# Patient Record
Sex: FEMALE | Race: WHITE | NOT HISPANIC OR LATINO | Employment: UNEMPLOYED | URBAN - METROPOLITAN AREA
[De-identification: names, ages, dates, MRNs, and addresses within clinical notes are randomized per-mention and may not be internally consistent; named-entity substitution may affect disease eponyms.]

---

## 2018-01-01 ENCOUNTER — APPOINTMENT (OUTPATIENT)
Dept: RADIOLOGY | Facility: MEDICAL CENTER | Age: 48
DRG: 870 | End: 2018-01-01
Attending: SURGERY
Payer: MEDICAID

## 2018-01-01 ENCOUNTER — APPOINTMENT (OUTPATIENT)
Dept: RADIOLOGY | Facility: MEDICAL CENTER | Age: 48
DRG: 870 | End: 2018-01-01
Attending: EMERGENCY MEDICINE
Payer: MEDICAID

## 2018-01-01 ENCOUNTER — APPOINTMENT (OUTPATIENT)
Dept: RADIOLOGY | Facility: MEDICAL CENTER | Age: 48
DRG: 870 | End: 2018-01-01
Attending: INTERNAL MEDICINE
Payer: MEDICAID

## 2018-01-01 ENCOUNTER — APPOINTMENT (OUTPATIENT)
Dept: RADIOLOGY | Facility: MEDICAL CENTER | Age: 48
DRG: 870 | End: 2018-01-01
Attending: HOSPITALIST
Payer: MEDICAID

## 2018-01-01 ENCOUNTER — RESOLUTE PROFESSIONAL BILLING HOSPITAL PROF FEE (OUTPATIENT)
Dept: HOSPITALIST | Facility: MEDICAL CENTER | Age: 48
End: 2018-01-01
Payer: MEDICAID

## 2018-01-01 ENCOUNTER — HOSPITAL ENCOUNTER (INPATIENT)
Facility: MEDICAL CENTER | Age: 48
LOS: 6 days | DRG: 870 | End: 2018-02-25
Attending: EMERGENCY MEDICINE | Admitting: HOSPITALIST
Payer: MEDICAID

## 2018-01-01 VITALS
TEMPERATURE: 99 F | DIASTOLIC BLOOD PRESSURE: 90 MMHG | WEIGHT: 293 LBS | HEART RATE: 77 BPM | OXYGEN SATURATION: 96 % | SYSTOLIC BLOOD PRESSURE: 128 MMHG | HEIGHT: 65 IN | BODY MASS INDEX: 48.82 KG/M2

## 2018-01-01 DIAGNOSIS — H10.33 ACUTE BACTERIAL CONJUNCTIVITIS OF BOTH EYES: ICD-10-CM

## 2018-01-01 DIAGNOSIS — I50.9 ACUTE CONGESTIVE HEART FAILURE, UNSPECIFIED CONGESTIVE HEART FAILURE TYPE: ICD-10-CM

## 2018-01-01 DIAGNOSIS — J96.00 ACUTE RESPIRATORY FAILURE, UNSPECIFIED WHETHER WITH HYPOXIA OR HYPERCAPNIA (HCC): Primary | ICD-10-CM

## 2018-01-01 DIAGNOSIS — I48.91 ATRIAL FIBRILLATION WITH RAPID VENTRICULAR RESPONSE (HCC): ICD-10-CM

## 2018-01-01 LAB
ACTION RANGE TRIGGERED IACRT: NO
ACTION RANGE TRIGGERED IACRT: YES
ALBUMIN SERPL BCP-MCNC: 2.7 G/DL (ref 3.2–4.9)
ALBUMIN SERPL BCP-MCNC: 2.8 G/DL (ref 3.2–4.9)
ALBUMIN SERPL BCP-MCNC: 3.8 G/DL (ref 3.2–4.9)
ALBUMIN/GLOB SERPL: 0.9 G/DL
ALBUMIN/GLOB SERPL: 1.2 G/DL
ALP SERPL-CCNC: 109 U/L (ref 30–99)
ALP SERPL-CCNC: 194 U/L (ref 30–99)
ALP SERPL-CCNC: 222 U/L (ref 30–99)
ALT SERPL-CCNC: 1671 U/L (ref 2–50)
ALT SERPL-CCNC: 2837 U/L (ref 2–50)
ALT SERPL-CCNC: 404 U/L (ref 2–50)
AMMONIA PLAS-SCNC: 244 UMOL/L (ref 11–45)
AMMONIA PLAS-SCNC: 377 UMOL/L (ref 11–45)
ANION GAP SERPL CALC-SCNC: 10 MMOL/L (ref 0–11.9)
ANION GAP SERPL CALC-SCNC: 13 MMOL/L (ref 0–11.9)
ANION GAP SERPL CALC-SCNC: 14 MMOL/L (ref 0–11.9)
ANION GAP SERPL CALC-SCNC: 15 MMOL/L (ref 0–11.9)
ANION GAP SERPL CALC-SCNC: 15 MMOL/L (ref 0–11.9)
ANION GAP SERPL CALC-SCNC: 21 MMOL/L (ref 0–11.9)
ANION GAP SERPL CALC-SCNC: 4 MMOL/L (ref 0–11.9)
ANION GAP SERPL CALC-SCNC: 7 MMOL/L (ref 0–11.9)
ANISOCYTOSIS BLD QL SMEAR: ABNORMAL
APTT PPP: 102.4 SEC (ref 24.7–36)
APTT PPP: 109.1 SEC (ref 24.7–36)
APTT PPP: 111.4 SEC (ref 24.7–36)
APTT PPP: 122.4 SEC (ref 24.7–36)
APTT PPP: 142.6 SEC (ref 24.7–36)
APTT PPP: 25.2 SEC (ref 24.7–36)
APTT PPP: 34.8 SEC (ref 24.7–36)
APTT PPP: 50.7 SEC (ref 24.7–36)
APTT PPP: 52.7 SEC (ref 24.7–36)
APTT PPP: 73.7 SEC (ref 24.7–36)
APTT PPP: 90 SEC (ref 24.7–36)
APTT PPP: 90.9 SEC (ref 24.7–36)
APTT PPP: 91 SEC (ref 24.7–36)
APTT PPP: 94.3 SEC (ref 24.7–36)
AST SERPL-CCNC: 1013 U/L (ref 12–45)
AST SERPL-CCNC: 2520 U/L (ref 12–45)
AST SERPL-CCNC: 290 U/L (ref 12–45)
BACTERIA BLD CULT: ABNORMAL
BACTERIA BLD CULT: ABNORMAL
BACTERIA SPEC RESP CULT: NORMAL
BASE EXCESS BLDA CALC-SCNC: -1 MMOL/L (ref -4–3)
BASE EXCESS BLDA CALC-SCNC: -10 MMOL/L (ref -4–3)
BASE EXCESS BLDA CALC-SCNC: -12 MMOL/L (ref -4–3)
BASE EXCESS BLDA CALC-SCNC: -13 MMOL/L (ref -4–3)
BASE EXCESS BLDA CALC-SCNC: -15 MMOL/L (ref -4–3)
BASE EXCESS BLDA CALC-SCNC: -15 MMOL/L (ref -4–3)
BASE EXCESS BLDA CALC-SCNC: -2 MMOL/L (ref -4–3)
BASE EXCESS BLDA CALC-SCNC: -3 MMOL/L (ref -4–3)
BASE EXCESS BLDA CALC-SCNC: -4 MMOL/L (ref -4–3)
BASE EXCESS BLDA CALC-SCNC: -4 MMOL/L (ref -4–3)
BASE EXCESS BLDA CALC-SCNC: 1 MMOL/L (ref -4–3)
BASOPHILS # BLD AUTO: 0 % (ref 0–1.8)
BASOPHILS # BLD AUTO: 0 % (ref 0–1.8)
BASOPHILS # BLD AUTO: 0.1 % (ref 0–1.8)
BASOPHILS # BLD AUTO: 0.1 % (ref 0–1.8)
BASOPHILS # BLD AUTO: 0.2 % (ref 0–1.8)
BASOPHILS # BLD AUTO: 0.2 % (ref 0–1.8)
BASOPHILS # BLD AUTO: 0.4 % (ref 0–1.8)
BASOPHILS # BLD: 0 K/UL (ref 0–0.12)
BASOPHILS # BLD: 0 K/UL (ref 0–0.12)
BASOPHILS # BLD: 0.01 K/UL (ref 0–0.12)
BASOPHILS # BLD: 0.03 K/UL (ref 0–0.12)
BASOPHILS # BLD: 0.04 K/UL (ref 0–0.12)
BASOPHILS # BLD: 0.04 K/UL (ref 0–0.12)
BASOPHILS # BLD: 0.08 K/UL (ref 0–0.12)
BILIRUB CONJ SERPL-MCNC: 1.8 MG/DL (ref 0.1–0.5)
BILIRUB INDIRECT SERPL-MCNC: 1.4 MG/DL (ref 0–1)
BILIRUB SERPL-MCNC: 1.8 MG/DL (ref 0.1–1.5)
BILIRUB SERPL-MCNC: 2.5 MG/DL (ref 0.1–1.5)
BILIRUB SERPL-MCNC: 3.2 MG/DL (ref 0.1–1.5)
BNP SERPL-MCNC: 176 PG/ML (ref 0–100)
BNP SERPL-MCNC: 370 PG/ML (ref 0–100)
BODY TEMPERATURE: ABNORMAL DEGREES
BODY TEMPERATURE: NORMAL DEGREES
BODY TEMPERATURE: NORMAL DEGREES
BUN SERPL-MCNC: 20 MG/DL (ref 8–22)
BUN SERPL-MCNC: 22 MG/DL (ref 8–22)
BUN SERPL-MCNC: 27 MG/DL (ref 8–22)
BUN SERPL-MCNC: 36 MG/DL (ref 8–22)
BUN SERPL-MCNC: 40 MG/DL (ref 8–22)
BUN SERPL-MCNC: 52 MG/DL (ref 8–22)
BUN SERPL-MCNC: 57 MG/DL (ref 8–22)
BUN SERPL-MCNC: 63 MG/DL (ref 8–22)
CALCIUM SERPL-MCNC: 6.2 MG/DL (ref 8.5–10.5)
CALCIUM SERPL-MCNC: 6.6 MG/DL (ref 8.5–10.5)
CALCIUM SERPL-MCNC: 6.6 MG/DL (ref 8.5–10.5)
CALCIUM SERPL-MCNC: 6.7 MG/DL (ref 8.5–10.5)
CALCIUM SERPL-MCNC: 7.7 MG/DL (ref 8.5–10.5)
CALCIUM SERPL-MCNC: 8.7 MG/DL (ref 8.5–10.5)
CALCIUM SERPL-MCNC: 8.7 MG/DL (ref 8.5–10.5)
CALCIUM SERPL-MCNC: 9 MG/DL (ref 8.5–10.5)
CHLORIDE SERPL-SCNC: 100 MMOL/L (ref 96–112)
CHLORIDE SERPL-SCNC: 100 MMOL/L (ref 96–112)
CHLORIDE SERPL-SCNC: 102 MMOL/L (ref 96–112)
CHLORIDE SERPL-SCNC: 106 MMOL/L (ref 96–112)
CHLORIDE SERPL-SCNC: 95 MMOL/L (ref 96–112)
CHLORIDE SERPL-SCNC: 96 MMOL/L (ref 96–112)
CO2 BLDA-SCNC: 13 MMOL/L (ref 20–33)
CO2 BLDA-SCNC: 15 MMOL/L (ref 20–33)
CO2 BLDA-SCNC: 16 MMOL/L (ref 20–33)
CO2 BLDA-SCNC: 17 MMOL/L (ref 20–33)
CO2 BLDA-SCNC: 21 MMOL/L (ref 20–33)
CO2 BLDA-SCNC: 22 MMOL/L (ref 20–33)
CO2 BLDA-SCNC: 23 MMOL/L (ref 20–33)
CO2 BLDA-SCNC: 23 MMOL/L (ref 20–33)
CO2 BLDA-SCNC: 24 MMOL/L (ref 20–33)
CO2 BLDA-SCNC: 27 MMOL/L (ref 20–33)
CO2 BLDA-SCNC: 30 MMOL/L (ref 20–33)
CO2 SERPL-SCNC: 14 MMOL/L (ref 20–33)
CO2 SERPL-SCNC: 17 MMOL/L (ref 20–33)
CO2 SERPL-SCNC: 21 MMOL/L (ref 20–33)
CO2 SERPL-SCNC: 22 MMOL/L (ref 20–33)
CO2 SERPL-SCNC: 27 MMOL/L (ref 20–33)
CO2 SERPL-SCNC: 29 MMOL/L (ref 20–33)
COMMENT 1642: NORMAL
COMMENT 1642: NORMAL
CORTIS SERPL-MCNC: 25.2 UG/DL (ref 0–23)
CREAT SERPL-MCNC: 1.05 MG/DL (ref 0.5–1.4)
CREAT SERPL-MCNC: 1.3 MG/DL (ref 0.5–1.4)
CREAT SERPL-MCNC: 1.86 MG/DL (ref 0.5–1.4)
CREAT SERPL-MCNC: 2.64 MG/DL (ref 0.5–1.4)
CREAT SERPL-MCNC: 3.35 MG/DL (ref 0.5–1.4)
CREAT SERPL-MCNC: 4.43 MG/DL (ref 0.5–1.4)
CREAT SERPL-MCNC: 5.39 MG/DL (ref 0.5–1.4)
CREAT SERPL-MCNC: 5.46 MG/DL (ref 0.5–1.4)
EKG IMPRESSION: NORMAL
EKG IMPRESSION: NORMAL
EOSINOPHIL # BLD AUTO: 0 K/UL (ref 0–0.51)
EOSINOPHIL # BLD AUTO: 0.01 K/UL (ref 0–0.51)
EOSINOPHIL # BLD AUTO: 0.03 K/UL (ref 0–0.51)
EOSINOPHIL NFR BLD: 0 % (ref 0–6.9)
EOSINOPHIL NFR BLD: 0.1 % (ref 0–6.9)
EOSINOPHIL NFR BLD: 0.1 % (ref 0–6.9)
ERYTHROCYTE [DISTWIDTH] IN BLOOD BY AUTOMATED COUNT: 50.4 FL (ref 35.9–50)
ERYTHROCYTE [DISTWIDTH] IN BLOOD BY AUTOMATED COUNT: 50.4 FL (ref 35.9–50)
ERYTHROCYTE [DISTWIDTH] IN BLOOD BY AUTOMATED COUNT: 52 FL (ref 35.9–50)
ERYTHROCYTE [DISTWIDTH] IN BLOOD BY AUTOMATED COUNT: 52.2 FL (ref 35.9–50)
ERYTHROCYTE [DISTWIDTH] IN BLOOD BY AUTOMATED COUNT: 52.8 FL (ref 35.9–50)
ERYTHROCYTE [DISTWIDTH] IN BLOOD BY AUTOMATED COUNT: 54.4 FL (ref 35.9–50)
ERYTHROCYTE [DISTWIDTH] IN BLOOD BY AUTOMATED COUNT: 54.5 FL (ref 35.9–50)
GLOBULIN SER CALC-MCNC: 2.9 G/DL (ref 1.9–3.5)
GLOBULIN SER CALC-MCNC: 3.3 G/DL (ref 1.9–3.5)
GLUCOSE BLD-MCNC: 102 MG/DL (ref 65–99)
GLUCOSE BLD-MCNC: 108 MG/DL (ref 65–99)
GLUCOSE BLD-MCNC: 109 MG/DL (ref 65–99)
GLUCOSE BLD-MCNC: 113 MG/DL (ref 65–99)
GLUCOSE BLD-MCNC: 113 MG/DL (ref 65–99)
GLUCOSE BLD-MCNC: 114 MG/DL (ref 65–99)
GLUCOSE BLD-MCNC: 117 MG/DL (ref 65–99)
GLUCOSE BLD-MCNC: 120 MG/DL (ref 65–99)
GLUCOSE BLD-MCNC: 121 MG/DL (ref 65–99)
GLUCOSE BLD-MCNC: 125 MG/DL (ref 65–99)
GLUCOSE BLD-MCNC: 129 MG/DL (ref 65–99)
GLUCOSE BLD-MCNC: 132 MG/DL (ref 65–99)
GLUCOSE BLD-MCNC: 135 MG/DL (ref 65–99)
GLUCOSE BLD-MCNC: 145 MG/DL (ref 65–99)
GLUCOSE BLD-MCNC: 151 MG/DL (ref 65–99)
GLUCOSE BLD-MCNC: 157 MG/DL (ref 65–99)
GLUCOSE BLD-MCNC: 61 MG/DL (ref 65–99)
GLUCOSE BLD-MCNC: 63 MG/DL (ref 65–99)
GLUCOSE BLD-MCNC: 71 MG/DL (ref 65–99)
GLUCOSE BLD-MCNC: 76 MG/DL (ref 65–99)
GLUCOSE BLD-MCNC: 79 MG/DL (ref 65–99)
GLUCOSE BLD-MCNC: 82 MG/DL (ref 65–99)
GLUCOSE BLD-MCNC: 86 MG/DL (ref 65–99)
GLUCOSE BLD-MCNC: 89 MG/DL (ref 65–99)
GLUCOSE BLD-MCNC: 91 MG/DL (ref 65–99)
GLUCOSE BLD-MCNC: 92 MG/DL (ref 65–99)
GLUCOSE BLD-MCNC: 93 MG/DL (ref 65–99)
GLUCOSE BLD-MCNC: 95 MG/DL (ref 65–99)
GLUCOSE BLD-MCNC: 98 MG/DL (ref 65–99)
GLUCOSE SERPL-MCNC: 106 MG/DL (ref 65–99)
GLUCOSE SERPL-MCNC: 113 MG/DL (ref 65–99)
GLUCOSE SERPL-MCNC: 115 MG/DL (ref 65–99)
GLUCOSE SERPL-MCNC: 124 MG/DL (ref 65–99)
GLUCOSE SERPL-MCNC: 126 MG/DL (ref 65–99)
GLUCOSE SERPL-MCNC: 150 MG/DL (ref 65–99)
GLUCOSE SERPL-MCNC: 178 MG/DL (ref 65–99)
GLUCOSE SERPL-MCNC: 99 MG/DL (ref 65–99)
GRAM STN SPEC: NORMAL
GRAM STN SPEC: NORMAL
HAV IGM SERPL QL IA: NEGATIVE
HBV CORE IGM SER QL: NEGATIVE
HBV SURFACE AB SERPL IA-ACNC: <3.1 MIU/ML (ref 0–10)
HBV SURFACE AG SER QL: NEGATIVE
HCO3 BLDA-SCNC: 11.9 MMOL/L (ref 17–25)
HCO3 BLDA-SCNC: 13.9 MMOL/L (ref 17–25)
HCO3 BLDA-SCNC: 14.4 MMOL/L (ref 17–25)
HCO3 BLDA-SCNC: 16.1 MMOL/L (ref 17–25)
HCO3 BLDA-SCNC: 19.8 MMOL/L (ref 17–25)
HCO3 BLDA-SCNC: 19.9 MMOL/L (ref 17–25)
HCO3 BLDA-SCNC: 21.7 MMOL/L (ref 17–25)
HCO3 BLDA-SCNC: 21.7 MMOL/L (ref 17–25)
HCO3 BLDA-SCNC: 22.4 MMOL/L (ref 17–25)
HCO3 BLDA-SCNC: 22.6 MMOL/L (ref 17–25)
HCO3 BLDA-SCNC: 22.7 MMOL/L (ref 17–25)
HCO3 BLDA-SCNC: 24.8 MMOL/L (ref 17–25)
HCO3 BLDA-SCNC: 28.2 MMOL/L (ref 17–25)
HCT VFR BLD AUTO: 38.8 % (ref 37–47)
HCT VFR BLD AUTO: 40 % (ref 37–47)
HCT VFR BLD AUTO: 40.5 % (ref 37–47)
HCT VFR BLD AUTO: 40.9 % (ref 37–47)
HCT VFR BLD AUTO: 42.4 % (ref 37–47)
HCT VFR BLD AUTO: 45.1 % (ref 37–47)
HCT VFR BLD AUTO: 48.2 % (ref 37–47)
HCV AB SER QL: NEGATIVE
HEPIND PLTAB  51052: NEGATIVE
HGB BLD-MCNC: 11.5 G/DL (ref 12–16)
HGB BLD-MCNC: 12.1 G/DL (ref 12–16)
HGB BLD-MCNC: 12.1 G/DL (ref 12–16)
HGB BLD-MCNC: 12.3 G/DL (ref 12–16)
HGB BLD-MCNC: 12.6 G/DL (ref 12–16)
HGB BLD-MCNC: 13.1 G/DL (ref 12–16)
HGB BLD-MCNC: 13.5 G/DL (ref 12–16)
HYPOCHROMIA BLD QL SMEAR: ABNORMAL
IMM GRANULOCYTES # BLD AUTO: 0.03 K/UL (ref 0–0.11)
IMM GRANULOCYTES # BLD AUTO: 0.07 K/UL (ref 0–0.11)
IMM GRANULOCYTES # BLD AUTO: 0.21 K/UL (ref 0–0.11)
IMM GRANULOCYTES # BLD AUTO: 0.24 K/UL (ref 0–0.11)
IMM GRANULOCYTES # BLD AUTO: 0.28 K/UL (ref 0–0.11)
IMM GRANULOCYTES NFR BLD AUTO: 0.4 % (ref 0–0.9)
IMM GRANULOCYTES NFR BLD AUTO: 0.5 % (ref 0–0.9)
IMM GRANULOCYTES NFR BLD AUTO: 0.9 % (ref 0–0.9)
IMM GRANULOCYTES NFR BLD AUTO: 1.1 % (ref 0–0.9)
IMM GRANULOCYTES NFR BLD AUTO: 1.3 % (ref 0–0.9)
INR PPP: 1.44 (ref 0.87–1.13)
INR PPP: 1.64 (ref 0.87–1.13)
INR PPP: 2.56 (ref 0.87–1.13)
INR PPP: 3.07 (ref 0.87–1.13)
INR PPP: 3.18 (ref 0.87–1.13)
INR PPP: 3.18 (ref 0.87–1.13)
INR PPP: 3.48 (ref 0.87–1.13)
INST. QUALIFIED PATIENT IIQPT: YES
LACTATE BLD-SCNC: 2.4 MMOL/L (ref 0.5–2)
LACTATE BLD-SCNC: 2.7 MMOL/L (ref 0.5–2)
LACTATE BLD-SCNC: 4.1 MMOL/L (ref 0.5–2)
LACTATE BLD-SCNC: 5.2 MMOL/L (ref 0.5–2)
LACTATE BLD-SCNC: 5.2 MMOL/L (ref 0.5–2)
LACTATE BLD-SCNC: 5.3 MMOL/L (ref 0.5–2)
LACTATE BLD-SCNC: 5.4 MMOL/L (ref 0.5–2)
LACTATE BLD-SCNC: 5.6 MMOL/L (ref 0.5–2)
LACTATE BLD-SCNC: 5.6 MMOL/L (ref 0.5–2)
LACTATE BLD-SCNC: 5.9 MMOL/L (ref 0.5–2)
LACTATE BLD-SCNC: 6.1 MMOL/L (ref 0.5–2)
LACTATE BLD-SCNC: 6.2 MMOL/L (ref 0.5–2)
LACTATE BLD-SCNC: 6.6 MMOL/L (ref 0.5–2)
LACTATE BLD-SCNC: 7.3 MMOL/L (ref 0.5–2)
LACTATE BLD-SCNC: 8.3 MMOL/L (ref 0.5–2)
LACTATE BLD-SCNC: 9.1 MMOL/L (ref 0.5–2)
LACTATE BLD-SCNC: 9.5 MMOL/L (ref 0.5–2)
LACTATE BLD-SCNC: 9.9 MMOL/L (ref 0.5–2)
LV EJECT FRACT  99904: 20
LYMPHOCYTES # BLD AUTO: 0.45 K/UL (ref 1–4.8)
LYMPHOCYTES # BLD AUTO: 0.68 K/UL (ref 1–4.8)
LYMPHOCYTES # BLD AUTO: 1.36 K/UL (ref 1–4.8)
LYMPHOCYTES # BLD AUTO: 1.52 K/UL (ref 1–4.8)
LYMPHOCYTES # BLD AUTO: 1.63 K/UL (ref 1–4.8)
LYMPHOCYTES # BLD AUTO: 2.07 K/UL (ref 1–4.8)
LYMPHOCYTES # BLD AUTO: 2.7 K/UL (ref 1–4.8)
LYMPHOCYTES NFR BLD: 10.7 % (ref 22–41)
LYMPHOCYTES NFR BLD: 12.7 % (ref 22–41)
LYMPHOCYTES NFR BLD: 4.5 % (ref 22–41)
LYMPHOCYTES NFR BLD: 5.1 % (ref 22–41)
LYMPHOCYTES NFR BLD: 5.7 % (ref 22–41)
LYMPHOCYTES NFR BLD: 7.7 % (ref 22–41)
LYMPHOCYTES NFR BLD: 9.7 % (ref 22–41)
MACROCYTES BLD QL SMEAR: ABNORMAL
MAGNESIUM SERPL-MCNC: 1.9 MG/DL (ref 1.5–2.5)
MAGNESIUM SERPL-MCNC: 1.9 MG/DL (ref 1.5–2.5)
MAGNESIUM SERPL-MCNC: 2.1 MG/DL (ref 1.5–2.5)
MANUAL DIFF BLD: NORMAL
MANUAL DIFF BLD: NORMAL
MCH RBC QN AUTO: 27.3 PG (ref 27–33)
MCH RBC QN AUTO: 27.5 PG (ref 27–33)
MCH RBC QN AUTO: 27.5 PG (ref 27–33)
MCH RBC QN AUTO: 27.6 PG (ref 27–33)
MCH RBC QN AUTO: 27.8 PG (ref 27–33)
MCH RBC QN AUTO: 28 PG (ref 27–33)
MCH RBC QN AUTO: 28.2 PG (ref 27–33)
MCHC RBC AUTO-ENTMCNC: 28 G/DL (ref 33.6–35)
MCHC RBC AUTO-ENTMCNC: 29 G/DL (ref 33.6–35)
MCHC RBC AUTO-ENTMCNC: 29.6 G/DL (ref 33.6–35)
MCHC RBC AUTO-ENTMCNC: 29.7 G/DL (ref 33.6–35)
MCHC RBC AUTO-ENTMCNC: 29.9 G/DL (ref 33.6–35)
MCHC RBC AUTO-ENTMCNC: 30.1 G/DL (ref 33.6–35)
MCHC RBC AUTO-ENTMCNC: 30.3 G/DL (ref 33.6–35)
MCV RBC AUTO: 91.7 FL (ref 81.4–97.8)
MCV RBC AUTO: 91.9 FL (ref 81.4–97.8)
MCV RBC AUTO: 92 FL (ref 81.4–97.8)
MCV RBC AUTO: 93 FL (ref 81.4–97.8)
MCV RBC AUTO: 93 FL (ref 81.4–97.8)
MCV RBC AUTO: 97.2 FL (ref 81.4–97.8)
MCV RBC AUTO: 98.2 FL (ref 81.4–97.8)
MICROCYTES BLD QL SMEAR: ABNORMAL
MONOCYTES # BLD AUTO: 0.32 K/UL (ref 0–0.85)
MONOCYTES # BLD AUTO: 0.42 K/UL (ref 0–0.85)
MONOCYTES # BLD AUTO: 1.23 K/UL (ref 0–0.85)
MONOCYTES # BLD AUTO: 1.7 K/UL (ref 0–0.85)
MONOCYTES # BLD AUTO: 2 K/UL (ref 0–0.85)
MONOCYTES # BLD AUTO: 2.47 K/UL (ref 0–0.85)
MONOCYTES # BLD AUTO: 3.18 K/UL (ref 0–0.85)
MONOCYTES NFR BLD AUTO: 12.3 % (ref 0–13.4)
MONOCYTES NFR BLD AUTO: 15.1 % (ref 0–13.4)
MONOCYTES NFR BLD AUTO: 2.7 % (ref 0–13.4)
MONOCYTES NFR BLD AUTO: 4.1 % (ref 0–13.4)
MONOCYTES NFR BLD AUTO: 5.6 % (ref 0–13.4)
MONOCYTES NFR BLD AUTO: 9.3 % (ref 0–13.4)
MONOCYTES NFR BLD AUTO: 9.8 % (ref 0–13.4)
MORPHOLOGY BLD-IMP: NORMAL
NEUTROPHILS # BLD AUTO: 11.21 K/UL (ref 2–7.15)
NEUTROPHILS # BLD AUTO: 11.92 K/UL (ref 2–7.15)
NEUTROPHILS # BLD AUTO: 13.75 K/UL (ref 2–7.15)
NEUTROPHILS # BLD AUTO: 15.94 K/UL (ref 2–7.15)
NEUTROPHILS # BLD AUTO: 20.03 K/UL (ref 2–7.15)
NEUTROPHILS # BLD AUTO: 27.17 K/UL (ref 2–7.15)
NEUTROPHILS # BLD AUTO: 7.01 K/UL (ref 2–7.15)
NEUTROPHILS NFR BLD: 73.5 % (ref 44–72)
NEUTROPHILS NFR BLD: 75.6 % (ref 44–72)
NEUTROPHILS NFR BLD: 76.8 % (ref 44–72)
NEUTROPHILS NFR BLD: 84.9 % (ref 44–72)
NEUTROPHILS NFR BLD: 87.6 % (ref 44–72)
NEUTROPHILS NFR BLD: 88.9 % (ref 44–72)
NEUTROPHILS NFR BLD: 89.6 % (ref 44–72)
NEUTS BAND NFR BLD MANUAL: 2.7 % (ref 0–10)
NRBC # BLD AUTO: 0.06 K/UL
NRBC # BLD AUTO: 0.34 K/UL
NRBC # BLD AUTO: 0.87 K/UL
NRBC # BLD AUTO: 1.43 K/UL
NRBC # BLD AUTO: 1.94 K/UL
NRBC # BLD AUTO: 2.39 K/UL
NRBC # BLD AUTO: 3.1 K/UL
NRBC BLD-RTO: 0.8 /100 WBC
NRBC BLD-RTO: 14.7 /100 WBC
NRBC BLD-RTO: 19.7 /100 WBC
NRBC BLD-RTO: 2.6 /100 WBC
NRBC BLD-RTO: 4.1 /100 WBC
NRBC BLD-RTO: 4.7 /100 WBC
NRBC BLD-RTO: 7.7 /100 WBC
O2/TOTAL GAS SETTING VFR VENT: 100 %
O2/TOTAL GAS SETTING VFR VENT: 60 %
O2/TOTAL GAS SETTING VFR VENT: 60 %
O2/TOTAL GAS SETTING VFR VENT: 80 %
O2/TOTAL GAS SETTING VFR VENT: 80 %
O2/TOTAL GAS SETTING VFR VENT: 90 %
PCO2 BLDA: 31.1 MMHG (ref 26–37)
PCO2 BLDA: 31.7 MMHG (ref 26–37)
PCO2 BLDA: 32 MMHG (ref 26–37)
PCO2 BLDA: 34.5 MMHG (ref 26–37)
PCO2 BLDA: 34.8 MMHG (ref 26–37)
PCO2 BLDA: 36.1 MMHG (ref 26–37)
PCO2 BLDA: 41.5 MMHG (ref 26–37)
PCO2 BLDA: 41.7 MMHG (ref 26–37)
PCO2 BLDA: 43.9 MMHG (ref 26–37)
PCO2 BLDA: 47.6 MMHG (ref 26–37)
PCO2 BLDA: 52.1 MMHG (ref 26–37)
PCO2 BLDA: 56.5 MMHG (ref 26–37)
PCO2 BLDA: 57.7 MMHG (ref 26–37)
PCO2 TEMP ADJ BLDA: 30.3 MMHG (ref 26–37)
PCO2 TEMP ADJ BLDA: 31.1 MMHG (ref 26–37)
PCO2 TEMP ADJ BLDA: 31.4 MMHG (ref 26–37)
PCO2 TEMP ADJ BLDA: 32.7 MMHG (ref 26–37)
PCO2 TEMP ADJ BLDA: 34.4 MMHG (ref 26–37)
PCO2 TEMP ADJ BLDA: 35.4 MMHG (ref 26–37)
PCO2 TEMP ADJ BLDA: 39.3 MMHG (ref 26–37)
PCO2 TEMP ADJ BLDA: 41.2 MMHG (ref 26–37)
PCO2 TEMP ADJ BLDA: 43.4 MMHG (ref 26–37)
PCO2 TEMP ADJ BLDA: 45.3 MMHG (ref 26–37)
PCO2 TEMP ADJ BLDA: 52.1 MMHG (ref 26–37)
PCO2 TEMP ADJ BLDA: 53.3 MMHG (ref 26–37)
PCO2 TEMP ADJ BLDA: 54.2 MMHG (ref 26–37)
PH BLDA: 7.09 [PH] (ref 7.4–7.5)
PH BLDA: 7.14 [PH] (ref 7.4–7.5)
PH BLDA: 7.18 [PH] (ref 7.4–7.5)
PH BLDA: 7.19 [PH] (ref 7.4–7.5)
PH BLDA: 7.21 [PH] (ref 7.4–7.5)
PH BLDA: 7.25 [PH] (ref 7.4–7.5)
PH BLDA: 7.32 [PH] (ref 7.4–7.5)
PH BLDA: 7.34 [PH] (ref 7.4–7.5)
PH BLDA: 7.34 [PH] (ref 7.4–7.5)
PH BLDA: 7.39 [PH] (ref 7.4–7.5)
PH BLDA: 7.41 [PH] (ref 7.4–7.5)
PH BLDA: 7.42 [PH] (ref 7.4–7.5)
PH BLDA: 7.44 [PH] (ref 7.4–7.5)
PH TEMP ADJ BLDA: 7.1 [PH] (ref 7.4–7.5)
PH TEMP ADJ BLDA: 7.16 [PH] (ref 7.4–7.5)
PH TEMP ADJ BLDA: 7.2 [PH] (ref 7.4–7.5)
PH TEMP ADJ BLDA: 7.21 [PH] (ref 7.4–7.5)
PH TEMP ADJ BLDA: 7.23 [PH] (ref 7.4–7.5)
PH TEMP ADJ BLDA: 7.27 [PH] (ref 7.4–7.5)
PH TEMP ADJ BLDA: 7.32 [PH] (ref 7.4–7.5)
PH TEMP ADJ BLDA: 7.34 [PH] (ref 7.4–7.5)
PH TEMP ADJ BLDA: 7.34 [PH] (ref 7.4–7.5)
PH TEMP ADJ BLDA: 7.39 [PH] (ref 7.4–7.5)
PH TEMP ADJ BLDA: 7.41 [PH] (ref 7.4–7.5)
PH TEMP ADJ BLDA: 7.43 [PH] (ref 7.4–7.5)
PH TEMP ADJ BLDA: 7.45 [PH] (ref 7.4–7.5)
PHOSPHATE SERPL-MCNC: 6.3 MG/DL (ref 2.5–4.5)
PHOSPHATE SERPL-MCNC: 7.8 MG/DL (ref 2.5–4.5)
PHOSPHATE SERPL-MCNC: 8.2 MG/DL (ref 2.5–4.5)
PLATELET # BLD AUTO: 114 K/UL (ref 164–446)
PLATELET # BLD AUTO: 126 K/UL (ref 164–446)
PLATELET # BLD AUTO: 223 K/UL (ref 164–446)
PLATELET # BLD AUTO: 264 K/UL (ref 164–446)
PLATELET # BLD AUTO: 286 K/UL (ref 164–446)
PLATELET # BLD AUTO: 75 K/UL (ref 164–446)
PLATELET # BLD AUTO: 93 K/UL (ref 164–446)
PLATELET BLD QL SMEAR: NORMAL
PMV BLD AUTO: 10.5 FL (ref 9–12.9)
PMV BLD AUTO: 11.1 FL (ref 9–12.9)
PMV BLD AUTO: 11.7 FL (ref 9–12.9)
PMV BLD AUTO: 12.1 FL (ref 9–12.9)
PMV BLD AUTO: 12.4 FL (ref 9–12.9)
PMV BLD AUTO: 13.3 FL (ref 9–12.9)
PMV BLD AUTO: 9.8 FL (ref 9–12.9)
PO2 BLDA: 151 MMHG (ref 64–87)
PO2 BLDA: 153 MMHG (ref 64–87)
PO2 BLDA: 197 MMHG (ref 64–87)
PO2 BLDA: 58 MMHG (ref 64–87)
PO2 BLDA: 65 MMHG (ref 64–87)
PO2 BLDA: 69 MMHG (ref 64–87)
PO2 BLDA: 71 MMHG (ref 64–87)
PO2 BLDA: 72 MMHG (ref 64–87)
PO2 BLDA: 72 MMHG (ref 64–87)
PO2 BLDA: 79 MMHG (ref 64–87)
PO2 BLDA: 80 MMHG (ref 64–87)
PO2 BLDA: 85 MMHG (ref 64–87)
PO2 BLDA: 93 MMHG (ref 64–87)
PO2 TEMP ADJ BLDA: 143 MMHG (ref 64–87)
PO2 TEMP ADJ BLDA: 153 MMHG (ref 64–87)
PO2 TEMP ADJ BLDA: 195 MMHG (ref 64–87)
PO2 TEMP ADJ BLDA: 55 MMHG (ref 64–87)
PO2 TEMP ADJ BLDA: 63 MMHG (ref 64–87)
PO2 TEMP ADJ BLDA: 64 MMHG (ref 64–87)
PO2 TEMP ADJ BLDA: 65 MMHG (ref 64–87)
PO2 TEMP ADJ BLDA: 71 MMHG (ref 64–87)
PO2 TEMP ADJ BLDA: 72 MMHG (ref 64–87)
PO2 TEMP ADJ BLDA: 72 MMHG (ref 64–87)
PO2 TEMP ADJ BLDA: 73 MMHG (ref 64–87)
PO2 TEMP ADJ BLDA: 83 MMHG (ref 64–87)
PO2 TEMP ADJ BLDA: 92 MMHG (ref 64–87)
POLYCHROMASIA BLD QL SMEAR: NORMAL
POTASSIUM SERPL-SCNC: 4 MMOL/L (ref 3.6–5.5)
POTASSIUM SERPL-SCNC: 4.5 MMOL/L (ref 3.6–5.5)
POTASSIUM SERPL-SCNC: 5 MMOL/L (ref 3.6–5.5)
POTASSIUM SERPL-SCNC: 5.1 MMOL/L (ref 3.6–5.5)
POTASSIUM SERPL-SCNC: 5.3 MMOL/L (ref 3.6–5.5)
POTASSIUM SERPL-SCNC: 5.4 MMOL/L (ref 3.6–5.5)
POTASSIUM SERPL-SCNC: 5.7 MMOL/L (ref 3.6–5.5)
POTASSIUM SERPL-SCNC: 6 MMOL/L (ref 3.6–5.5)
PROCALCITONIN SERPL-MCNC: 0.23 NG/ML
PROCALCITONIN SERPL-MCNC: 4.74 NG/ML
PROCALCITONIN SERPL-MCNC: 6.03 NG/ML
PROT SERPL-MCNC: 5.6 G/DL (ref 6–8.2)
PROT SERPL-MCNC: 5.6 G/DL (ref 6–8.2)
PROT SERPL-MCNC: 7.1 G/DL (ref 6–8.2)
PROTHROMBIN TIME: 17.2 SEC (ref 12–14.6)
PROTHROMBIN TIME: 19.1 SEC (ref 12–14.6)
PROTHROMBIN TIME: 27.2 SEC (ref 12–14.6)
PROTHROMBIN TIME: 31.4 SEC (ref 12–14.6)
PROTHROMBIN TIME: 32.3 SEC (ref 12–14.6)
PROTHROMBIN TIME: 32.3 SEC (ref 12–14.6)
PROTHROMBIN TIME: 34.7 SEC (ref 12–14.6)
RBC # BLD AUTO: 4.22 M/UL (ref 4.2–5.4)
RBC # BLD AUTO: 4.36 M/UL (ref 4.2–5.4)
RBC # BLD AUTO: 4.4 M/UL (ref 4.2–5.4)
RBC # BLD AUTO: 4.4 M/UL (ref 4.2–5.4)
RBC # BLD AUTO: 4.56 M/UL (ref 4.2–5.4)
RBC # BLD AUTO: 4.64 M/UL (ref 4.2–5.4)
RBC # BLD AUTO: 4.91 M/UL (ref 4.2–5.4)
RBC BLD AUTO: PRESENT
SAO2 % BLDA: 100 % (ref 93–99)
SAO2 % BLDA: 83 % (ref 93–99)
SAO2 % BLDA: 85 % (ref 93–99)
SAO2 % BLDA: 88 % (ref 93–99)
SAO2 % BLDA: 93 % (ref 93–99)
SAO2 % BLDA: 95 % (ref 93–99)
SAO2 % BLDA: 95 % (ref 93–99)
SAO2 % BLDA: 96 % (ref 93–99)
SAO2 % BLDA: 97 % (ref 93–99)
SAO2 % BLDA: 99 % (ref 93–99)
SAO2 % BLDA: 99 % (ref 93–99)
SIGNIFICANT IND 70042: ABNORMAL
SIGNIFICANT IND 70042: NORMAL
SIGNIFICANT IND 70042: NORMAL
SITE SITE: ABNORMAL
SITE SITE: NORMAL
SITE SITE: NORMAL
SMUDGE CELLS BLD QL SMEAR: NORMAL
SODIUM SERPL-SCNC: 130 MMOL/L (ref 135–145)
SODIUM SERPL-SCNC: 130 MMOL/L (ref 135–145)
SODIUM SERPL-SCNC: 132 MMOL/L (ref 135–145)
SODIUM SERPL-SCNC: 135 MMOL/L (ref 135–145)
SODIUM SERPL-SCNC: 136 MMOL/L (ref 135–145)
SODIUM SERPL-SCNC: 136 MMOL/L (ref 135–145)
SODIUM SERPL-SCNC: 137 MMOL/L (ref 135–145)
SODIUM SERPL-SCNC: 138 MMOL/L (ref 135–145)
SOURCE SOURCE: ABNORMAL
SOURCE SOURCE: NORMAL
SOURCE SOURCE: NORMAL
SPECIMEN DRAWN FROM PATIENT: ABNORMAL
SPECIMEN DRAWN FROM PATIENT: NORMAL
SPECIMEN DRAWN FROM PATIENT: NORMAL
TRIGL SERPL-MCNC: 103 MG/DL (ref 0–149)
TRIGL SERPL-MCNC: 106 MG/DL (ref 0–149)
TROPONIN I SERPL-MCNC: 0.03 NG/ML (ref 0–0.04)
TROPONIN I SERPL-MCNC: 2.47 NG/ML (ref 0–0.04)
TROPONIN I SERPL-MCNC: 2.5 NG/ML (ref 0–0.04)
TROPONIN I SERPL-MCNC: 2.95 NG/ML (ref 0–0.04)
TROPONIN I SERPL-MCNC: <0.01 NG/ML (ref 0–0.04)
TSH SERPL DL<=0.005 MIU/L-ACNC: 6.39 UIU/ML (ref 0.38–5.33)
WBC # BLD AUTO: 13.2 K/UL (ref 4.8–10.8)
WBC # BLD AUTO: 15.7 K/UL (ref 4.8–10.8)
WBC # BLD AUTO: 16.2 K/UL (ref 4.8–10.8)
WBC # BLD AUTO: 21.1 K/UL (ref 4.8–10.8)
WBC # BLD AUTO: 25.2 K/UL (ref 4.8–10.8)
WBC # BLD AUTO: 30.6 K/UL (ref 4.8–10.8)
WBC # BLD AUTO: 7.8 K/UL (ref 4.8–10.8)

## 2018-01-01 PROCEDURE — 93005 ELECTROCARDIOGRAM TRACING: CPT | Performed by: EMERGENCY MEDICINE

## 2018-01-01 PROCEDURE — 71045 X-RAY EXAM CHEST 1 VIEW: CPT

## 2018-01-01 PROCEDURE — 700101 HCHG RX REV CODE 250: Performed by: INTERNAL MEDICINE

## 2018-01-01 PROCEDURE — 700111 HCHG RX REV CODE 636 W/ 250 OVERRIDE (IP): Performed by: INTERNAL MEDICINE

## 2018-01-01 PROCEDURE — 84145 PROCALCITONIN (PCT): CPT

## 2018-01-01 PROCEDURE — 99233 SBSQ HOSP IP/OBS HIGH 50: CPT | Performed by: HOSPITALIST

## 2018-01-01 PROCEDURE — 700111 HCHG RX REV CODE 636 W/ 250 OVERRIDE (IP): Performed by: HOSPITALIST

## 2018-01-01 PROCEDURE — 700105 HCHG RX REV CODE 258: Performed by: INTERNAL MEDICINE

## 2018-01-01 PROCEDURE — A9270 NON-COVERED ITEM OR SERVICE: HCPCS | Performed by: INTERNAL MEDICINE

## 2018-01-01 PROCEDURE — 85610 PROTHROMBIN TIME: CPT

## 2018-01-01 PROCEDURE — A9270 NON-COVERED ITEM OR SERVICE: HCPCS | Performed by: HOSPITALIST

## 2018-01-01 PROCEDURE — 85027 COMPLETE CBC AUTOMATED: CPT

## 2018-01-01 PROCEDURE — 84100 ASSAY OF PHOSPHORUS: CPT

## 2018-01-01 PROCEDURE — 94003 VENT MGMT INPAT SUBQ DAY: CPT

## 2018-01-01 PROCEDURE — 700111 HCHG RX REV CODE 636 W/ 250 OVERRIDE (IP)

## 2018-01-01 PROCEDURE — 90935 HEMODIALYSIS ONE EVALUATION: CPT

## 2018-01-01 PROCEDURE — 700102 HCHG RX REV CODE 250 W/ 637 OVERRIDE(OP): Performed by: HOSPITALIST

## 2018-01-01 PROCEDURE — 80074 ACUTE HEPATITIS PANEL: CPT

## 2018-01-01 PROCEDURE — 94640 AIRWAY INHALATION TREATMENT: CPT

## 2018-01-01 PROCEDURE — 85730 THROMBOPLASTIN TIME PARTIAL: CPT | Mod: 91

## 2018-01-01 PROCEDURE — 82803 BLOOD GASES ANY COMBINATION: CPT

## 2018-01-01 PROCEDURE — 94760 N-INVAS EAR/PLS OXIMETRY 1: CPT

## 2018-01-01 PROCEDURE — 87077 CULTURE AEROBIC IDENTIFY: CPT | Mod: 91

## 2018-01-01 PROCEDURE — 302136 NUTRITION PUMP: Performed by: INTERNAL MEDICINE

## 2018-01-01 PROCEDURE — 5A1955Z RESPIRATORY VENTILATION, GREATER THAN 96 CONSECUTIVE HOURS: ICD-10-PCS | Performed by: INTERNAL MEDICINE

## 2018-01-01 PROCEDURE — 87150 DNA/RNA AMPLIFIED PROBE: CPT

## 2018-01-01 PROCEDURE — 86022 PLATELET ANTIBODIES: CPT

## 2018-01-01 PROCEDURE — 84443 ASSAY THYROID STIM HORMONE: CPT

## 2018-01-01 PROCEDURE — 93308 TTE F-UP OR LMTD: CPT

## 2018-01-01 PROCEDURE — 82962 GLUCOSE BLOOD TEST: CPT | Mod: 91

## 2018-01-01 PROCEDURE — 700102 HCHG RX REV CODE 250 W/ 637 OVERRIDE(OP): Performed by: INTERNAL MEDICINE

## 2018-01-01 PROCEDURE — 96375 TX/PRO/DX INJ NEW DRUG ADDON: CPT

## 2018-01-01 PROCEDURE — 80048 BASIC METABOLIC PNL TOTAL CA: CPT

## 2018-01-01 PROCEDURE — 700101 HCHG RX REV CODE 250

## 2018-01-01 PROCEDURE — 37799 UNLISTED PX VASCULAR SURGERY: CPT

## 2018-01-01 PROCEDURE — 700105 HCHG RX REV CODE 258: Performed by: HOSPITALIST

## 2018-01-01 PROCEDURE — P9047 ALBUMIN (HUMAN), 25%, 50ML: HCPCS | Performed by: INTERNAL MEDICINE

## 2018-01-01 PROCEDURE — 700111 HCHG RX REV CODE 636 W/ 250 OVERRIDE (IP): Performed by: EMERGENCY MEDICINE

## 2018-01-01 PROCEDURE — 83605 ASSAY OF LACTIC ACID: CPT

## 2018-01-01 PROCEDURE — 36415 COLL VENOUS BLD VENIPUNCTURE: CPT

## 2018-01-01 PROCEDURE — 85025 COMPLETE CBC W/AUTO DIFF WBC: CPT

## 2018-01-01 PROCEDURE — 80053 COMPREHEN METABOLIC PANEL: CPT

## 2018-01-01 PROCEDURE — 03HY32Z INSERTION OF MONITORING DEVICE INTO UPPER ARTERY, PERCUTANEOUS APPROACH: ICD-10-PCS | Performed by: INTERNAL MEDICINE

## 2018-01-01 PROCEDURE — 5A1D70Z PERFORMANCE OF URINARY FILTRATION, INTERMITTENT, LESS THAN 6 HOURS PER DAY: ICD-10-PCS | Performed by: INTERNAL MEDICINE

## 2018-01-01 PROCEDURE — 85007 BL SMEAR W/DIFF WBC COUNT: CPT

## 2018-01-01 PROCEDURE — 94002 VENT MGMT INPAT INIT DAY: CPT

## 2018-01-01 PROCEDURE — 96365 THER/PROPH/DIAG IV INF INIT: CPT

## 2018-01-01 PROCEDURE — B543ZZA ULTRASONOGRAPHY OF RIGHT JUGULAR VEINS, GUIDANCE: ICD-10-PCS | Performed by: SURGERY

## 2018-01-01 PROCEDURE — 85730 THROMBOPLASTIN TIME PARTIAL: CPT

## 2018-01-01 PROCEDURE — 87205 SMEAR GRAM STAIN: CPT

## 2018-01-01 PROCEDURE — 83880 ASSAY OF NATRIURETIC PEPTIDE: CPT

## 2018-01-01 PROCEDURE — 82962 GLUCOSE BLOOD TEST: CPT

## 2018-01-01 PROCEDURE — 770022 HCHG ROOM/CARE - ICU (200)

## 2018-01-01 PROCEDURE — 80076 HEPATIC FUNCTION PANEL: CPT

## 2018-01-01 PROCEDURE — 82140 ASSAY OF AMMONIA: CPT

## 2018-01-01 PROCEDURE — 700102 HCHG RX REV CODE 250 W/ 637 OVERRIDE(OP): Performed by: EMERGENCY MEDICINE

## 2018-01-01 PROCEDURE — 83735 ASSAY OF MAGNESIUM: CPT

## 2018-01-01 PROCEDURE — 84484 ASSAY OF TROPONIN QUANT: CPT

## 2018-01-01 PROCEDURE — 83605 ASSAY OF LACTIC ACID: CPT | Mod: 91

## 2018-01-01 PROCEDURE — 86706 HEP B SURFACE ANTIBODY: CPT

## 2018-01-01 PROCEDURE — 93010 ELECTROCARDIOGRAM REPORT: CPT | Performed by: INTERNAL MEDICINE

## 2018-01-01 PROCEDURE — 31500 INSERT EMERGENCY AIRWAY: CPT

## 2018-01-01 PROCEDURE — 96368 THER/DIAG CONCURRENT INF: CPT

## 2018-01-01 PROCEDURE — 76705 ECHO EXAM OF ABDOMEN: CPT

## 2018-01-01 PROCEDURE — 700105 HCHG RX REV CODE 258

## 2018-01-01 PROCEDURE — 36556 INSERT NON-TUNNEL CV CATH: CPT

## 2018-01-01 PROCEDURE — 02HV33Z INSERTION OF INFUSION DEVICE INTO SUPERIOR VENA CAVA, PERCUTANEOUS APPROACH: ICD-10-PCS | Performed by: SURGERY

## 2018-01-01 PROCEDURE — 0BH17EZ INSERTION OF ENDOTRACHEAL AIRWAY INTO TRACHEA, VIA NATURAL OR ARTIFICIAL OPENING: ICD-10-PCS | Performed by: INTERNAL MEDICINE

## 2018-01-01 PROCEDURE — 99291 CRITICAL CARE FIRST HOUR: CPT | Performed by: HOSPITALIST

## 2018-01-01 PROCEDURE — 36620 INSERTION CATHETER ARTERY: CPT

## 2018-01-01 PROCEDURE — 99291 CRITICAL CARE FIRST HOUR: CPT

## 2018-01-01 PROCEDURE — 87186 SC STD MICRODIL/AGAR DIL: CPT

## 2018-01-01 PROCEDURE — 302128 INFUSION PUMP: Performed by: INTERNAL MEDICINE

## 2018-01-01 PROCEDURE — A9270 NON-COVERED ITEM OR SERVICE: HCPCS | Performed by: EMERGENCY MEDICINE

## 2018-01-01 PROCEDURE — B543ZZA ULTRASONOGRAPHY OF RIGHT JUGULAR VEINS, GUIDANCE: ICD-10-PCS | Performed by: INTERNAL MEDICINE

## 2018-01-01 PROCEDURE — 84478 ASSAY OF TRIGLYCERIDES: CPT

## 2018-01-01 PROCEDURE — 82533 TOTAL CORTISOL: CPT

## 2018-01-01 PROCEDURE — 51798 US URINE CAPACITY MEASURE: CPT

## 2018-01-01 PROCEDURE — 87070 CULTURE OTHR SPECIMN AEROBIC: CPT

## 2018-01-01 PROCEDURE — 96367 TX/PROPH/DG ADDL SEQ IV INF: CPT

## 2018-01-01 PROCEDURE — 87040 BLOOD CULTURE FOR BACTERIA: CPT | Mod: 91

## 2018-01-01 PROCEDURE — 93005 ELECTROCARDIOGRAM TRACING: CPT | Performed by: HOSPITALIST

## 2018-01-01 PROCEDURE — 93308 TTE F-UP OR LMTD: CPT | Mod: 26 | Performed by: INTERNAL MEDICINE

## 2018-01-01 PROCEDURE — 99223 1ST HOSP IP/OBS HIGH 75: CPT | Performed by: HOSPITALIST

## 2018-01-01 PROCEDURE — 02HV33Z INSERTION OF INFUSION DEVICE INTO SUPERIOR VENA CAVA, PERCUTANEOUS APPROACH: ICD-10-PCS | Performed by: INTERNAL MEDICINE

## 2018-01-01 PROCEDURE — C1751 CATH, INF, PER/CENT/MIDLINE: HCPCS

## 2018-01-01 RX ORDER — MORPHINE SULFATE 10 MG/ML
5 INJECTION, SOLUTION INTRAMUSCULAR; INTRAVENOUS
Status: DISCONTINUED | OUTPATIENT
Start: 2018-01-01 | End: 2018-02-26 | Stop reason: HOSPADM

## 2018-01-01 RX ORDER — ONDANSETRON 4 MG/1
4 TABLET, ORALLY DISINTEGRATING ORAL EVERY 4 HOURS PRN
Status: DISCONTINUED | OUTPATIENT
Start: 2018-01-01 | End: 2018-01-01

## 2018-01-01 RX ORDER — HEPARIN SODIUM 1000 [USP'U]/ML
1750 INJECTION, SOLUTION INTRAVENOUS; SUBCUTANEOUS
Status: DISCONTINUED | OUTPATIENT
Start: 2018-01-01 | End: 2018-01-01

## 2018-01-01 RX ORDER — SODIUM CHLORIDE 9 MG/ML
INJECTION, SOLUTION INTRAVENOUS
Status: ACTIVE
Start: 2018-01-01 | End: 2018-01-01

## 2018-01-01 RX ORDER — NOREPINEPHRINE BITARTRATE 1 MG/ML
INJECTION, SOLUTION INTRAVENOUS
Status: ACTIVE
Start: 2018-01-01 | End: 2018-01-01

## 2018-01-01 RX ORDER — CHLORHEXIDINE GLUCONATE ORAL RINSE 1.2 MG/ML
15 SOLUTION DENTAL 2 TIMES DAILY
Status: DISCONTINUED | OUTPATIENT
Start: 2018-01-01 | End: 2018-01-01

## 2018-01-01 RX ORDER — LORAZEPAM 2 MG/ML
2-4 INJECTION INTRAMUSCULAR
Status: DISCONTINUED | OUTPATIENT
Start: 2018-01-01 | End: 2018-01-01

## 2018-01-01 RX ORDER — ESMOLOL HYDROCHLORIDE 10 MG/ML
0-200 INJECTION, SOLUTION INTRAVENOUS CONTINUOUS
Status: DISCONTINUED | OUTPATIENT
Start: 2018-01-01 | End: 2018-01-01

## 2018-01-01 RX ORDER — ETOMIDATE 2 MG/ML
20 INJECTION INTRAVENOUS ONCE
Status: COMPLETED | OUTPATIENT
Start: 2018-01-01 | End: 2018-01-01

## 2018-01-01 RX ORDER — METHYLPREDNISOLONE SODIUM SUCCINATE 40 MG/ML
40 INJECTION, POWDER, LYOPHILIZED, FOR SOLUTION INTRAMUSCULAR; INTRAVENOUS EVERY 6 HOURS
Status: DISCONTINUED | OUTPATIENT
Start: 2018-01-01 | End: 2018-01-01

## 2018-01-01 RX ORDER — MORPHINE SULFATE 10 MG/ML
10 INJECTION, SOLUTION INTRAMUSCULAR; INTRAVENOUS
Status: DISCONTINUED | OUTPATIENT
Start: 2018-01-01 | End: 2018-02-26 | Stop reason: HOSPADM

## 2018-01-01 RX ORDER — LORAZEPAM 2 MG/ML
INJECTION INTRAMUSCULAR
Status: DISCONTINUED
Start: 2018-01-01 | End: 2018-02-26 | Stop reason: HOSPADM

## 2018-01-01 RX ORDER — AZITHROMYCIN 500 MG/1
500 INJECTION, POWDER, LYOPHILIZED, FOR SOLUTION INTRAVENOUS ONCE
Status: DISCONTINUED | OUTPATIENT
Start: 2018-01-01 | End: 2018-01-01

## 2018-01-01 RX ORDER — METOCLOPRAMIDE HYDROCHLORIDE 5 MG/ML
10 INJECTION INTRAMUSCULAR; INTRAVENOUS EVERY 6 HOURS
Status: COMPLETED | OUTPATIENT
Start: 2018-01-01 | End: 2018-01-01

## 2018-01-01 RX ORDER — HEPARIN SODIUM 1000 [USP'U]/ML
3000 INJECTION, SOLUTION INTRAVENOUS; SUBCUTANEOUS
Status: DISCONTINUED | OUTPATIENT
Start: 2018-01-01 | End: 2018-01-01

## 2018-01-01 RX ORDER — 0.9 % SODIUM CHLORIDE 0.9 %
INTRAVENOUS SOLUTION INTRAVENOUS
Status: ACTIVE
Start: 2018-01-01 | End: 2018-01-01

## 2018-01-01 RX ORDER — FAMOTIDINE 20 MG/1
20 TABLET, FILM COATED ORAL EVERY 12 HOURS
Status: DISCONTINUED | OUTPATIENT
Start: 2018-01-01 | End: 2018-01-01

## 2018-01-01 RX ORDER — HEPARIN SODIUM 1000 [USP'U]/ML
5000 INJECTION, SOLUTION INTRAVENOUS; SUBCUTANEOUS PRN
Status: DISCONTINUED | OUTPATIENT
Start: 2018-01-01 | End: 2018-01-01

## 2018-01-01 RX ORDER — POLYETHYLENE GLYCOL 3350 17 G/17G
1 POWDER, FOR SOLUTION ORAL
Status: DISCONTINUED | OUTPATIENT
Start: 2018-01-01 | End: 2018-01-01

## 2018-01-01 RX ORDER — MORPHINE SULFATE 100 MG/5ML
10 SOLUTION ORAL
Status: DISCONTINUED | OUTPATIENT
Start: 2018-01-01 | End: 2018-02-26 | Stop reason: HOSPADM

## 2018-01-01 RX ORDER — HEPARIN SODIUM 1000 [USP'U]/ML
9000 INJECTION, SOLUTION INTRAVENOUS; SUBCUTANEOUS ONCE
Status: COMPLETED | OUTPATIENT
Start: 2018-01-01 | End: 2018-01-01

## 2018-01-01 RX ORDER — SODIUM CHLORIDE 9 MG/ML
INJECTION, SOLUTION INTRAVENOUS
Status: COMPLETED
Start: 2018-01-01 | End: 2018-01-01

## 2018-01-01 RX ORDER — AMOXICILLIN 250 MG
2 CAPSULE ORAL 2 TIMES DAILY
Status: DISCONTINUED | OUTPATIENT
Start: 2018-01-01 | End: 2018-01-01

## 2018-01-01 RX ORDER — LORAZEPAM 2 MG/ML
2 INJECTION INTRAMUSCULAR
Status: DISCONTINUED | OUTPATIENT
Start: 2018-01-01 | End: 2018-02-26 | Stop reason: HOSPADM

## 2018-01-01 RX ORDER — BISACODYL 10 MG
10 SUPPOSITORY, RECTAL RECTAL
Status: DISCONTINUED | OUTPATIENT
Start: 2018-01-01 | End: 2018-01-01

## 2018-01-01 RX ORDER — TOBRAMYCIN 3 MG/ML
1 SOLUTION/ DROPS OPHTHALMIC EVERY 4 HOURS
Status: DISCONTINUED | OUTPATIENT
Start: 2018-01-01 | End: 2018-01-01

## 2018-01-01 RX ORDER — AZITHROMYCIN 250 MG/1
500 TABLET, FILM COATED ORAL ONCE
Status: DISCONTINUED | OUTPATIENT
Start: 2018-01-01 | End: 2018-01-01

## 2018-01-01 RX ORDER — ALBUMIN (HUMAN) 12.5 G/50ML
12.5 SOLUTION INTRAVENOUS
Status: DISCONTINUED | OUTPATIENT
Start: 2018-01-01 | End: 2018-01-01

## 2018-01-01 RX ORDER — HEPARIN SODIUM 1000 [USP'U]/ML
INJECTION, SOLUTION INTRAVENOUS; SUBCUTANEOUS PRN
Status: CANCELLED | OUTPATIENT
Start: 2018-01-01

## 2018-01-01 RX ORDER — ONDANSETRON 4 MG/1
8 TABLET, ORALLY DISINTEGRATING ORAL EVERY 8 HOURS PRN
Status: DISCONTINUED | OUTPATIENT
Start: 2018-01-01 | End: 2018-02-26 | Stop reason: HOSPADM

## 2018-01-01 RX ORDER — DEXTROSE MONOHYDRATE 25 G/50ML
INJECTION, SOLUTION INTRAVENOUS
Status: COMPLETED
Start: 2018-01-01 | End: 2018-01-01

## 2018-01-01 RX ORDER — POLYMYXIN B SULFATE AND TRIMETHOPRIM 1; 10000 MG/ML; [USP'U]/ML
1 SOLUTION OPHTHALMIC EVERY 4 HOURS
Status: DISCONTINUED | OUTPATIENT
Start: 2018-01-01 | End: 2018-01-01

## 2018-01-01 RX ORDER — DEXTROSE MONOHYDRATE 25 G/50ML
50 INJECTION, SOLUTION INTRAVENOUS ONCE
Status: COMPLETED | OUTPATIENT
Start: 2018-01-01 | End: 2018-01-01

## 2018-01-01 RX ORDER — ATROPINE SULFATE 10 MG/ML
2 SOLUTION/ DROPS OPHTHALMIC EVERY 4 HOURS PRN
Status: DISCONTINUED | OUTPATIENT
Start: 2018-01-01 | End: 2018-02-26 | Stop reason: HOSPADM

## 2018-01-01 RX ORDER — DEXTROSE MONOHYDRATE 25 G/50ML
25 INJECTION, SOLUTION INTRAVENOUS
Status: DISCONTINUED | OUTPATIENT
Start: 2018-01-01 | End: 2018-01-01

## 2018-01-01 RX ORDER — FUROSEMIDE 20 MG/1
20 TABLET ORAL DAILY
COMMUNITY

## 2018-01-01 RX ORDER — PROMETHAZINE HYDROCHLORIDE 25 MG/1
12.5-25 TABLET ORAL EVERY 4 HOURS PRN
Status: DISCONTINUED | OUTPATIENT
Start: 2018-01-01 | End: 2018-01-01

## 2018-01-01 RX ORDER — FUROSEMIDE 10 MG/ML
80 INJECTION INTRAMUSCULAR; INTRAVENOUS
Status: DISCONTINUED | OUTPATIENT
Start: 2018-01-01 | End: 2018-01-01

## 2018-01-01 RX ORDER — ONDANSETRON 2 MG/ML
8 INJECTION INTRAMUSCULAR; INTRAVENOUS EVERY 8 HOURS PRN
Status: DISCONTINUED | OUTPATIENT
Start: 2018-01-01 | End: 2018-02-26 | Stop reason: HOSPADM

## 2018-01-01 RX ORDER — LIDOCAINE HYDROCHLORIDE 10 MG/ML
1-2 INJECTION, SOLUTION INFILTRATION; PERINEURAL
Status: DISCONTINUED | OUTPATIENT
Start: 2018-01-01 | End: 2018-01-01

## 2018-01-01 RX ORDER — SODIUM CHLORIDE 9 MG/ML
500 INJECTION, SOLUTION INTRAVENOUS
Status: DISCONTINUED | OUTPATIENT
Start: 2018-01-01 | End: 2018-01-01

## 2018-01-01 RX ORDER — ACETAMINOPHEN 325 MG/1
650 TABLET ORAL EVERY 6 HOURS PRN
Status: DISCONTINUED | OUTPATIENT
Start: 2018-01-01 | End: 2018-01-01

## 2018-01-01 RX ORDER — IPRATROPIUM BROMIDE AND ALBUTEROL SULFATE 2.5; .5 MG/3ML; MG/3ML
3 SOLUTION RESPIRATORY (INHALATION)
Status: DISCONTINUED | OUTPATIENT
Start: 2018-01-01 | End: 2018-01-01

## 2018-01-01 RX ORDER — FAMOTIDINE 20 MG/1
20 TABLET, FILM COATED ORAL EVERY EVENING
Status: DISCONTINUED | OUTPATIENT
Start: 2018-01-01 | End: 2018-01-01

## 2018-01-01 RX ORDER — ONDANSETRON 2 MG/ML
4 INJECTION INTRAMUSCULAR; INTRAVENOUS EVERY 4 HOURS PRN
Status: DISCONTINUED | OUTPATIENT
Start: 2018-01-01 | End: 2018-01-01

## 2018-01-01 RX ORDER — AZITHROMYCIN 250 MG/1
250 TABLET, FILM COATED ORAL EVERY 24 HOURS
Status: COMPLETED | OUTPATIENT
Start: 2018-01-01 | End: 2018-01-01

## 2018-01-01 RX ORDER — NOREPINEPHRINE BITARTRATE 1 MG/ML
INJECTION, SOLUTION INTRAVENOUS
Status: COMPLETED
Start: 2018-01-01 | End: 2018-01-01

## 2018-01-01 RX ORDER — ROCURONIUM BROMIDE 10 MG/ML
60 INJECTION, SOLUTION INTRAVENOUS ONCE
Status: COMPLETED | OUTPATIENT
Start: 2018-01-01 | End: 2018-01-01

## 2018-01-01 RX ORDER — LORAZEPAM 2 MG/ML
4 INJECTION INTRAMUSCULAR
Status: DISCONTINUED | OUTPATIENT
Start: 2018-01-01 | End: 2018-02-26 | Stop reason: HOSPADM

## 2018-01-01 RX ORDER — PROMETHAZINE HYDROCHLORIDE 25 MG/1
12.5-25 SUPPOSITORY RECTAL EVERY 4 HOURS PRN
Status: DISCONTINUED | OUTPATIENT
Start: 2018-01-01 | End: 2018-01-01

## 2018-01-01 RX ADMIN — DEXMEDETOMIDINE HYDROCHLORIDE 0.6 MCG/KG/HR: 100 INJECTION, SOLUTION INTRAVENOUS at 05:37

## 2018-01-01 RX ADMIN — EPOPROSTENOL SODIUM 0.02 MCG/KG/MIN: 1.5 INJECTION, POWDER, LYOPHILIZED, FOR SOLUTION INTRAVENOUS at 09:01

## 2018-01-01 RX ADMIN — STANDARDIZED SENNA CONCENTRATE AND DOCUSATE SODIUM 2 TABLET: 8.6; 5 TABLET, FILM COATED ORAL at 20:50

## 2018-01-01 RX ADMIN — IPRATROPIUM BROMIDE AND ALBUTEROL SULFATE 3 ML: .5; 3 SOLUTION RESPIRATORY (INHALATION) at 17:12

## 2018-01-01 RX ADMIN — AMPICILLIN SODIUM AND SULBACTAM SODIUM 3 G: 2; 1 INJECTION, POWDER, FOR SOLUTION INTRAMUSCULAR; INTRAVENOUS at 00:04

## 2018-01-01 RX ADMIN — DEXMEDETOMIDINE HYDROCHLORIDE 0.9 MCG/KG/HR: 100 INJECTION, SOLUTION INTRAVENOUS at 05:16

## 2018-01-01 RX ADMIN — DEXMEDETOMIDINE HYDROCHLORIDE 1 MCG/KG/HR: 100 INJECTION, SOLUTION INTRAVENOUS at 04:19

## 2018-01-01 RX ADMIN — PHENYLEPHRINE HYDROCHLORIDE 225 MCG/MIN: 10 INJECTION INTRAVENOUS at 06:39

## 2018-01-01 RX ADMIN — TAZOBACTAM SODIUM AND PIPERACILLIN SODIUM 4.5 G: 500; 4 INJECTION, SOLUTION INTRAVENOUS at 20:41

## 2018-01-01 RX ADMIN — FAMOTIDINE 20 MG: 20 TABLET, FILM COATED ORAL at 09:14

## 2018-01-01 RX ADMIN — VANCOMYCIN HYDROCHLORIDE 3000 MG: 100 INJECTION, POWDER, LYOPHILIZED, FOR SOLUTION INTRAVENOUS at 19:45

## 2018-01-01 RX ADMIN — CHLORHEXIDINE GLUCONATE 15 ML: 1.2 RINSE ORAL at 10:03

## 2018-01-01 RX ADMIN — TOBRAMYCIN 1 DROP: 3 SOLUTION/ DROPS OPHTHALMIC at 14:55

## 2018-01-01 RX ADMIN — DEXMEDETOMIDINE HYDROCHLORIDE 0.5 MCG/KG/HR: 100 INJECTION, SOLUTION INTRAVENOUS at 00:01

## 2018-01-01 RX ADMIN — HEPARIN SODIUM 1750 UNITS: 1000 INJECTION, SOLUTION INTRAVENOUS; SUBCUTANEOUS at 11:38

## 2018-01-01 RX ADMIN — EPOPROSTENOL SODIUM 0.02 MCG/KG/MIN: 1.5 INJECTION, POWDER, LYOPHILIZED, FOR SOLUTION INTRAVENOUS at 17:12

## 2018-01-01 RX ADMIN — VASOPRESSIN 0.03 UNITS/MIN: 20 INJECTION INTRAVENOUS at 23:34

## 2018-01-01 RX ADMIN — METOCLOPRAMIDE 10 MG: 5 INJECTION, SOLUTION INTRAMUSCULAR; INTRAVENOUS at 12:33

## 2018-01-01 RX ADMIN — HEPARIN SODIUM 750 UNITS: 5000 INJECTION, SOLUTION INTRAVENOUS at 15:40

## 2018-01-01 RX ADMIN — DEXMEDETOMIDINE HYDROCHLORIDE 0.6 MCG/KG/HR: 100 INJECTION, SOLUTION INTRAVENOUS at 23:50

## 2018-01-01 RX ADMIN — CHLORHEXIDINE GLUCONATE 15 ML: 1.2 RINSE ORAL at 09:40

## 2018-01-01 RX ADMIN — VASOPRESSIN 0.03 UNITS/MIN: 20 INJECTION INTRAVENOUS at 03:12

## 2018-01-01 RX ADMIN — AMPICILLIN SODIUM AND SULBACTAM SODIUM 3 G: 2; 1 INJECTION, POWDER, FOR SOLUTION INTRAMUSCULAR; INTRAVENOUS at 00:40

## 2018-01-01 RX ADMIN — FAMOTIDINE 20 MG: 20 TABLET, FILM COATED ORAL at 20:36

## 2018-01-01 RX ADMIN — CHLORHEXIDINE GLUCONATE 15 ML: 1.2 RINSE ORAL at 20:39

## 2018-01-01 RX ADMIN — METOCLOPRAMIDE 10 MG: 5 INJECTION, SOLUTION INTRAMUSCULAR; INTRAVENOUS at 06:26

## 2018-01-01 RX ADMIN — AMPICILLIN SODIUM AND SULBACTAM SODIUM 3 G: 2; 1 INJECTION, POWDER, FOR SOLUTION INTRAMUSCULAR; INTRAVENOUS at 01:53

## 2018-01-01 RX ADMIN — DEXMEDETOMIDINE HYDROCHLORIDE 0.9 MCG/KG/HR: 100 INJECTION, SOLUTION INTRAVENOUS at 04:32

## 2018-01-01 RX ADMIN — IPRATROPIUM BROMIDE AND ALBUTEROL SULFATE 3 ML: .5; 3 SOLUTION RESPIRATORY (INHALATION) at 15:03

## 2018-01-01 RX ADMIN — FAMOTIDINE 20 MG: 20 TABLET, FILM COATED ORAL at 20:39

## 2018-01-01 RX ADMIN — IPRATROPIUM BROMIDE AND ALBUTEROL SULFATE 3 ML: .5; 3 SOLUTION RESPIRATORY (INHALATION) at 14:36

## 2018-01-01 RX ADMIN — ETOMIDATE 20 MG: 2 INJECTION INTRAVENOUS at 10:33

## 2018-01-01 RX ADMIN — METOCLOPRAMIDE 10 MG: 5 INJECTION, SOLUTION INTRAMUSCULAR; INTRAVENOUS at 01:13

## 2018-01-01 RX ADMIN — BUMETANIDE 1 MG: 0.25 INJECTION, SOLUTION INTRAMUSCULAR; INTRAVENOUS at 09:57

## 2018-01-01 RX ADMIN — AMIODARONE HYDROCHLORIDE 0.5 MG/MIN: 50 INJECTION, SOLUTION INTRAVENOUS at 23:43

## 2018-01-01 RX ADMIN — SODIUM BICARBONATE 100 MEQ: 84 INJECTION, SOLUTION INTRAVENOUS at 12:28

## 2018-01-01 RX ADMIN — STANDARDIZED SENNA CONCENTRATE AND DOCUSATE SODIUM 2 TABLET: 8.6; 5 TABLET, FILM COATED ORAL at 21:38

## 2018-01-01 RX ADMIN — IPRATROPIUM BROMIDE AND ALBUTEROL SULFATE 3 ML: .5; 3 SOLUTION RESPIRATORY (INHALATION) at 22:44

## 2018-01-01 RX ADMIN — MAGNESIUM HYDROXIDE 30 ML: 400 SUSPENSION ORAL at 08:36

## 2018-01-01 RX ADMIN — PROPOFOL 10 MCG/KG/MIN: 10 INJECTION, EMULSION INTRAVENOUS at 05:33

## 2018-01-01 RX ADMIN — IPRATROPIUM BROMIDE AND ALBUTEROL SULFATE 3 ML: .5; 3 SOLUTION RESPIRATORY (INHALATION) at 21:54

## 2018-01-01 RX ADMIN — FUROSEMIDE 5 MG/HR: 10 INJECTION, SOLUTION INTRAMUSCULAR; INTRAVENOUS at 00:15

## 2018-01-01 RX ADMIN — METOCLOPRAMIDE 10 MG: 5 INJECTION, SOLUTION INTRAMUSCULAR; INTRAVENOUS at 05:50

## 2018-01-01 RX ADMIN — PHENYLEPHRINE HYDROCHLORIDE 240 MCG/MIN: 10 INJECTION INTRAVENOUS at 05:57

## 2018-01-01 RX ADMIN — AMIODARONE HYDROCHLORIDE 0.5 MG/MIN: 50 INJECTION, SOLUTION INTRAVENOUS at 10:19

## 2018-01-01 RX ADMIN — AZITHROMYCIN 250 MG: 250 TABLET, FILM COATED ORAL at 08:36

## 2018-01-01 RX ADMIN — RIFAXIMIN 550 MG: 550 TABLET ORAL at 09:41

## 2018-01-01 RX ADMIN — IPRATROPIUM BROMIDE AND ALBUTEROL SULFATE 3 ML: .5; 3 SOLUTION RESPIRATORY (INHALATION) at 14:42

## 2018-01-01 RX ADMIN — PHENYLEPHRINE HYDROCHLORIDE 275 MCG/MIN: 10 INJECTION INTRAVENOUS at 14:46

## 2018-01-01 RX ADMIN — CHLORHEXIDINE GLUCONATE 15 ML: 1.2 RINSE ORAL at 08:50

## 2018-01-01 RX ADMIN — IPRATROPIUM BROMIDE AND ALBUTEROL SULFATE 3 ML: .5; 3 SOLUTION RESPIRATORY (INHALATION) at 18:13

## 2018-01-01 RX ADMIN — IPRATROPIUM BROMIDE AND ALBUTEROL SULFATE 3 ML: .5; 3 SOLUTION RESPIRATORY (INHALATION) at 14:14

## 2018-01-01 RX ADMIN — TOBRAMYCIN 1 DROP: 3 SOLUTION/ DROPS OPHTHALMIC at 21:46

## 2018-01-01 RX ADMIN — DEXMEDETOMIDINE HYDROCHLORIDE 0.7 MCG/KG/HR: 100 INJECTION, SOLUTION INTRAVENOUS at 15:39

## 2018-01-01 RX ADMIN — IPRATROPIUM BROMIDE AND ALBUTEROL SULFATE 3 ML: .5; 3 SOLUTION RESPIRATORY (INHALATION) at 18:32

## 2018-01-01 RX ADMIN — TAZOBACTAM SODIUM AND PIPERACILLIN SODIUM 4.5 G: 500; 4 INJECTION, SOLUTION INTRAVENOUS at 22:40

## 2018-01-01 RX ADMIN — PHENYLEPHRINE HYDROCHLORIDE 150 MCG/MIN: 10 INJECTION INTRAVENOUS at 15:00

## 2018-01-01 RX ADMIN — HEPARIN SODIUM 1950 UNITS/HR: 5000 INJECTION, SOLUTION INTRAVENOUS at 01:44

## 2018-01-01 RX ADMIN — RIFAXIMIN 550 MG: 550 TABLET ORAL at 20:39

## 2018-01-01 RX ADMIN — CHLORHEXIDINE GLUCONATE 15 ML: 1.2 RINSE ORAL at 08:01

## 2018-01-01 RX ADMIN — METHYLPREDNISOLONE SODIUM SUCCINATE 40 MG: 40 INJECTION, POWDER, FOR SOLUTION INTRAMUSCULAR; INTRAVENOUS at 13:45

## 2018-01-01 RX ADMIN — SODIUM BICARBONATE 150 MEQ: 84 INJECTION, SOLUTION INTRAVENOUS at 13:39

## 2018-01-01 RX ADMIN — DEXMEDETOMIDINE HYDROCHLORIDE 0.6 MCG/KG/HR: 100 INJECTION, SOLUTION INTRAVENOUS at 02:23

## 2018-01-01 RX ADMIN — DEXMEDETOMIDINE HYDROCHLORIDE 0.5 MCG/KG/HR: 100 INJECTION, SOLUTION INTRAVENOUS at 16:45

## 2018-01-01 RX ADMIN — DEXMEDETOMIDINE HYDROCHLORIDE 0.6 MCG/KG/HR: 100 INJECTION, SOLUTION INTRAVENOUS at 20:59

## 2018-01-01 RX ADMIN — SODIUM BICARBONATE 150 MEQ: 84 INJECTION, SOLUTION INTRAVENOUS at 00:47

## 2018-01-01 RX ADMIN — PROPOFOL 30 MCG/KG/MIN: 10 INJECTION, EMULSION INTRAVENOUS at 10:40

## 2018-01-01 RX ADMIN — IPRATROPIUM BROMIDE AND ALBUTEROL SULFATE 3 ML: .5; 3 SOLUTION RESPIRATORY (INHALATION) at 18:21

## 2018-01-01 RX ADMIN — VASOPRESSIN 0.03 UNITS/MIN: 20 INJECTION INTRAVENOUS at 15:50

## 2018-01-01 RX ADMIN — ALBUMIN (HUMAN) 12.5 G: 0.25 INJECTION, SOLUTION INTRAVENOUS at 12:33

## 2018-01-01 RX ADMIN — TOBRAMYCIN 1 DROP: 3 SOLUTION/ DROPS OPHTHALMIC at 22:18

## 2018-01-01 RX ADMIN — AMIODARONE HYDROCHLORIDE 0.5 MG/MIN: 50 INJECTION, SOLUTION INTRAVENOUS at 18:09

## 2018-01-01 RX ADMIN — DEXMEDETOMIDINE HYDROCHLORIDE 0.9 MCG/KG/HR: 100 INJECTION, SOLUTION INTRAVENOUS at 13:00

## 2018-01-01 RX ADMIN — VASOPRESSIN 0.03 UNITS/MIN: 20 INJECTION INTRAVENOUS at 04:22

## 2018-01-01 RX ADMIN — IPRATROPIUM BROMIDE AND ALBUTEROL SULFATE 3 ML: .5; 3 SOLUTION RESPIRATORY (INHALATION) at 02:12

## 2018-01-01 RX ADMIN — DEXMEDETOMIDINE HYDROCHLORIDE 0.9 MCG/KG/HR: 100 INJECTION, SOLUTION INTRAVENOUS at 15:04

## 2018-01-01 RX ADMIN — DEXMEDETOMIDINE HYDROCHLORIDE 0.9 MCG/KG/HR: 100 INJECTION, SOLUTION INTRAVENOUS at 11:12

## 2018-01-01 RX ADMIN — ESMOLOL HYDROCHLORIDE 20 MCG/KG/MIN: 10 INJECTION INTRAVENOUS at 16:20

## 2018-01-01 RX ADMIN — FAMOTIDINE 20 MG: 10 INJECTION, SOLUTION INTRAVENOUS at 11:00

## 2018-01-01 RX ADMIN — DEXMEDETOMIDINE HYDROCHLORIDE 0.7 MCG/KG/HR: 100 INJECTION, SOLUTION INTRAVENOUS at 22:48

## 2018-01-01 RX ADMIN — FAMOTIDINE 20 MG: 10 INJECTION, SOLUTION INTRAVENOUS at 10:03

## 2018-01-01 RX ADMIN — STANDARDIZED SENNA CONCENTRATE AND DOCUSATE SODIUM 2 TABLET: 8.6; 5 TABLET, FILM COATED ORAL at 08:50

## 2018-01-01 RX ADMIN — IPRATROPIUM BROMIDE AND ALBUTEROL SULFATE 3 ML: .5; 3 SOLUTION RESPIRATORY (INHALATION) at 06:21

## 2018-01-01 RX ADMIN — IPRATROPIUM BROMIDE AND ALBUTEROL SULFATE 3 ML: .5; 3 SOLUTION RESPIRATORY (INHALATION) at 02:19

## 2018-01-01 RX ADMIN — AMPICILLIN SODIUM AND SULBACTAM SODIUM 3 G: 2; 1 INJECTION, POWDER, FOR SOLUTION INTRAMUSCULAR; INTRAVENOUS at 11:35

## 2018-01-01 RX ADMIN — METOCLOPRAMIDE 10 MG: 5 INJECTION, SOLUTION INTRAMUSCULAR; INTRAVENOUS at 13:20

## 2018-01-01 RX ADMIN — PHENYLEPHRINE HYDROCHLORIDE 140 MCG/MIN: 10 INJECTION INTRAVENOUS at 03:07

## 2018-01-01 RX ADMIN — DEXMEDETOMIDINE HYDROCHLORIDE 1.5 MCG/KG/HR: 100 INJECTION, SOLUTION INTRAVENOUS at 03:03

## 2018-01-01 RX ADMIN — IPRATROPIUM BROMIDE AND ALBUTEROL SULFATE 3 ML: .5; 3 SOLUTION RESPIRATORY (INHALATION) at 06:31

## 2018-01-01 RX ADMIN — AZITHROMYCIN 250 MG: 250 TABLET, FILM COATED ORAL at 09:15

## 2018-01-01 RX ADMIN — IPRATROPIUM BROMIDE AND ALBUTEROL SULFATE 3 ML: .5; 3 SOLUTION RESPIRATORY (INHALATION) at 10:04

## 2018-01-01 RX ADMIN — DEXMEDETOMIDINE HYDROCHLORIDE 0.6 MCG/KG/HR: 100 INJECTION, SOLUTION INTRAVENOUS at 02:35

## 2018-01-01 RX ADMIN — ESMOLOL HYDROCHLORIDE 25 MCG/KG/MIN: 10 INJECTION INTRAVENOUS at 04:17

## 2018-01-01 RX ADMIN — IPRATROPIUM BROMIDE AND ALBUTEROL SULFATE 3 ML: .5; 3 SOLUTION RESPIRATORY (INHALATION) at 18:14

## 2018-01-01 RX ADMIN — DEXMEDETOMIDINE HYDROCHLORIDE 0.9 MCG/KG/HR: 100 INJECTION, SOLUTION INTRAVENOUS at 09:02

## 2018-01-01 RX ADMIN — TOBRAMYCIN 1 DROP: 3 SOLUTION/ DROPS OPHTHALMIC at 05:06

## 2018-01-01 RX ADMIN — PHENYLEPHRINE HYDROCHLORIDE 140 MCG/MIN: 10 INJECTION INTRAVENOUS at 14:00

## 2018-01-01 RX ADMIN — METHYLPREDNISOLONE SODIUM SUCCINATE 40 MG: 40 INJECTION, POWDER, FOR SOLUTION INTRAMUSCULAR; INTRAVENOUS at 00:04

## 2018-01-01 RX ADMIN — ENOXAPARIN SODIUM 100 MG: 100 INJECTION SUBCUTANEOUS at 22:18

## 2018-01-01 RX ADMIN — STANDARDIZED SENNA CONCENTRATE AND DOCUSATE SODIUM 2 TABLET: 8.6; 5 TABLET, FILM COATED ORAL at 09:00

## 2018-01-01 RX ADMIN — DEXTROSE MONOHYDRATE 25 ML: 25 INJECTION, SOLUTION INTRAVENOUS at 02:24

## 2018-01-01 RX ADMIN — AMPICILLIN SODIUM AND SULBACTAM SODIUM 3 G: 2; 1 INJECTION, POWDER, FOR SOLUTION INTRAMUSCULAR; INTRAVENOUS at 01:44

## 2018-01-01 RX ADMIN — EPOPROSTENOL SODIUM 0.02 MCG/KG/MIN: 1.5 INJECTION, POWDER, LYOPHILIZED, FOR SOLUTION INTRAVENOUS at 13:04

## 2018-01-01 RX ADMIN — TOBRAMYCIN 1 DROP: 3 SOLUTION/ DROPS OPHTHALMIC at 18:00

## 2018-01-01 RX ADMIN — DEXMEDETOMIDINE HYDROCHLORIDE 0.7 MCG/KG/HR: 100 INJECTION, SOLUTION INTRAVENOUS at 01:04

## 2018-01-01 RX ADMIN — DEXMEDETOMIDINE HYDROCHLORIDE 1 MCG/KG/HR: 100 INJECTION, SOLUTION INTRAVENOUS at 19:50

## 2018-01-01 RX ADMIN — RIFAXIMIN 550 MG: 550 TABLET ORAL at 08:01

## 2018-01-01 RX ADMIN — VASOPRESSIN 0.03 UNITS/MIN: 20 INJECTION INTRAVENOUS at 03:31

## 2018-01-01 RX ADMIN — FUROSEMIDE 5 MG/HR: 10 INJECTION, SOLUTION INTRAMUSCULAR; INTRAVENOUS at 00:05

## 2018-01-01 RX ADMIN — PHENYLEPHRINE HYDROCHLORIDE 20 MCG/MIN: 10 INJECTION INTRAVENOUS at 03:31

## 2018-01-01 RX ADMIN — HEPARIN SODIUM 3000 UNITS: 1000 INJECTION, SOLUTION INTRAVENOUS; SUBCUTANEOUS at 14:15

## 2018-01-01 RX ADMIN — CHLORHEXIDINE GLUCONATE 15 ML: 1.2 RINSE ORAL at 08:36

## 2018-01-01 RX ADMIN — DEXMEDETOMIDINE HYDROCHLORIDE 0.8 MCG/KG/HR: 100 INJECTION, SOLUTION INTRAVENOUS at 21:00

## 2018-01-01 RX ADMIN — ESMOLOL HYDROCHLORIDE 50 MCG/KG/MIN: 10 INJECTION INTRAVENOUS at 09:36

## 2018-01-01 RX ADMIN — STANDARDIZED SENNA CONCENTRATE AND DOCUSATE SODIUM 2 TABLET: 8.6; 5 TABLET, FILM COATED ORAL at 20:39

## 2018-01-01 RX ADMIN — VASOPRESSIN 0.03 UNITS/MIN: 20 INJECTION INTRAVENOUS at 19:10

## 2018-01-01 RX ADMIN — METOCLOPRAMIDE 10 MG: 5 INJECTION, SOLUTION INTRAMUSCULAR; INTRAVENOUS at 18:16

## 2018-01-01 RX ADMIN — BUMETANIDE 1 MG/HR: 0.25 INJECTION, SOLUTION INTRAMUSCULAR; INTRAVENOUS at 13:34

## 2018-01-01 RX ADMIN — DEXTROSE MONOHYDRATE 25 ML: 25 INJECTION, SOLUTION INTRAVENOUS at 00:58

## 2018-01-01 RX ADMIN — METOCLOPRAMIDE 10 MG: 5 INJECTION, SOLUTION INTRAMUSCULAR; INTRAVENOUS at 23:28

## 2018-01-01 RX ADMIN — IPRATROPIUM BROMIDE AND ALBUTEROL SULFATE 3 ML: .5; 3 SOLUTION RESPIRATORY (INHALATION) at 10:17

## 2018-01-01 RX ADMIN — PHENYLEPHRINE HYDROCHLORIDE 70 MCG/MIN: 10 INJECTION INTRAVENOUS at 18:23

## 2018-01-01 RX ADMIN — TOBRAMYCIN 1 DROP: 3 SOLUTION/ DROPS OPHTHALMIC at 20:54

## 2018-01-01 RX ADMIN — VASOPRESSIN 0.03 UNITS/MIN: 20 INJECTION INTRAVENOUS at 16:45

## 2018-01-01 RX ADMIN — AMIODARONE HYDROCHLORIDE 0.5 MG/MIN: 50 INJECTION, SOLUTION INTRAVENOUS at 09:13

## 2018-01-01 RX ADMIN — TAZOBACTAM SODIUM AND PIPERACILLIN SODIUM 4.5 G: 500; 4 INJECTION, SOLUTION INTRAVENOUS at 10:25

## 2018-01-01 RX ADMIN — TAZOBACTAM SODIUM AND PIPERACILLIN SODIUM 4.5 G: 500; 4 INJECTION, SOLUTION INTRAVENOUS at 08:01

## 2018-01-01 RX ADMIN — TOBRAMYCIN 1 DROP: 3 SOLUTION/ DROPS OPHTHALMIC at 19:48

## 2018-01-01 RX ADMIN — IPRATROPIUM BROMIDE AND ALBUTEROL SULFATE 3 ML: .5; 3 SOLUTION RESPIRATORY (INHALATION) at 22:24

## 2018-01-01 RX ADMIN — IPRATROPIUM BROMIDE AND ALBUTEROL SULFATE 3 ML: .5; 3 SOLUTION RESPIRATORY (INHALATION) at 03:49

## 2018-01-01 RX ADMIN — PROPOFOL 20 MCG/KG/MIN: 10 INJECTION, EMULSION INTRAVENOUS at 16:14

## 2018-01-01 RX ADMIN — ESMOLOL HYDROCHLORIDE 40 MCG/KG/MIN: 10 INJECTION INTRAVENOUS at 22:27

## 2018-01-01 RX ADMIN — PROPOFOL 30 MCG/KG/MIN: 10 INJECTION, EMULSION INTRAVENOUS at 09:45

## 2018-01-01 RX ADMIN — NOREPINEPHRINE BITARTRATE 8 MG: 1 INJECTION INTRAVENOUS at 08:51

## 2018-01-01 RX ADMIN — PROPOFOL 20 MCG/KG/MIN: 10 INJECTION, EMULSION INTRAVENOUS at 19:46

## 2018-01-01 RX ADMIN — ALBUMIN (HUMAN) 12.5 G: 0.25 INJECTION, SOLUTION INTRAVENOUS at 12:15

## 2018-01-01 RX ADMIN — AZITHROMYCIN 250 MG: 250 TABLET, FILM COATED ORAL at 08:50

## 2018-01-01 RX ADMIN — AMPICILLIN SODIUM AND SULBACTAM SODIUM 3 G: 2; 1 INJECTION, POWDER, FOR SOLUTION INTRAMUSCULAR; INTRAVENOUS at 11:26

## 2018-01-01 RX ADMIN — HEPARIN SODIUM 1750 UNITS: 1000 INJECTION, SOLUTION INTRAVENOUS; SUBCUTANEOUS at 10:06

## 2018-01-01 RX ADMIN — DEXTROSE MONOHYDRATE 50 ML: 25 INJECTION, SOLUTION INTRAVENOUS at 12:20

## 2018-01-01 RX ADMIN — PHENYLEPHRINE HYDROCHLORIDE 200 MCG/MIN: 10 INJECTION INTRAVENOUS at 18:12

## 2018-01-01 RX ADMIN — PHENYLEPHRINE HYDROCHLORIDE 100 MCG/MIN: 10 INJECTION INTRAVENOUS at 10:26

## 2018-01-01 RX ADMIN — TOBRAMYCIN 1 DROP: 3 SOLUTION/ DROPS OPHTHALMIC at 00:41

## 2018-01-01 RX ADMIN — IPRATROPIUM BROMIDE AND ALBUTEROL SULFATE 3 ML: .5; 3 SOLUTION RESPIRATORY (INHALATION) at 06:26

## 2018-01-01 RX ADMIN — DEXMEDETOMIDINE HYDROCHLORIDE 0.9 MCG/KG/HR: 100 INJECTION, SOLUTION INTRAVENOUS at 18:49

## 2018-01-01 RX ADMIN — TAZOBACTAM SODIUM AND PIPERACILLIN SODIUM 4.5 G: 500; 4 INJECTION, SOLUTION INTRAVENOUS at 23:19

## 2018-01-01 RX ADMIN — IPRATROPIUM BROMIDE AND ALBUTEROL SULFATE 3 ML: .5; 3 SOLUTION RESPIRATORY (INHALATION) at 11:17

## 2018-01-01 RX ADMIN — DEXMEDETOMIDINE HYDROCHLORIDE 0.6 MCG/KG/HR: 100 INJECTION, SOLUTION INTRAVENOUS at 08:32

## 2018-01-01 RX ADMIN — STANDARDIZED SENNA CONCENTRATE AND DOCUSATE SODIUM 2 TABLET: 8.6; 5 TABLET, FILM COATED ORAL at 08:36

## 2018-01-01 RX ADMIN — DEXMEDETOMIDINE HYDROCHLORIDE 0.6 MCG/KG/HR: 100 INJECTION, SOLUTION INTRAVENOUS at 15:22

## 2018-01-01 RX ADMIN — AMPICILLIN SODIUM AND SULBACTAM SODIUM 3 G: 2; 1 INJECTION, POWDER, FOR SOLUTION INTRAMUSCULAR; INTRAVENOUS at 08:50

## 2018-01-01 RX ADMIN — IPRATROPIUM BROMIDE AND ALBUTEROL SULFATE 3 ML: .5; 3 SOLUTION RESPIRATORY (INHALATION) at 10:40

## 2018-01-01 RX ADMIN — CHLORHEXIDINE GLUCONATE 15 ML: 1.2 RINSE ORAL at 09:14

## 2018-01-01 RX ADMIN — AMPICILLIN SODIUM AND SULBACTAM SODIUM 3 G: 2; 1 INJECTION, POWDER, FOR SOLUTION INTRAMUSCULAR; INTRAVENOUS at 05:06

## 2018-01-01 RX ADMIN — HEPARIN SODIUM 9000 UNITS: 1000 INJECTION, SOLUTION INTRAVENOUS; SUBCUTANEOUS at 14:26

## 2018-01-01 RX ADMIN — AMPICILLIN SODIUM AND SULBACTAM SODIUM 3 G: 2; 1 INJECTION, POWDER, FOR SOLUTION INTRAMUSCULAR; INTRAVENOUS at 20:30

## 2018-01-01 RX ADMIN — DEXMEDETOMIDINE HYDROCHLORIDE 0.8 MCG/KG/HR: 100 INJECTION, SOLUTION INTRAVENOUS at 03:12

## 2018-01-01 RX ADMIN — TOBRAMYCIN 1 DROP: 3 SOLUTION/ DROPS OPHTHALMIC at 16:46

## 2018-01-01 RX ADMIN — FAMOTIDINE 20 MG: 10 INJECTION, SOLUTION INTRAVENOUS at 22:18

## 2018-01-01 RX ADMIN — PHENYLEPHRINE HYDROCHLORIDE 40000 MCG: 10 INJECTION INTRAVENOUS at 13:39

## 2018-01-01 RX ADMIN — ALBUMIN (HUMAN) 12.5 G: 0.25 INJECTION, SOLUTION INTRAVENOUS at 13:37

## 2018-01-01 RX ADMIN — HEPARIN SODIUM 1950 UNITS/HR: 5000 INJECTION, SOLUTION INTRAVENOUS at 14:26

## 2018-01-01 RX ADMIN — AMIODARONE HYDROCHLORIDE 0.5 MG/MIN: 50 INJECTION, SOLUTION INTRAVENOUS at 00:01

## 2018-01-01 RX ADMIN — DEXMEDETOMIDINE HYDROCHLORIDE 0.3 MCG/KG/HR: 100 INJECTION, SOLUTION INTRAVENOUS at 17:47

## 2018-01-01 RX ADMIN — LORAZEPAM 4 MG: 2 INJECTION INTRAMUSCULAR; INTRAVENOUS at 18:47

## 2018-01-01 RX ADMIN — ROCURONIUM BROMIDE 60 MG: 10 INJECTION, SOLUTION INTRAVENOUS at 10:33

## 2018-01-01 RX ADMIN — DEXTROSE MONOHYDRATE 50 ML: 25 INJECTION, SOLUTION INTRAVENOUS at 01:53

## 2018-01-01 RX ADMIN — AZITHROMYCIN 250 MG: 250 TABLET, FILM COATED ORAL at 10:03

## 2018-01-01 RX ADMIN — AMPICILLIN SODIUM AND SULBACTAM SODIUM 3 G: 2; 1 INJECTION, POWDER, FOR SOLUTION INTRAMUSCULAR; INTRAVENOUS at 19:41

## 2018-01-01 RX ADMIN — EPOPROSTENOL SODIUM 0.02 MCG/KG/MIN: 1.5 INJECTION, POWDER, LYOPHILIZED, FOR SOLUTION INTRAVENOUS at 16:46

## 2018-01-01 RX ADMIN — EPOPROSTENOL SODIUM 0.02 MCG/KG/MIN: 1.5 INJECTION, POWDER, LYOPHILIZED, FOR SOLUTION INTRAVENOUS at 20:39

## 2018-01-01 RX ADMIN — ENOXAPARIN SODIUM 100 MG: 100 INJECTION SUBCUTANEOUS at 13:35

## 2018-01-01 RX ADMIN — VASOPRESSIN 0.03 UNITS/MIN: 20 INJECTION INTRAVENOUS at 11:11

## 2018-01-01 RX ADMIN — HEPARIN SODIUM 5000 UNITS: 1000 INJECTION, SOLUTION INTRAVENOUS; SUBCUTANEOUS at 15:40

## 2018-01-01 RX ADMIN — CHLORHEXIDINE GLUCONATE 15 ML: 1.2 RINSE ORAL at 21:38

## 2018-01-01 RX ADMIN — VASOPRESSIN 0.03 UNITS/MIN: 20 INJECTION INTRAVENOUS at 13:00

## 2018-01-01 RX ADMIN — CHLORHEXIDINE GLUCONATE 15 ML: 1.2 RINSE ORAL at 22:18

## 2018-01-01 RX ADMIN — IPRATROPIUM BROMIDE AND ALBUTEROL SULFATE 3 ML: .5; 3 SOLUTION RESPIRATORY (INHALATION) at 03:13

## 2018-01-01 RX ADMIN — FENTANYL CITRATE 100 MCG: 50 INJECTION, SOLUTION INTRAMUSCULAR; INTRAVENOUS at 10:33

## 2018-01-01 RX ADMIN — DEXMEDETOMIDINE HYDROCHLORIDE 0.3 MCG/KG/HR: 100 INJECTION, SOLUTION INTRAVENOUS at 00:40

## 2018-01-01 RX ADMIN — SODIUM CHLORIDE: 9 INJECTION, SOLUTION INTRAVENOUS at 12:15

## 2018-01-01 RX ADMIN — DEXMEDETOMIDINE HYDROCHLORIDE 0.7 MCG/KG/HR: 100 INJECTION, SOLUTION INTRAVENOUS at 07:36

## 2018-01-01 RX ADMIN — IPRATROPIUM BROMIDE AND ALBUTEROL SULFATE 3 ML: .5; 3 SOLUTION RESPIRATORY (INHALATION) at 11:28

## 2018-01-01 RX ADMIN — EPOPROSTENOL SODIUM 0.02 MCG/KG/MIN: 1.5 INJECTION, POWDER, LYOPHILIZED, FOR SOLUTION INTRAVENOUS at 08:47

## 2018-01-01 RX ADMIN — FUROSEMIDE 80 MG: 10 INJECTION, SOLUTION INTRAMUSCULAR; INTRAVENOUS at 09:23

## 2018-01-01 RX ADMIN — TOBRAMYCIN 1 DROP: 3 SOLUTION/ DROPS OPHTHALMIC at 04:17

## 2018-01-01 RX ADMIN — METHYLPREDNISOLONE SODIUM SUCCINATE 40 MG: 40 INJECTION, POWDER, FOR SOLUTION INTRAMUSCULAR; INTRAVENOUS at 05:06

## 2018-01-01 RX ADMIN — IPRATROPIUM BROMIDE AND ALBUTEROL SULFATE 3 ML: .5; 3 SOLUTION RESPIRATORY (INHALATION) at 18:12

## 2018-01-01 RX ADMIN — CHLORHEXIDINE GLUCONATE 15 ML: 1.2 RINSE ORAL at 10:30

## 2018-01-01 RX ADMIN — IPRATROPIUM BROMIDE AND ALBUTEROL SULFATE 3 ML: .5; 3 SOLUTION RESPIRATORY (INHALATION) at 23:07

## 2018-01-01 RX ADMIN — NOREPINEPHRINE BITARTRATE 30 MCG/MIN: 1 INJECTION INTRAVENOUS at 18:27

## 2018-01-01 RX ADMIN — SODIUM BICARBONATE 50 MEQ: 84 INJECTION, SOLUTION INTRAVENOUS at 11:57

## 2018-01-01 RX ADMIN — STANDARDIZED SENNA CONCENTRATE AND DOCUSATE SODIUM 2 TABLET: 8.6; 5 TABLET, FILM COATED ORAL at 09:40

## 2018-01-01 RX ADMIN — METOCLOPRAMIDE 10 MG: 5 INJECTION, SOLUTION INTRAMUSCULAR; INTRAVENOUS at 17:29

## 2018-01-01 RX ADMIN — CHLORHEXIDINE GLUCONATE 15 ML: 1.2 RINSE ORAL at 20:50

## 2018-01-01 RX ADMIN — EPOPROSTENOL SODIUM 0.02 MCG/KG/MIN: 1.5 INJECTION, POWDER, LYOPHILIZED, FOR SOLUTION INTRAVENOUS at 04:40

## 2018-01-01 RX ADMIN — SODIUM CHLORIDE: 9 INJECTION, SOLUTION INTRAVENOUS at 12:45

## 2018-01-01 RX ADMIN — FAMOTIDINE 20 MG: 10 INJECTION, SOLUTION INTRAVENOUS at 12:00

## 2018-01-01 RX ADMIN — DEXMEDETOMIDINE HYDROCHLORIDE 0.8 MCG/KG/HR: 100 INJECTION, SOLUTION INTRAVENOUS at 13:06

## 2018-01-01 RX ADMIN — DEXMEDETOMIDINE HYDROCHLORIDE 1 MCG/KG/HR: 100 INJECTION, SOLUTION INTRAVENOUS at 15:50

## 2018-01-01 RX ADMIN — STANDARDIZED SENNA CONCENTRATE AND DOCUSATE SODIUM 2 TABLET: 8.6; 5 TABLET, FILM COATED ORAL at 20:36

## 2018-01-01 RX ADMIN — FUROSEMIDE 5 MG/HR: 10 INJECTION, SOLUTION INTRAMUSCULAR; INTRAVENOUS at 13:20

## 2018-01-01 RX ADMIN — DEXMEDETOMIDINE HYDROCHLORIDE 0.6 MCG/KG/HR: 100 INJECTION, SOLUTION INTRAVENOUS at 06:26

## 2018-01-01 RX ADMIN — AMIODARONE HYDROCHLORIDE 0.5 MG/MIN: 50 INJECTION, SOLUTION INTRAVENOUS at 13:06

## 2018-01-01 RX ADMIN — NOREPINEPHRINE BITARTRATE 25 MCG/MIN: 1 INJECTION INTRAVENOUS at 01:46

## 2018-01-01 RX ADMIN — FAMOTIDINE 20 MG: 10 INJECTION, SOLUTION INTRAVENOUS at 20:50

## 2018-01-01 RX ADMIN — HEPARIN SODIUM 5000 UNITS: 1000 INJECTION, SOLUTION INTRAVENOUS; SUBCUTANEOUS at 08:33

## 2018-01-01 RX ADMIN — STANDARDIZED SENNA CONCENTRATE AND DOCUSATE SODIUM 2 TABLET: 8.6; 5 TABLET, FILM COATED ORAL at 09:14

## 2018-01-01 RX ADMIN — DEXMEDETOMIDINE HYDROCHLORIDE 0.9 MCG/KG/HR: 100 INJECTION, SOLUTION INTRAVENOUS at 20:42

## 2018-01-01 RX ADMIN — CHLORHEXIDINE GLUCONATE 15 ML: 1.2 RINSE ORAL at 13:35

## 2018-01-01 RX ADMIN — PHENYLEPHRINE HYDROCHLORIDE 90 MCG/MIN: 10 INJECTION INTRAVENOUS at 19:46

## 2018-01-01 RX ADMIN — DEXMEDETOMIDINE HYDROCHLORIDE 0.9 MCG/KG/HR: 100 INJECTION, SOLUTION INTRAVENOUS at 00:21

## 2018-01-01 RX ADMIN — AMPICILLIN SODIUM AND SULBACTAM SODIUM 3 G: 2; 1 INJECTION, POWDER, FOR SOLUTION INTRAMUSCULAR; INTRAVENOUS at 14:40

## 2018-01-01 RX ADMIN — ESMOLOL HYDROCHLORIDE 30 MCG/KG/MIN: 10 INJECTION INTRAVENOUS at 10:35

## 2018-01-01 RX ADMIN — NITROGLYCERIN 1 INCH: 20 OINTMENT TOPICAL at 09:44

## 2018-01-01 RX ADMIN — AMIODARONE HYDROCHLORIDE 0.5 MG/MIN: 50 INJECTION, SOLUTION INTRAVENOUS at 09:41

## 2018-01-01 RX ADMIN — DEXMEDETOMIDINE HYDROCHLORIDE 0.1 MCG/KG/HR: 100 INJECTION, SOLUTION INTRAVENOUS at 11:26

## 2018-01-01 RX ADMIN — VASOPRESSIN 0.03 UNITS/MIN: 20 INJECTION INTRAVENOUS at 08:17

## 2018-01-01 RX ADMIN — FAMOTIDINE 20 MG: 20 TABLET, FILM COATED ORAL at 21:38

## 2018-01-01 RX ADMIN — SODIUM BICARBONATE 150 MEQ: 84 INJECTION, SOLUTION INTRAVENOUS at 16:46

## 2018-01-01 RX ADMIN — PHENYLEPHRINE HYDROCHLORIDE 200 MCG/MIN: 10 INJECTION INTRAVENOUS at 17:45

## 2018-01-01 RX ADMIN — IPRATROPIUM BROMIDE AND ALBUTEROL SULFATE 3 ML: .5; 3 SOLUTION RESPIRATORY (INHALATION) at 10:53

## 2018-01-01 RX ADMIN — AMPICILLIN SODIUM AND SULBACTAM SODIUM 3 G: 2; 1 INJECTION, POWDER, FOR SOLUTION INTRAMUSCULAR; INTRAVENOUS at 17:47

## 2018-01-01 RX ADMIN — IPRATROPIUM BROMIDE AND ALBUTEROL SULFATE 3 ML: .5; 3 SOLUTION RESPIRATORY (INHALATION) at 14:20

## 2018-01-01 RX ADMIN — ALBUMIN (HUMAN) 12.5 G: 0.25 INJECTION, SOLUTION INTRAVENOUS at 11:33

## 2018-01-01 RX ADMIN — IPRATROPIUM BROMIDE AND ALBUTEROL SULFATE 3 ML: .5; 3 SOLUTION RESPIRATORY (INHALATION) at 21:56

## 2018-01-01 RX ADMIN — DEXMEDETOMIDINE HYDROCHLORIDE 0.9 MCG/KG/HR: 100 INJECTION, SOLUTION INTRAVENOUS at 02:14

## 2018-01-01 RX ADMIN — NOREPINEPHRINE BITARTRATE 5 MCG/MIN: 1 INJECTION INTRAVENOUS at 12:30

## 2018-01-01 RX ADMIN — IPRATROPIUM BROMIDE AND ALBUTEROL SULFATE 3 ML: .5; 3 SOLUTION RESPIRATORY (INHALATION) at 14:10

## 2018-01-01 RX ADMIN — NOREPINEPHRINE BITARTRATE 26 MCG/MIN: 1 INJECTION INTRAVENOUS at 09:48

## 2018-01-01 RX ADMIN — TOBRAMYCIN 1 DROP: 3 SOLUTION/ DROPS OPHTHALMIC at 13:45

## 2018-01-01 RX ADMIN — DEXMEDETOMIDINE HYDROCHLORIDE 0.5 MCG/KG/HR: 100 INJECTION, SOLUTION INTRAVENOUS at 13:17

## 2018-01-01 RX ADMIN — AMIODARONE HYDROCHLORIDE 0.5 MG/MIN: 50 INJECTION, SOLUTION INTRAVENOUS at 15:51

## 2018-01-01 RX ADMIN — SODIUM BICARBONATE 150 MEQ: 84 INJECTION, SOLUTION INTRAVENOUS at 03:31

## 2018-01-01 RX ADMIN — TOBRAMYCIN 1 DROP: 3 SOLUTION/ DROPS OPHTHALMIC at 09:15

## 2018-01-01 RX ADMIN — DEXMEDETOMIDINE HYDROCHLORIDE 0.9 MCG/KG/HR: 100 INJECTION, SOLUTION INTRAVENOUS at 22:41

## 2018-01-01 RX ADMIN — DEXMEDETOMIDINE HYDROCHLORIDE 0.9 MCG/KG/HR: 100 INJECTION, SOLUTION INTRAVENOUS at 07:23

## 2018-01-01 RX ADMIN — HEPARIN SODIUM 1150 UNITS/HR: 5000 INJECTION, SOLUTION INTRAVENOUS at 19:49

## 2018-01-01 RX ADMIN — BUMETANIDE 1 MG/HR: 0.25 INJECTION, SOLUTION INTRAMUSCULAR; INTRAVENOUS at 13:25

## 2018-01-01 RX ADMIN — AMPICILLIN SODIUM AND SULBACTAM SODIUM 3 G: 2; 1 INJECTION, POWDER, FOR SOLUTION INTRAMUSCULAR; INTRAVENOUS at 04:20

## 2018-01-01 RX ADMIN — HEPARIN SODIUM 550 UNITS/HR: 5000 INJECTION, SOLUTION INTRAVENOUS at 04:35

## 2018-01-01 RX ADMIN — IPRATROPIUM BROMIDE AND ALBUTEROL SULFATE 3 ML: .5; 3 SOLUTION RESPIRATORY (INHALATION) at 06:35

## 2018-01-01 RX ADMIN — AZITHROMYCIN FOR INJECTION INJECTION, POWDER, LYOPHILIZED, FOR SOLUTION 500 MG: 500 INJECTION INTRAVENOUS at 09:45

## 2018-01-01 RX ADMIN — TOBRAMYCIN 1 DROP: 3 SOLUTION/ DROPS OPHTHALMIC at 09:16

## 2018-01-01 RX ADMIN — CHLORHEXIDINE GLUCONATE 15 ML: 1.2 RINSE ORAL at 20:35

## 2018-01-01 RX ADMIN — AMPICILLIN SODIUM AND SULBACTAM SODIUM 3 G: 2; 1 INJECTION, POWDER, FOR SOLUTION INTRAMUSCULAR; INTRAVENOUS at 18:41

## 2018-01-01 RX ADMIN — PROPOFOL 20 MCG/KG/MIN: 10 INJECTION, EMULSION INTRAVENOUS at 22:27

## 2018-01-01 RX ADMIN — NOREPINEPHRINE BITARTRATE 30 MCG/MIN: 1 INJECTION INTRAVENOUS at 13:39

## 2018-01-01 RX ADMIN — IPRATROPIUM BROMIDE AND ALBUTEROL SULFATE 3 ML: .5; 3 SOLUTION RESPIRATORY (INHALATION) at 06:25

## 2018-01-01 RX ADMIN — HEPARIN SODIUM 3000 UNITS: 1000 INJECTION, SOLUTION INTRAVENOUS; SUBCUTANEOUS at 18:55

## 2018-01-01 RX ADMIN — TOBRAMYCIN 1 DROP: 3 SOLUTION/ DROPS OPHTHALMIC at 04:20

## 2018-01-01 RX ADMIN — MORPHINE SULFATE 10 MG: 10 INJECTION INTRAVENOUS at 18:46

## 2018-01-01 RX ADMIN — SODIUM CHLORIDE 250 ML: 9 INJECTION, SOLUTION INTRAVENOUS at 09:45

## 2018-01-01 RX ADMIN — TOBRAMYCIN 1 DROP: 3 SOLUTION/ DROPS OPHTHALMIC at 10:25

## 2018-01-01 RX ADMIN — TOBRAMYCIN 1 DROP: 3 SOLUTION/ DROPS OPHTHALMIC at 03:32

## 2018-01-01 RX ADMIN — CHLORHEXIDINE GLUCONATE 15 ML: 1.2 RINSE ORAL at 20:36

## 2018-01-01 RX ADMIN — DEXMEDETOMIDINE HYDROCHLORIDE 0.5 MCG/KG/HR: 100 INJECTION, SOLUTION INTRAVENOUS at 10:10

## 2018-01-01 RX ADMIN — EPOPROSTENOL SODIUM 0.02 MCG/KG/MIN: 1.5 INJECTION, POWDER, LYOPHILIZED, FOR SOLUTION INTRAVENOUS at 12:40

## 2018-01-01 RX ADMIN — SODIUM CHLORIDE: 9 INJECTION, SOLUTION INTRAVENOUS at 11:45

## 2018-01-01 RX ADMIN — PROPOFOL 20 MCG/KG/MIN: 10 INJECTION, EMULSION INTRAVENOUS at 04:16

## 2018-01-01 RX ADMIN — IPRATROPIUM BROMIDE AND ALBUTEROL SULFATE 3 ML: .5; 3 SOLUTION RESPIRATORY (INHALATION) at 18:39

## 2018-01-01 RX ADMIN — EPOPROSTENOL SODIUM 0.02 MCG/KG/MIN: 1.5 INJECTION, POWDER, LYOPHILIZED, FOR SOLUTION INTRAVENOUS at 04:15

## 2018-01-01 RX ADMIN — IPRATROPIUM BROMIDE AND ALBUTEROL SULFATE 3 ML: .5; 3 SOLUTION RESPIRATORY (INHALATION) at 22:12

## 2018-01-01 RX ADMIN — STANDARDIZED SENNA CONCENTRATE AND DOCUSATE SODIUM 2 TABLET: 8.6; 5 TABLET, FILM COATED ORAL at 20:35

## 2018-01-01 RX ADMIN — EPOPROSTENOL SODIUM 0.02 MCG/KG/MIN: 1.5 INJECTION, POWDER, LYOPHILIZED, FOR SOLUTION INTRAVENOUS at 00:41

## 2018-01-01 RX ADMIN — HEPARIN SODIUM 3000 UNITS: 1000 INJECTION, SOLUTION INTRAVENOUS; SUBCUTANEOUS at 10:06

## 2018-01-01 RX ADMIN — DEXMEDETOMIDINE HYDROCHLORIDE 0.6 MCG/KG/HR: 100 INJECTION, SOLUTION INTRAVENOUS at 11:56

## 2018-01-01 RX ADMIN — AMIODARONE HYDROCHLORIDE 150 MG: 1.5 INJECTION, SOLUTION INTRAVENOUS at 08:48

## 2018-01-01 RX ADMIN — AMPICILLIN SODIUM AND SULBACTAM SODIUM 3 G: 2; 1 INJECTION, POWDER, FOR SOLUTION INTRAMUSCULAR; INTRAVENOUS at 13:10

## 2018-01-01 RX ADMIN — EPOPROSTENOL SODIUM 0.02 MCG/KG/MIN: 1.5 INJECTION, POWDER, LYOPHILIZED, FOR SOLUTION INTRAVENOUS at 16:30

## 2018-01-01 RX ADMIN — HEPARIN SODIUM 1950 UNITS/HR: 5000 INJECTION, SOLUTION INTRAVENOUS at 11:56

## 2018-01-01 RX ADMIN — METHYLPREDNISOLONE SODIUM SUCCINATE 40 MG: 40 INJECTION, POWDER, FOR SOLUTION INTRAMUSCULAR; INTRAVENOUS at 18:40

## 2018-01-01 RX ADMIN — DEXMEDETOMIDINE HYDROCHLORIDE 0.7 MCG/KG/HR: 100 INJECTION, SOLUTION INTRAVENOUS at 10:00

## 2018-01-01 RX ADMIN — EPOPROSTENOL SODIUM 0.02 MCG/KG/MIN: 1.5 INJECTION, POWDER, LYOPHILIZED, FOR SOLUTION INTRAVENOUS at 20:35

## 2018-01-01 RX ADMIN — PHENYLEPHRINE HYDROCHLORIDE 300 MCG/MIN: 10 INJECTION INTRAVENOUS at 08:50

## 2018-01-01 RX ADMIN — DEXMEDETOMIDINE HYDROCHLORIDE 0.5 MCG/KG/HR: 100 INJECTION, SOLUTION INTRAVENOUS at 19:46

## 2018-01-01 RX ADMIN — EPOPROSTENOL SODIUM 0.02 MCG/KG/MIN: 1.5 INJECTION, POWDER, LYOPHILIZED, FOR SOLUTION INTRAVENOUS at 00:25

## 2018-01-01 RX ADMIN — HEPARIN SODIUM 9000 UNITS: 1000 INJECTION, SOLUTION INTRAVENOUS; SUBCUTANEOUS at 12:00

## 2018-01-01 ASSESSMENT — PAIN SCALES - GENERAL: PAINLEVEL_OUTOF10: 0

## 2018-01-01 ASSESSMENT — PULMONARY FUNCTION TESTS
EPAP_CMH2O: 8
EPAP_CMH2O: 8

## 2018-02-19 PROBLEM — R60.1 ANASARCA: Status: ACTIVE | Noted: 2018-01-01

## 2018-02-19 PROBLEM — R79.89 ELEVATED LFTS: Status: ACTIVE | Noted: 2018-01-01

## 2018-02-19 PROBLEM — E66.01 MORBID OBESITY (HCC): Status: ACTIVE | Noted: 2018-01-01

## 2018-02-19 PROBLEM — J96.00 ACUTE RESPIRATORY FAILURE (HCC): Status: ACTIVE | Noted: 2018-01-01

## 2018-02-19 PROBLEM — G93.41 ACUTE METABOLIC ENCEPHALOPATHY: Status: ACTIVE | Noted: 2018-01-01

## 2018-02-19 PROBLEM — J18.9 PNA (PNEUMONIA): Status: ACTIVE | Noted: 2018-01-01

## 2018-02-19 PROBLEM — I27.81 COR PULMONALE (HCC): Status: ACTIVE | Noted: 2018-01-01

## 2018-02-19 NOTE — H&P
PRIMARY CARE PROVIDER:  Unknown.    CHIEF COMPLAINT:  Dyspnea and generalized edema.    HISTORY OF PRESENT ILLNESS:  The patient is a 47-year-old female who presented   to her local emergency room for evaluation of progressive dyspnea associated   with generalized edema.  She was noted to be severely hypoxic and was   transferred to our facility on a nonrebreather for higher level of care.  She   was noted to have atrial fibrillation with tachycardia.  She has been   noncompliant with her medications.  She was recently evaluated for URI and   possible influenza and pneumonia and did not fill her medications.  The   patient is currently on BiPAP and unable to provide any history.    REVIEW OF SYSTEMS:  As above, otherwise unable to obtain due to her medical   condition.    PAST MEDICAL HISTORY:  Significant for atrial fibrillation and recent URI.    PAST SURGICAL HISTORY:  Unknown and unable to obtain due to her medical   condition.    SOCIAL HISTORY:  Unable to obtain due to her medical condition.    CURRENT MEDICATIONS:  She has been prescribed Xarelto, Cardizem, and   furosemide, but has been noncompliant.    FAMILY HISTORY:  Unable to obtain due to her medical condition.    PHYSICAL EXAMINATION:  GENERAL:  The patient is morbidly obese.  She is on BiPAP.  She is lethargic.  VITAL SIGNS:  Temperature is 36.1, pulse is 115, respiratory rate is 12, pulse   oximetry is 92%.  HEAD AND NECK:  Pupils are equal and reactive.  Trachea is in the midline.    Neck is supple.  HEART:  She is tachycardic, irregularly irregular, normal S1, S2.  No murmurs,   rubs, or gallops.  LUNGS:  She has bibasilar rales.  No chest wall tenderness.  ABDOMEN:  Obese, soft, nontender.  Bowel sounds are positive.  No   hepatosplenomegaly.  EXTREMITIES:  3+ edema, no clubbing, no cyanosis.  NEUROLOGIC:  Patient is lethargic.  She is arousable.  No gross focal deficits   are noted.  SKIN:  No rash.    LABORATORY DATA AND DIAGNOSTIC STUDIES:   White blood cell count is 7.8,   hemoglobin 12.3, hematocrit 40.9, platelet count 264.  Sodium 136, potassium   4.0, chloride 102, bicarbonate 27, glucose 99, BUN 20, creatinine 1.05,   calcium is 9.0.  , , alkaline phosphatase 109, total bilirubin   is 1.8.  Lactic acid is 2.7.  Troponin I less than 0.01.  .  INR is   1.44.  TSH 6.33.  Chest x-ray reveals bilateral airspace opacities likely   represent pulmonary edema.  Other considerations include multifocal pneumonia,   ARDS, probably layering bilateral pleural effusions with overlying   atelectasis and consolidation, right greater than left, cardiomegaly.    EKG reveals atrial fibrillation with rapid ventricular response, no acute   ischemic changes.    ASSESSMENT:  1.  Acute respiratory failure.  2.  Possible pneumonia.  3.  Morbid obesity.  4.  Anasarca, likely secondary to cor pulmonale.  5.  Acute metabolic encephalopathy.  6.  Chronic atrial fibrillation with rapid ventricular response.  7.  Noncompliance with medical therapy.  8.  Elevated liver function tests, query secondary to congestive hepatopathy.    PLAN:  The patient will be admitted to the intensive care unit.  I spoke with   Dr. Pratt from critical care given her lethargy.  She will likely need   intubation and mechanical ventilation.    She will be started on IV Unasyn and azithromycin for community-acquired   pneumonia coverage.  We will follow up on her cultures and deescalate   antibiotics accordingly.    She will be started on esmolol drip for rate control of her atrial   fibrillation and I will also start her on heparin drip.    We will check an echocardiogram.    We will monitor her blood sugars and cover with sliding scale insulin.    We will start her on GI prophylaxis while intubated.    She will be started on a Bumex drip for her anasarca and we will follow up on   her echocardiogram.    We will check hepatitis panel and liver ultrasound.    The patient will likely  require more than 2 midnights stay for treatment of   her medical condition.    Overall prognosis is guarded.       ____________________________________     MD BISHOP PARKER / STACEY    DD:  02/19/2018 11:18:40  DT:  02/19/2018 12:23:58    D#:  [id number]  Job#:  [id number]

## 2018-02-19 NOTE — PROCEDURES
INTUBATION PROCEDURE NOTE    Date: 2/19/2018  Time: 1040am      Procedure: Intubation    Indication: Acute respiratory failure, Hypercapnic respiratory failure.   Consent: Emergency/implied    Procedure: A time-out was performed. Airway assessed and patient found to have Bipap mask, poor dentition.  Equipment prepared and RT/RN at bedside. Patient pre-oxygenated with 100% FiO2.  After medication delivery, a 4.0 Glidescope blade used to acheive direct visualization and a grade 1 view. A 8.0 ETT was placed with one attempt(s) into the trachea directly through the vocal cords. Appropriate placement confirmed by direct visualization, bilateral chest and epigastric auscultation, misting in the ETT, and ETCO2 detector. ETT secured in place at 23 cm at the teeth and patient connected to ventilator. Sedation/analgesia continued as appropriate. Patient tolerated procedure well without any difficulties and remains in care of bedside nurse and respiratory therapy. CXR will be performed to confirm appropriate placement of ETT.    Medications: 100 mcg Fentanyl, 20 mg Etomidate, 60 mg Rocuronium  Complications: None immediate  CXR: STAT PCXR reviewed, and ETT pulled back 1 cm to 22.  Placed on PCV due to elevated Peak pressures.       Reece Pratt D.O.  Critical Care Medicine

## 2018-02-19 NOTE — ED PROVIDER NOTES
"ED Provider Note    CHIEF COMPLAINT  Chief Complaint   Patient presents with   • Shortness of Breath     SOB for 2 weeks, increased edema, afib, not compliant with meds, elevated d-dimer, 80% on RA       HPI  Davina Franco is a 47 y.o. morbidly obese female who presents to the emergency department by ambulance from outside facility for higher level of care, respiratory distress. Patient reportedly diagnosed with atrial fibrillation couple of months ago, noncompliant with medications. Now with diffuse edema, worsening shortness of breath over the last couple of days. There some history for recent URI, pneumonia or influenza, patient is not currently on antibiotics. No reported fever. Patient being transferred from outside facility for hypoxia, 80% room air, now with nonrebreather, oxygen saturations remain high 80s/low 90s. . Crooks catheter placed, 80 mg of Lasix given IV.    HPI limited due to patient acuity.    REVIEW OF SYSTEMS  See HPI for further details. Limited due to patient acuity.    PAST MEDICAL HISTORY   morbidly obese. Atrial fibrillation    SOCIAL HISTORY  Social History     Social History Main Topics   • Smoking status: Not on file   • Smokeless tobacco: Not on file   • Alcohol use Not on file   • Drug use: Unknown   • Sexual activity: Not on file       SURGICAL HISTORY  patient denies any surgical history    CURRENT MEDICATIONS  Home Medications     Reviewed by Ching Freed (Pharmacy Tech) on 02/19/18 at 0940  Med List Status: Complete   Medication Last Dose Status   furosemide (LASIX) 20 MG Tab unk Active                ALLERGIES  No Known Allergies    PHYSICAL EXAM  VITAL SIGNS: /87   Pulse (!) 47   Temp 36.1 °C (96.9 °F)   Resp (!) 10   Ht 1.575 m (5' 2\")   Wt (!) 250 kg (551 lb 2.4 oz)   SpO2 (!) 66%   .81 kg/m²   Pulse ox interpretation: I interpret this pulse ox as low, requiring 15 L by non-rebreather.  Constitutional: Somnolent but arousable. Mild " distress secondary increased work of breathing. Morbidly obese.  HENT: Normocephalic, atraumatic. Bilateral external ears normal, Nose normal. Dry mucous membranes and lips.  Eyes: Pupils are equal and reactive, Conjunctiva normal. Bilateral conjunctivitis, crusting and drainage. Periorbital swelling no worse than face, anasarca, no proptosis no pain with EOMI.  Neck: Normal range of motion, Supple.  Lymphatic: No lymphadenopathy noted.   Cardiovascular: Distant heart sounds. Irregularly irregular, tachycardic. Anasarca.  Thorax & Lungs: Diminished bilaterally. Poor ventilatory effort. Tachypnea. Clear speech.  Abdomen: Obese, Soft, distended. Firm, anasarca. No tenderness to palpation.  Skin: Warm, Dry.   Musculoskeletal: Good range of motion in all major joints.  Neurologic: Alert , no gross focal deficit noted.  Psychiatric: Flat affect.      DIAGNOSTIC STUDIES / PROCEDURES    EKG  I interpreted this EKG myself.  This is a 12-lead study.  The rhythm is atrial fibrillation with rapid ventricular response with a rate of 119.  Low-voltage. There are no ST segment abnormalities diffuse T-wave flattening..  Interpretation: No ST segment elevation myocardial infarction.Atrial fibrillation with rapid ventricular response.  No previous EKGs for comparison.    LABS  Results for orders placed or performed during the hospital encounter of 02/19/18   CBC w/ Differential   Result Value Ref Range    WBC 7.8 4.8 - 10.8 K/uL    RBC 4.40 4.20 - 5.40 M/uL    Hemoglobin 12.3 12.0 - 16.0 g/dL    Hematocrit 40.9 37.0 - 47.0 %    MCV 93.0 81.4 - 97.8 fL    MCH 28.0 27.0 - 33.0 pg    MCHC 30.1 (L) 33.6 - 35.0 g/dL    RDW 50.4 (H) 35.9 - 50.0 fL    Platelet Count 264 164 - 446 K/uL    MPV 9.8 9.0 - 12.9 fL    Neutrophils-Polys 89.60 (H) 44.00 - 72.00 %    Lymphocytes 5.70 (L) 22.00 - 41.00 %    Monocytes 4.10 0.00 - 13.40 %    Eosinophils 0.10 0.00 - 6.90 %    Basophils 0.10 0.00 - 1.80 %    Immature Granulocytes 0.40 0.00 - 0.90 %     Nucleated RBC 0.80 /100 WBC    Neutrophils (Absolute) 7.01 2.00 - 7.15 K/uL    Lymphs (Absolute) 0.45 (L) 1.00 - 4.80 K/uL    Monos (Absolute) 0.32 0.00 - 0.85 K/uL    Eos (Absolute) 0.01 0.00 - 0.51 K/uL    Baso (Absolute) 0.01 0.00 - 0.12 K/uL    Immature Granulocytes (abs) 0.03 0.00 - 0.11 K/uL    NRBC (Absolute) 0.06 K/uL   Complete Metabolic Panel (CMP)   Result Value Ref Range    Sodium 136 135 - 145 mmol/L    Potassium 4.0 3.6 - 5.5 mmol/L    Chloride 102 96 - 112 mmol/L    Co2 27 20 - 33 mmol/L    Anion Gap 7.0 0.0 - 11.9    Glucose 99 65 - 99 mg/dL    Bun 20 8 - 22 mg/dL    Creatinine 1.05 0.50 - 1.40 mg/dL    Calcium 9.0 8.5 - 10.5 mg/dL    AST(SGOT) 290 (H) 12 - 45 U/L    ALT(SGPT) 404 (H) 2 - 50 U/L    Alkaline Phosphatase 109 (H) 30 - 99 U/L    Total Bilirubin 1.8 (H) 0.1 - 1.5 mg/dL    Albumin 3.8 3.2 - 4.9 g/dL    Total Protein 7.1 6.0 - 8.2 g/dL    Globulin 3.3 1.9 - 3.5 g/dL    A-G Ratio 1.2 g/dL   Btype Natriuretic Peptide   Result Value Ref Range    B Natriuretic Peptide 176 (H) 0 - 100 pg/mL   Troponin STAT   Result Value Ref Range    Troponin I <0.01 0.00 - 0.04 ng/mL   LACTIC ACID   Result Value Ref Range    Lactic Acid 2.7 (H) 0.5 - 2.0 mmol/L   PT/INR   Result Value Ref Range    PT 17.2 (H) 12.0 - 14.6 sec    INR 1.44 (H) 0.87 - 1.13   APTT   Result Value Ref Range    APTT 25.2 24.7 - 36.0 sec   ESTIMATED GFR   Result Value Ref Range    GFR If African American >60 >60 mL/min/1.73 m 2    GFR If Non  56 (A) >60 mL/min/1.73 m 2   EKG (NOW)   Result Value Ref Range    Report       [facility]    Test Date:  2018  Pt Name:    BARBRA RONDON              Department: ER  MRN:        [id number]                      Room:        10  Gender:     Female                       Technician: [id number]  :        [id number]                   Requested By:BEL TEJEDA  Order #:    [id number]                    Reading MD:    Measurements  Intervals                                 Axis  Rate:       119                          P:  NM:                                      QRS:        86  QRSD:       84                           T:          37  QT:         287  QTc:        404    Interpretive Statements  ATRIAL FIBRILLATION  MULTIFORM VENTRICULAR PREMATURE COMPLEXES  LOW VOLTAGE THROUGHOUT  CONSIDER ANTEROSEPTAL INFARCT  BORDERLINE T ABNORMALITIES, ANTERIOR LEADS  No previous ECG available for comparison         RADIOLOGY  DX-CHEST-PORTABLE (1 VIEW)   Final Result      Bilateral airspace opacities likely represent pulmonary edema. Other considerations include multifocal pneumonia, ARDS.      Probable layering bilateral pleural effusions with overlying atelectasis/consolidation, right greater than left.      Cardiomegaly.         DX-CHEST-PORTABLE (1 VIEW)    (Results Pending)   Echocardiogram Comp W/O Cont    (Results Pending)   US-LIVER AND BILIARY TREE    (Results Pending)       COURSE & MEDICAL DECISION MAKING  Nursing notes and vital signs were reviewed. (See chart for details)  The patients records were reviewed, history was obtained from the patient;     ED evaluation most consistent with acute respiratory failure secondary to pulmonary edema, suspect congestive heart failure secondary to uncontrolled atrial fibrillation with rapid ventricular response. BiPAP applied on arrival for persistent hypoxia and increased work of breathing. Tachycardia, Cardizem drip ordered, this is unavailable, therefore after discussion with pharmacy I ordered esmolol infusion. Patient received 80 mg of Lasix IV prior to arrival, 700 mL of urine output by Crooks catheter at this time. EKG. Low-voltage but consistent with atrial fibrillation, no evidence for ischemia or STEMI. Chest x-ray confirms diffuse pulmonary edema, bilateral pleural effusions. Airspace opacities likely secondary to the edema, however there is some history for recent URI or influenza, lactate slightly elevated as  well, patient has been covered with antibiotics for community acquired pneumonia. Obviously will hold any additional IV fluid due to severe respiratory distress secondary to pulmonary edema. Repeat labs pending. ABG to be done 30 minutes after BiPAP initiated.    9:22 AM Dr. Schroeder, pulmonology, as were the patient agreeable consultation.  is where the patient agreeable to admission. Both were at bedside to evaluate patient. Okay with esmolol as planned above for rate control given shortage of Cardizem. Pulmonology requests Bumex at this time as well.    1035 - ABG after 1 hour BiPAP pH 7.34, PCO2 54, PaO2 153, however patient becoming more and more somnolent. Decision was made at this time to intubate patient. Please refer to intensivist note for details of procedure note. Transfer to intensive care unit, care assumed by intensivist, hospitalist at this time.    The total critical care time on this patient is 40 minutes, continuous hemodynamic monitoring multiple bedside evaluations, resuscitating patient and assessing response to treatment, speaking with admitting and consulting physician, deciphering test results and arrange an ICU admission. This 40 minutes is exclusive of separately billable procedures.    FINAL IMPRESSION  (J96.00) Acute respiratory failure, unspecified whether with hypoxia or hypercapnia (CMS-HCC)  (primary encounter diagnosis)  (I50.9) Acute congestive heart failure, unspecified congestive heart failure type (CMS-HCC)  (I48.91) Atrial fibrillation with rapid ventricular response (CMS-HCC)  (H10.33) Acute bacterial conjunctivitis of both eyes      Electronically signed by: Kristie Caldera, 2/19/2018 9:20 AM      This dictation was created using voice recognition software. The accuracy of the dictation is limited to the abilities of the software. I expect there may be some errors of grammar and possibly content. The nursing notes were reviewed and certain aspects of this  information were incorporated into this note.

## 2018-02-19 NOTE — PROCEDURES
Procedure Note    Date: 2/19/2018  Time: 12:30    Procedure: Arterial Line placement  Site: left radial artery    Indication: Shock requiring continuous BP monitoring, frequent ABG monitoring  Consent: Informed consent obtained from patient or designated decision maker after explaining the benefits/risks of the procedure including but not limited to bleeding, infection, nerve or other deep structure injury, or failure of placement. Patient or surrogate expressed understanding and agreement and signed consent which can be found in the patient's chart.    Procedure: After obtaining consent, a time-out was performed. An Darryl's test was performed to test for adequate collateral circulation. Patient positioned, prepped, and draped in sterile fashion.  2 mL of local anesthetic injected (1% lidocaine without epinephrine) achieving appropriate comfort level for patient. Artery was located using realtime US. A 20 gauge catheter was placed using Seldinger technique. Appropriate arterial blood visualized from the catheter and the arterial waveform confirmed on the monitor. Line secured and dressed in place. Patient tolerated procedure well without any difficulties and left in care of bedside nurse.     EBL: minimal  Complications: none    Olivia Jimenes MD  Pulmonary and Critical Care Medicine

## 2018-02-19 NOTE — CONSULTS
DATE OF SERVICE:  02/19/2018    The patient was seen in [facility] in the ER in approximately 10:15 a.m.  The   patient was a transfer from [facility].    CHIEF COMPLAINT:  Shortness of breath.    HISTORY OF PRESENT ILLNESS:  This is a morbidly obese 47-year-old white female   who was transferred by [facility] after having shortness of breath associated   with cough within the last few days.  She was also diagnosed with a flu and   did not fill her prescriptions.  She says that she was too sick to take her   medication.  Onset of her shortness of breath was approximately 2 weeks ago.    It seems like her symptoms were constant and moderate at present.  She was   without any relieving factors.  She was not taking other medications for the   last 2 weeks with her shortness of breath, swelling in her face and eyes and   lips as well as edema and anasarca to her legs, abdomen, chest, breasts and   flank.  The diagnosis of atrial fibrillation was approximately 1 month ago and   she has not been taking verapamil nor Xarelto.    Upon arrival from the [facility] location, the patient was noted to be in   respiratory failure with initial pH at their institution at 0452 hours this   morning of 7.28, pCO2 of 55, paO2 of 23, although this was a venous gas with a   saturation of 34%.  She also had an elevated D-dimer.  At that time, EKG   showed no obvious ST or T-wave changes according to the ER note, but did show   atrial fibrillation.  The patient was placed on BiPAP and transfer was   arranged to [facility].  The patient also had elevated LFTs with AST of 435, ALT   of 501, BNP of 145 and a lactate was 2.6.  Her x-ray was very difficult to   interpret due to her morbid obesity, was felt to have bibasilar infiltrates   with consolidation.  She was placed on a nonrebreather mask initially and she   was sent by ground as opposed to flight.  The patient upon arrival to our ER   was found to have a significant weight of 250 kilos and other labs that  came   back showed a white count of 9.4, hemoglobin was 12.6.  She had no obvious   acute renal insufficiency at that time.  Initial troponin was negligible.  She   was supposed to be on verapamil orally, but did not receive.    It appears the patient did receive a dose of steroids with her 80 of Lasix, 20   of Pepcid, and 1 dose of subQ epinephrine was also administered for possible   allergic reaction in addition to diphenhydramine.    Since transfer to our institution, the patient has remained on BiPAP, seen by   Dr. Hui.  Additional 12/8 BiPAP settings were instituted and 60% oxygen.    Arterial blood gas was performed, which showed a pCO2 of only in the 50 with   pH of 7.35, paO2 was low.  However, because of the patient's increased   somnolence that developed despite sternal rub and her morbid obesity, it was   felt that the patient should be intubated for airway protection and also to   improve on her ventilation.  She was placed on full ventilator support with   pressure control ventilation at 30/5 with a followup arterial blood gas   ordered.  She has peripheral IVs currently, is receiving prior to that esmolol   for atrial fibrillation with RVR with hypertension and nitro paste was also   placed.  After intubation, ET tube was pulled back only 1 cm to 22 and she   does continue to have bilateral pulmonary infiltrates.    Antibiotics including Unasyn have been prescribed as well as azithromycin.    Esmolol is temporarily on hold while she was being intubated in case of drop   in blood pressure.      Critical care was asked to admit this critically ill   patient.    ALLERGIES:  Unknown.    MEDICATIONS:  Lasix, Xarelto, Verapamil    PAST MEDICAL HISTORY:  Recent one month diagnosis of atrial fibrillation with   RVR, morbid obesity, diabetes, and hypertension.  Her other past history is   limited at this time, but based on previous records, the patient has no other   significant medical history at our  institution, except for recent atrial   fibrillation.  Morbid obesity.    PAST SURGICAL HISTORY:  Unknown.    SOCIAL HISTORY:  Unclear if the patient is a smoker, alcohol or substance   abuses.    REVIEW OF SYSTEMS:  Unobtainable due to her past medical history and inability   to give history.    PHYSICAL EXAMINATION:  GENERAL:  The patient was arousable, open her eyes to loud voice, but sternal   rub was also required eventually.  VITAL SIGNS:  Her initial blood pressure 133/87 with a pulse of 47, which was   then in the 104, respirations were 10, with 250 kilos, saturation 66% pulse   ox.  She had slow respiratory rate.  HEENT:  Her pupils are equal and reactive to light.  There is no obvious   icterus.  She has some mild conjunctival erythema.  There was also some mild   conjunctival drainage noted.  There were normal mucous membranes, but the lips   were dry.  NECK:  Showed no obvious nuchal rigidity, although difficult to examine due to   her size.  She had no adenopathy.  CARDIOVASCULAR:  The heart sounds were distant, irregularly irregular with   significant anasarca including her chest, abdomen.  LUNGS:  Revealed diminished breath sounds bilaterally.  Very difficult to   auscultate due to her size.  There was no gross wheezing.  There were   crackles.  ABDOMEN:  Morbidly obese.  Again, anasarca was noted in the flanks.  NEUROLOGIC:  She was moving her limbs to painful stimulus, but very difficult   to move due to her increased anasarca and her mental status change.  She had   diminished reflexes at patellar, Achilles and at the plantars.  She did   withdraw to pain; however.  She opened her eyes to sternal rub.  SKIN:  Significant erythema of her legs below the knee consistent with fluid   overload and probable underlying chronic edema.    LABORATORY DATA:  White cell count 7.8, H and H is 12.3 and 40.9 with a   platelet count currently of 264.  Her .  Troponin is less than 0.01.    Lactate 2.7.  PT  17.2 with INR of 1.44, PTT 25.2.  She had a chest x-ray,   again bilateral airspace opacities with pulmonary edema most likely, although   very difficult to ascertain due to the patient's size.  Cardiomegaly is noted.  This is my interpretation.    The patient has EKG that is available from the other institution, which I do   not have personally with me, but according to dictation shows no obvious   signs of ischemia or ST elevation MI.  There is low voltage.  There is atrial   fibrillation with RVR.  [place] was unable to be determined while there was no   significant QTc prolongation.  This is my interpretation.     Arterial blood gas checked while on 12/8 and 60% oxygen showed pH of 7.15,   pCO2 greater than 100, pCO2 of 389, bicarbonate is unable to be obtained.    DIAGNOSTIC DATA:  Chest x-ray after intubation showed the ET tube in good   position, although we did pull it back 1 cm since it was near the ana maria by 2   cm.    IMPRESSION:  1.  Acute hypercapnic respiratory failure.  The patient recently has had   influenza A.  2.  Acute pneumonia.  Aspiration is a possibility.  3.  Atrial fibrillation with rapid ventricular response.  I suspect a degree   of cardiac dysfunction.  4.  Bacterial conjunctivitis of eyes.  5.  Massive fluid overload with probable right heart failure.  The patient has   anasarca.  6.  Limited medical history.    PLAN:  1.  The patient to be intubated while in the ER.  See my separate procedure  Note.  The patient was unable to   pull off the BiPAP mask and was felt to be unsafe to do so.  2.  Would try pressure control ventilation at this time due to the high peak   pressures anticipated.  3.  Continue with diuresis.  She has received both Lasix and additional Bumex   IV.  Would start a Bumex infusion and adjust.  4.  Follow BMP q. 6 hours for now.  5.  Follow LFTs as they are elevated.  6.  Cultures have been ordered.  7.  Unasyn and azithromycin for antibiotics.  8.  Echocardiogram if  able to perform due to her obesity.  9.  Bronchodilator therapy with inhaled steroids as necessary.  10.  IV steroids also may be beneficial.  11.  Drops to her eyes are planned with tobramycin ophthalmic for now q. 4   hours.  We will watch kidney function.  We would follow cardiac markers as   well.  12.  Consider anticoagulation.  13.  Esmolol to be restarted if the patient continues to need blood pressure   management.  14.  At this point, would hold off on full dose anticoagulation, although this   can be considered depending that if she converts to sinus rhythm.    The patient remains critically ill.      She has high risk of complications.      Critical care time in this patient is 65 minutes not including procedures nor overlap  time.       ____________________________________     Reece Pratt, DO [facility] / N[facility]    DD:  02/19/2018 11:06:53  DT:  02/19/2018 12:08:35    D#:  [id number]  Job#:  [id number]

## 2018-02-19 NOTE — ED NOTES
Med rec complete per calling [facility], [facility], [facility] to check if she was on file    Only medication filled was Lasix for a 90 day supply and a Xarelto starter pack on 12-29-17

## 2018-02-19 NOTE — PROCEDURES
Procedure Note    Date: 2/19/2018  Time: 12:00    Procedure: Central Venous Line placement  Site: right IJ vein    Indication: acute hypoxic respiratory failure, frequent lab draws  Consent: Informed consent obtained from patient or designated decision maker after explaining the benefits/risks of the procedure including but not limited to bleeding, infection, nerve or other deep structure injury, pneumothorax/hemothorax, arrythmia, or death. Patient or surrogate expressed understanding and agreement and signed consent which can be found in the patient's chart.    Procedure: After obtaining consent, a time-out was performed. Appropriate site confirmed with ultrasound and patient positioned, prepped, and draped in sterile fashion. All those present wearing cap and mask and those physically participating remained adhering to sterile fashion with cap, mask, gloves, and gown. 5 mL of local anesthetic injected (1% lidocaine without epinephrine) achieving appropriate comfort level for patient. Vein localized and accessed using continuous ultrasound guidance and a 7 Fr triple lumen catheter placed using Seldinger technique. Able to aspirate dark, non-pulsatile blood through each lumen and sterile saline flushed easily before capping. Line secured and dressed in sterile fashion. Patient tolerated procedure well without any difficulties and remains in care of bedside nurse. CXR will be performed to confirm appropriate placement for internal jugular or subclavian CVLs.    EBL: minimal  Complications: none  CXR: CVC over SVC, no PTX    Olivia Jimenes MD  Pulmonary and Critical Care Medicine

## 2018-02-20 PROBLEM — I42.9 CARDIOMYOPATHY (HCC): Status: ACTIVE | Noted: 2018-01-01

## 2018-02-20 PROBLEM — R57.9 SHOCK (HCC): Status: ACTIVE | Noted: 2018-01-01

## 2018-02-20 PROBLEM — A41.9 SEPSIS (HCC): Status: ACTIVE | Noted: 2018-01-01

## 2018-02-20 PROBLEM — I48.20 CHRONIC ATRIAL FIBRILLATION (HCC): Status: ACTIVE | Noted: 2018-01-01

## 2018-02-20 PROBLEM — E87.20 METABOLIC ACIDOSIS: Status: ACTIVE | Noted: 2018-01-01

## 2018-02-20 NOTE — DISCHARGE PLANNING
Medical Social Work    Referral: MDT rounds    Intervention: Attended MDT rounds, pt is a 47 y.o. Woman admitted for progressive dyspnea, edema, was hypoxic and transferred to [facility] [place] higher LOC. Pt is currently intubated (day #2 on vent), has cortrack for tube feedings and sedated. Pt's NOK is  [place] #[phone] and dtr Gabbie #[phone].     Plan: As above, pt remains in CICU on ventilator and will continue to be medically treated and managed. D/C needs undetermined at this time. SS to remain available as needed and requested.

## 2018-02-20 NOTE — ASSESSMENT & PLAN NOTE
With liver failure likely secondary to sepsis and hepatic congestion   US consistent with steatosis , hepatitis serology was negative   Monitor LFT's   Continue rifaximin  Start lactulose when ileus improved

## 2018-02-20 NOTE — ASSESSMENT & PLAN NOTE
Heparin drip for Afib and also possible PE given RV strain on echo   patient unable to tolerate CT of chest due to renal failure and critical illness  Continue flolan  Discussed with Dr. Jimenes

## 2018-02-20 NOTE — PROGRESS NOTES
12 hour chart check    [facility]  .16/.08/.36

## 2018-02-20 NOTE — PROGRESS NOTES
At beginning of shift, I removed chase catheter from pt. The chase had to be removed because it was from an outside facility. The chase bag contained 50cc of urine at the time. 7-10 nurses assisted in the attempt to place new chase catheters, all were unsuccessful. I updated the physician and ordered a bladder scan. The bladder scan resulted in 88cc

## 2018-02-20 NOTE — ASSESSMENT & PLAN NOTE
Likely cardiogenic andseptic  On Drake-Synephrine vasopressin   Wean off as tolerated   cortisol level 25.2

## 2018-02-20 NOTE — ASSESSMENT & PLAN NOTE
Vent management per critical care discussed with Dr Jimenes continue Flolan  Still with high vent support requirement

## 2018-02-20 NOTE — CONSULTS
Reason of Consult: AFRVR, Biventricular heart failure    Consulting Physician: Dr. Hollie Agrawal    HPI:  47F with extreme obesity (BMI 92), atrial fibrillation, biventricular systolic heart failure, and medical noncompliance admitted for dyspnea and hypoxemia in the setting of recent influenza pneumonia. She has been intubated and is sedated, unable to participate in the interview. Noted to have AFRVR with HRs now improving on therapy. Subsequently she has remained hypoxic and hypercarbic with new acute renal failure and mixed metabolic and respiratory acidosis and shock on high dose vasopressors including norepinephrine, vasopressin and phenylephrine.     Past Medical History:   Diagnosis Date   • Heart failure (CMS-[facility])    • Obesity        Social History     Social History   • Marital status:      Spouse name: N/A   • Number of children: N/A   • Years of education: N/A     Occupational History   • Not on file.     Social History Main Topics   • Smoking status: Not on file   • Smokeless tobacco: Not on file   • Alcohol use Not on file   • Drug use: Unknown   • Sexual activity: Not on file     Other Topics Concern   • Not on file     Social History Narrative   • No narrative on file       No current facility-administered medications on file prior to encounter.      No current outpatient prescriptions on file prior to encounter.       Current Facility-Administered Medications   Medication Dose Frequency Provider Last Rate Last Dose   • vasopressin (VASOSTRICT) 20 Units in  mL Infusion  0.03 Units/min Continuous Narinder Encarnacion M.D. 9 mL/hr at 02/20/18 0817 0.03 Units/min at 02/20/18 0817   • phenylephrine (NAY-SYNEPHRINE) 80,000 mcg in  mL Infusion  0-300 mcg/min Continuous Olivia Jimenes M.D. 93.8 mL/hr at 02/20/18 0947 250 mcg/min at 02/20/18 0947   • NOREPINEPHRINE BITARTRATE 1 MG/ML IV SOLN       Stopped at 02/20/18 0830   • RENOWN COLD NORMAL SALINE (FOR CODE CHILL)           •  amiodarone (CORDARONE) 450 mg in D5W 250 mL Infusion  0.5 mg/min Continuous Olivia Jimenes M.D. 17 mL/hr at 02/20/18 0941 0.5 mg/min at 02/20/18 0941   • dexmedetomidine (PRECEDEX) 400 mcg in D5W 100 mL infusion  0.1-1.5 mcg/kg/hr Continuous Olivia Jimenes M.D. 18.8 mL/hr at 02/20/18 1257 0.3 mcg/kg/hr at 02/20/18 1257   • norepinephrine (LEVOPHED) 8 mg in  mL Infusion  0-30 mcg/min Continuous Olivia Jimenes M.D. 56.3 mL/hr at 02/20/18 1339 30 mcg/min at 02/20/18 1339   • MD ALERT...HEPARIN WEIGHT BASED PROTOCOL Pharmacist to implement   PRN Richie Agrawal M.D.       • heparin injection 5,000 Units  5,000 Units PRN Richie Agrawal M.D.        And   • heparin infusion 25,000 units in 500 ml 0.45% nacl   Continuous Richie Agrawal M.D. 39 mL/hr at 02/20/18 1156 1,950 Units/hr at 02/20/18 1156   • sodium bicarbonate 8.4 % 150 mEq in D5W 1,000 mL infusion  150 mEq Continuous Richie Agrawal M.D. 100 mL/hr at 02/20/18 1339 150 mEq at 02/20/18 1339   • MD ALERT...Adult ICU Electrolyte Replacement per Pharmacy Protocol   pharmacy to dose Reece Pratt D.O.       • fentaNYL (SUBLIMAZE) injection 25 mcg  25 mcg Q HOUR PRN Reece Pratt D.O.        Or   • fentaNYL (SUBLIMAZE) injection 50 mcg  50 mcg Q HOUR PRN Reece Pratt D.O.        Or   • fentaNYL (SUBLIMAZE) injection 100 mcg  100 mcg Q HOUR PRN Reece Pratt D.O.       • ipratropium-albuterol (DUONEB) nebulizer solution 3 mL  3 mL Q2HRS PRN (RT) Reece Pratt D.O.       • ipratropium-albuterol (DUONEB) nebulizer solution 3 mL  3 mL Q4HRS (RT) TRUDI Ramirez.O.   3 mL at 02/20/18 1128   • famotidine (PEPCID) tablet 20 mg  20 mg Q12HRS TRUDI Ramirez.O.        Or   • famotidine (PEPCID) injection 20 mg  20 mg Q12HRS TRUDI Ramirez.O.   20 mg at 02/20/18 1003   • insulin regular (HUMULIN R) injection 3-14 Units  3-14 Units Q6HRS TRUDI Ramirez.O.   Stopped at 02/19/18 1200    And   • glucose 4 g chewable  tablet 16 g  16 g Q15 MIN PRN Reece Pratt D.O.        And   • dextrose 50% (D50W) injection 25 mL  25 mL Q15 MIN PRN Reece Pratt D.O.       • senna-docusate (PERICOLACE or SENOKOT S) 8.6-50 MG per tablet 2 Tab  2 Tab BID Richie Agrawal M.D.   Stopped at 02/19/18 1100    And   • polyethylene glycol/lytes (MIRALAX) PACKET 1 Packet  1 Packet QDAY PRN Richie Agrawal M.D.        And   • magnesium hydroxide (MILK OF MAGNESIA) suspension 30 mL  30 mL QDAY PRN Richie Agrawal M.D.        And   • bisacodyl (DULCOLAX) suppository 10 mg  10 mg QDAY PRN Richie Agrawal M.D.       • acetaminophen (TYLENOL) tablet 650 mg  650 mg Q6HRS PRN Richie Agrawal M.D.       • NS (BOLUS) infusion 500 mL  500 mL Once PRN Richie Agrawal M.D.       • ampicillin/sulbactam (UNASYN) 3 g in  mL IVPB  3 g Q6HRS Richie Agrawal M.D.   Stopped at 02/20/18 1200   • azithromycin (ZITHROMAX) tablet 250 mg  250 mg Q24HRS Richie Agrawal M.D.   250 mg at 02/20/18 1003   • ondansetron (ZOFRAN) syringe/vial injection 4 mg  4 mg Q4HRS PRN Richie Agrawal M.D.       • ondansetron (ZOFRAN ODT) dispertab 4 mg  4 mg Q4HRS PRN Richie Agrawal M.D.       • promethazine (PHENERGAN) tablet 12.5-25 mg  12.5-25 mg Q4HRS PRN Richie Agrawal M.D.       • promethazine (PHENERGAN) suppository 12.5-25 mg  12.5-25 mg Q4HRS PRN Richei Agrawal M.D.       • prochlorperazine (COMPAZINE) injection 5-10 mg  5-10 mg Q4HRS PRN Richie Agrawal M.D.       • propofol (DIPRIVAN) injection  0-80 mcg/kg/min Continuous TRUDI Ramirez.O.   Stopped at 02/20/18 0825   • chlorhexidine (PERIDEX) 0.12 % solution 15 mL  15 mL BID TRUDI Ramirez.O.   15 mL at 02/20/18 1003   • lidocaine (XYLOCAINE) 1%  injection  1-2 mL Q30 MIN PRN TRUDI Ramirez.O.       • tobramycin (TOBREX) 0.3 % ophthalmic solution 1 Drop  1 Drop Q4HRS TRUDI Ramirez.O.   1 Drop at 02/20/18 1025   • Respiratory Care per  "Protocol   Continuous RT Richie Agrawal M.D.         Last reviewed on 2/19/2018  3:59 PM by Ryan Correa R.N.    Patient has no known allergies.    No family history on file.    ROS: Unable to obtain, patient intubated and sedated unable to communicate effectively.      Physical Exam   Blood pressure 133/87, pulse 81, temperature 36.9 °C (98.4 °F), resp. rate (!) 21, height 1.651 m (5' 5\"), weight (!) 250 kg (551 lb 2.4 oz), SpO2 (!) 87 %.    Constitutional: Extreme obesity. Appears well-developed.   HENT: Normocephalic and atraumatic. No scleral icterus.   Neck: Unable to assess JVD due to body habitus.  Cardiovascular: Tachycardic and irregular, distant heart sounds.rate. Exam reveals no gallop and no friction rub. No murmur heard.   Pulmonary/Chest: Coarse   Abdominal: S/NT/ND BS -   Musculoskeletal:  Pulses present. No atrophy. Unable to assess strength.  Extremities: Exhibits 4+ BLE edema. No clubbing or cyanosis.   Skin: Skin is cool and dry.   Neuro: Non-focal, CN 2-12 unable to assess      Intake/Output Summary (Last 24 hours) at 02/20/18 1345  Last data filed at 02/20/18 1000   Gross per 24 hour   Intake          3136.64 ml   Output             1225 ml   Net          1911.64 ml       Recent Labs      02/19/18   0855  02/20/18   0411   WBC  7.8  13.2*   RBC  4.40  4.91   HEMOGLOBIN  12.3  13.5   HEMATOCRIT  40.9  48.2*   MCV  93.0  98.2*   MCH  28.0  27.5   MCHC  30.1*  28.0*   RDW  50.4*  54.5*   PLATELETCT  264  286   MPV  9.8  10.5     Recent Labs      02/19/18   0855  02/19/18   1525  02/20/18   0411   SODIUM  136  138  135   POTASSIUM  4.0  4.5  5.3   CHLORIDE  102  106  102   CO2  27  22  29   GLUCOSE  99  126*  106*   BUN  20  22  27*   CREATININE  1.05  1.30  1.86*   CALCIUM  9.0  8.7  8.7     Recent Labs      02/19/18   0855  02/20/18   0411  02/20/18   1015   APTT  25.2   --   34.8   INR  1.44*  1.64*   --      Recent Labs      02/19/18   0855   BNPBTYPENAT  176*     Recent Labs      " 02/19/18   0855  02/20/18   0411   TROPONINI  <0.01  0.03   BNPBTYPENAT  176*   --      Recent Labs      02/19/18   1525   TRIGLYCERIDE  106       EKG (2/19/2018):  I have personally reviewed the EKG this visit and discussed with the patient. It shows , low voltage.    ECHO CONCLUSIONS (2/19/2018):  No prior study is available for comparison.   Poor visualization of all cardiac structures due to body habitus   despite contrast use.  Severely reduced left ventricular systolic function by gross   evaluation.  Flattened septum in systole and diastole consistent with RV pressure   and volume overload.  Severely dilated right ventricle.  Reduced right ventricular systolic function.  Valves not seen.    Imaging reviewed    Impressions:  1. Hypoxic and hypercarbic respiratory failure  2. Mixed cardiogenic and possibly obstructive shock  3. Lactic acidosis  4. Extreme obesity (BMI 92)  5. Severe biventricular systolic dysfunction  6. Acutely decompensated congestive heart failure, CVP 36  7. JAYSON, worsening  8. Possible pulmonary embolism  9. Systemic anticoagulation with heparin  10. Possible aspiration pneumonia  11. Atrial fibrillation  12. Incomplete database  13. Documented medical noncompliance  14. Hepatocellular injury  15. MultiOrgan system failure    Recommendations:  Agree with empiric therapy for possible acute pulmonary embolism and mixed obstructive and cardiogenic shock as well as possible aspiration pneumonia.     1. IV diuresis aggressively as tolerated  2. Continue vasopressor and inotropic support  3. IV heparin gtt ongoing   4. No BB, ACEI due to renal and hemodynamic compromise.  5. HR acceptable for degree of hemodynamic impairment. Amiodarone therapy suboptimal due to long duration of AF and no prior anticoagulation, however not able to tolerate other rate modifying mediations at this time.  6. Should HRs increase, recommend judicious digoxin administration with levels given JAYSON.    Unfortunately  not a candidate for advanced heart failure therapies such as percutaneous LV support due both to medical noncompliance (not a candidate for implantable LVAD as DT or bridge for this reason) and also being far over weight capacity for any catheterization/flouroscopic equipment available.    Prognosis is extremely grim. Discussed with referring physician.      Thank you for this interesting consultation. It was my pleasure to see Davina Franco today.    Josh Montano MD, FACC, [facility]  Division of Interventional Cardiology  [facility]

## 2018-02-20 NOTE — ASSESSMENT & PLAN NOTE
Sputum Cultures negative   Blood culture 1 out of 2 coag-negative staph likely contaminant and 1/2 GNB at day 5  Continue Zosyn and follow up on final culture results and sensitivities

## 2018-02-20 NOTE — PROGRESS NOTES
Proceeded to ED to get report and take custody of the pt. She was obtunded and would not respond to loud voice and touch. She did reicite her name and  after noxious stimuli was applied via a firm sternal rub. However, she quickly became unresponsive. An ABG was drawn and the pt was subsequently intubated and placed on a ventilator support.  Per the ED physician. A propofol infusion was started at 30 mcg/kg/min and Esmolol at 50 mcg/kg/min was already infusing.  The pt

## 2018-02-20 NOTE — PROGRESS NOTES
( at approx 0930 hrs.)The  of the pt called, stating he was trying to get a ride and would be by late in the morning or early after noon.  I relayed this to Mirtha Jimenes and Hollie Agrawal. Where upon they asked to be notified when he arrived.   On or about 1500hrs Osito Acosta (huusband) and Mark Franco (son) arrived in the unit. Dr Hollie Gallego briefed them on the pt's poor prognosis, and answered their questions. I gave him, Dr Hollie Gallego, the daughter's phone number, Gabbie Liang. The physician is to call her shortly.   The  took possession of the pt's purse, phone, shirt and rings. I informed him the brother had the pt's medications she had as she arrived here at the facility. He indicated he would see the brother and retrieve the medications.

## 2018-02-20 NOTE — DIETARY
Nutrition services:  Nutrition Support Assessment     Day 1 of admit.  48 yo female with admitting diagnosis: Acute respiratory failure.     Current problem list:  1. Acute respiratory failure.  2. Morbid Obesity.  3. PNA (pneumonia).  4. Cor pulmonale.  5. Anasarca.  6. Acute metabolic encephalopathy.  7. Elevated LFTs.     Assessment:  Estimated Nutritional Needs: based on: height 165.1 cm, weight 250 kg, IBW 56.7 kg, BMI 91.72.    Calculation/Equation: MSJ x 1.0 = 3130 kcals/day (65-70% = 2035 - 2191 kcals/day); RMR per [facility] (vent L/min 6.4, Tmax/24 hrs 36.1) = 3020 kcals/day (65-70% = 1963 - 2114 kcals/day)  Calories/day:  1247 - 1418 (Calories / kg IBW 22 - 25)   Protein/day:  142+ (Grams protein / kg IBW 2.5+)     Evaluation:   1. Pt admitted for acute respiratory failure.  2. Pt intubated and sedated.  3. Central and arterial lines placed.  4. Tube feeding consult received.  5. Awaiting cortrak placement and verification.  6. Propofol currently running @ 25 mcg/min (30 mL/hr), providing 792 kcals/day; RD will monitor propofol and adjust tube feedings as needed.  7. Hypo-caloric tube feedings appropriate in morbidly obese pt with BMI >30, however unable to hypo-calorically feed pt d/t rate of propofol and pt's estimated protein needs.   8. Consult received for met cart study; RD will order.     Recommendations/Plan:  1. Once Cortrak placed and verified, start Peptamen Intense @ 25 mL/hr and advance per protocol to 65 mL/hr (goal rate with and without propofol), which provides 1560 kcals (+ kcals from propofol), 144 grams of protein and 1310 mL of free water per day.  2. Fluids per MD.  3. Obtain new wt for pt when feasible (wt in chart is estimated).  4. RD to monitor wt and lab trends.  5. PO diet when safe/appropriate per SLP/MD and pt can adequately consume.    RD following.

## 2018-02-20 NOTE — ASSESSMENT & PLAN NOTE
This is severe sepsis with the following associated acute organ dysfunction(s): acute kidney failure.metabolic encephalopathy    Continue Zosyn and pressors follow up on final blood culture results

## 2018-02-20 NOTE — ASSESSMENT & PLAN NOTE
hepatic encephalopathy    With ileus unable to tolerate oral lactulose and too unstable for repositioning for rectal lactulose  Continue rifaximin and monitor clinically

## 2018-02-20 NOTE — PROGRESS NOTES
Pulmonary Critical Care Progress Note        Date of Service:  2/20/2018  Chief Complaint: Worsening cough and dyspnea    History of Present Illness: 47 y.o. female admitted for acute hypoxic respiratory failure likely due to biventricular failure and cardiogenic shock.     ROS: Unable to obtain as the patient is on the ventilator and sedated  Interval Events:  24 hour interval history reviewed    - difficulty with chase last night and replaced   - not very responsive today with propofol off for 30 min   - adolfo at 300   - vaso at 0.04   - levo at 10   - lasic drip off due to hypotension this morning   - poor urinary output   - hypotensive this morning after turning her with SBPs to 70s   - started levo-->a-fib with RVR to 160-170s   - Amio bolus and drip started   - esmolol off now   - a-fib with -170s   - SBP 70-90s   - vent day #2   - PEEP still at 12, PIP at low 20s   - CXR(reviewed): pulm edema and hypoinflated lungs   - day #2 of unasyn/azithro   - worsening renal function    PFSH:  No change.    Respiratory:  Schreiber Vent Mode: APVCMV, Rate (breaths/min): 16, Vt Target (mL): 380, FiO2: 80, Static Compliance (ml / cm H2O): 20.9, Control VTE (exp VT): 308  Pulse Oximetry: 92 %    Exam: coarse throughout, breathing comfortably with ventilator  ImagingAvailable data reviewed   Recent Labs      02/19/18   1025  02/19/18   1404   ISTATAPH  7.341*  7.251*   ISTATAPCO2  52.1*  56.5*   ISTATAPO2  153*  151*   ISTATATCO2  30  27   [id number]  99  99   ISTATARTHCO3  28.2*  24.8   ISTATARTBE  1  -3   ISTATTEMP  37.0 C  96.2 F   ISTATFIO2  60  100   ISTATSPEC  Arterial  Arterial   ISTATAPHTC  7.341*  7.269*   [id number]  153*  143*       HemoDynamics:  Pulse: 60, Heart Rate (Monitored): (!) 106  Blood Pressure: 133/87, Arterial BP: (!) 85/64, NIBP: 100/75  CVP (mm Hg): (!) 45 MM HG    Exam: rapid irregularly irregular without obvious murmur, 1+ dpp=, dusky/cyanotic toes, 1-2+ pedal edema  Imaging: Available data  reviewed   ECHO 2/19:  No prior study is available for comparison.   Poor visualization of all cardiac structures due to body habitus   despite contrast use.  Severely reduced left ventricular systolic function by gross   evaluation.  Flattened septum in systole and diastole consistent with RV pressure   and volume overload.  Severely dilated right ventricle.  Reduced right ventricular systolic function.  Valves not seen.  Recent Labs      02/19/18   0855   TROPONINI  <0.01   BNPBTYPENAT  176*       Neuro:  GCS Total Effie Coma Score: 6       Exam: lightly sedated, minimally responsive to painful stimulation, moving extremities occasionally, grimaces with symmetrical facial expressions  Imaging: Available data reviewed    Fluids:  Intake/Output       02/18/18 0700 - 02/19/18 0659 (Not Admitted) 02/19/18 0700 - 02/20/18 0659 02/20/18 0700 - 02/21/18 0659      6975-2352 8325-1758 Total 2831-9899 3851-5362 Total 4783-3468 7799-9416 Total       Intake    I.V.  --  -- --  494  1454.2 1948.2  --  -- --    Esmolol Volume -- -- -- 193 388.8 581.8 -- -- --    Phenylephrine Volume -- -- -- 141 210.7 351.7 -- -- --    Propofol Volume -- -- -- -- 584.8 584.8 -- -- --    Bumex -- -- -- 60 60 120 -- -- --    IVPB -- -- -- 100 200 300 -- -- --    IV Volume (Lasix) -- -- -- -- 10 10 -- -- --    Total Intake -- -- -- 494 1454.2 1948.2 -- -- --       Output    Urine  --  -- --  1175  50 1225  --  -- --    Indwelling Cathether -- -- -- 1175 50 1225 -- -- --    Stool  --  -- --  --  -- --  --  -- --    Number of Times Stooled -- -- -- 1 x 0 x 1 x -- -- --    Total Output -- -- -- 1175 50 1225 -- -- --       Net I/O     -- -- -- -681 1404.2 723.2 -- -- --        Weight: (!) 250 kg (551 lb 2.4 oz)  Recent Labs      02/19/18   0855  02/19/18   1525   SODIUM  136  138   POTASSIUM  4.0  4.5   CHLORIDE  102  106   CO2  27  22   BUN  20  22   CREATININE  1.05  1.30   CALCIUM  9.0  8.7       GI/Nutrition:  Exam: abdomen is soft and  non-tender, normal active bowel sounds, without masses or organomegaly  Imaging: Available data reviewed  NPO  Liver Function  Recent Labs      18   0855  18   1525   ALTSGPT  404*   --    ASTSGOT  290*   --    ALKPHOSPHAT  109*   --    TBILIRUBIN  1.8*   --    GLUCOSE  99  126*       Heme:  Recent Labs      18   0855   RBC  4.40   HEMOGLOBIN  12.3   HEMATOCRIT  40.9   PLATELETCT  264   PROTHROMBTM  17.2*   APTT  25.2   INR  1.44*       Infectious Disease:  Temp  Av.2 °C (97.1 °F)  Min: 36.1 °C (96.9 °F)  Max: 36.7 °C (98.1 °F)  Micro: reviewed  Recent Labs      18   0855   WBC  7.8   NEUTSPOLYS  89.60*   LYMPHOCYTES  5.70*   MONOCYTES  4.10   EOSINOPHILS  0.10   BASOPHILS  0.10   ASTSGOT  290*   ALTSGPT  404*   ALKPHOSPHAT  109*   TBILIRUBIN  1.8*     Current Facility-Administered Medications   Medication Dose Frequency Provider Last Rate Last Dose   • vasopressin (VASOSTRICT) 20 Units in  mL Infusion  0.03 Units/min Continuous Narinder Encarnacion M.D.       • MD ALERT...Adult ICU Electrolyte Replacement per Pharmacy Protocol   pharmacy to dose Reece Pratt D.O.       • fentaNYL (SUBLIMAZE) injection 25 mcg  25 mcg Q HOUR PRN Reece Pratt D.O.        Or   • fentaNYL (SUBLIMAZE) injection 50 mcg  50 mcg Q HOUR PRN Reece Pratt D.O.        Or   • fentaNYL (SUBLIMAZE) injection 100 mcg  100 mcg Q HOUR PRN TRUDI Ramirez.O.       • ipratropium-albuterol (DUONEB) nebulizer solution 3 mL  3 mL Q2HRS PRN (RT) Reece Pratt D.O.       • ipratropium-albuterol (DUONEB) nebulizer solution 3 mL  3 mL Q4HRS (RT) Reece Pratt D.O.   3 mL at 18 0219   • famotidine (PEPCID) tablet 20 mg  20 mg Q12HRS Reece Pratt D.O.        Or   • famotidine (PEPCID) injection 20 mg  20 mg Q12HRS NAIMA RamirezOSunil   20 mg at 18 2218   • insulin regular (HUMULIN R) injection 3-14 Units  3-14 Units Q6HRS TRUDI Ramirez.O.   Stopped at 18 1200    And   •  glucose 4 g chewable tablet 16 g  16 g Q15 MIN PRN Reece Pratt D.O.        And   • dextrose 50% (D50W) injection 25 mL  25 mL Q15 MIN PRN Reece Pratt D.O.       • senna-docusate (PERICOLACE or SENOKOT S) 8.6-50 MG per tablet 2 Tab  2 Tab BID Richie Agrawal M.D.   Stopped at 02/19/18 1100    And   • polyethylene glycol/lytes (MIRALAX) PACKET 1 Packet  1 Packet QDAY PRN Richie gArawal M.D.        And   • magnesium hydroxide (MILK OF MAGNESIA) suspension 30 mL  30 mL QDAY PRN Richie Agrawal M.D.        And   • bisacodyl (DULCOLAX) suppository 10 mg  10 mg QDAY PRN Richie Agrawal M.D.       • acetaminophen (TYLENOL) tablet 650 mg  650 mg Q6HRS PRN Richie Agrawal M.D.       • NS (BOLUS) infusion 500 mL  500 mL Once PRN Richie Agrawal M.D.       • ampicillin/sulbactam (UNASYN) 3 g in  mL IVPB  3 g Q6HRS Richie Agrawal M.D.   Stopped at 02/20/18 0034   • azithromycin (ZITHROMAX) tablet 250 mg  250 mg Q24HRS Richie Agrawal M.D.       • ondansetron (ZOFRAN) syringe/vial injection 4 mg  4 mg Q4HRS PRN Richie Agrawal M.D.       • ondansetron (ZOFRAN ODT) dispertab 4 mg  4 mg Q4HRS PRN Richie Agrawal M.D.       • promethazine (PHENERGAN) tablet 12.5-25 mg  12.5-25 mg Q4HRS PRN Richie Agrawal M.D.       • promethazine (PHENERGAN) suppository 12.5-25 mg  12.5-25 mg Q4HRS PRN Richie F Hollie Merhi, M.D.       • prochlorperazine (COMPAZINE) injection 5-10 mg  5-10 mg Q4HRS PRN Richie Agrawal M.D.       • propofol (DIPRIVAN) injection  0-80 mcg/kg/min Continuous NAIMA RamirezO. 30 mL/hr at 02/20/18 0416 20 mcg/kg/min at 02/20/18 0416   • chlorhexidine (PERIDEX) 0.12 % solution 15 mL  15 mL BID NAIMA RamirezO.   15 mL at 02/19/18 2218   • lidocaine (XYLOCAINE) 1%  injection  1-2 mL Q30 MIN PRN Reece Pratt D.O.       • MD ALERT...Pharmacy to implement PROPOFOL CRITICAL CARE PROTOCOL 1 Each  1 Each PRN Reece Pratt D.O.       •  methylPREDNISolone (SOLU-MEDROL) 40 MG injection 40 mg  40 mg Q6HRS Reece Pratt D.O.   40 mg at 02/20/18 0004   • tobramycin (TOBREX) 0.3 % ophthalmic solution 1 Drop  1 Drop Q4HRS Reece Pratt D.O.   1 Drop at 02/20/18 0417   • esmolol (BREVIBLOC) 2.5 g/250 mL NS infusion (PREMIX)  0-200 mcg/kg/min Continuous Reece Pratt D.O. 38 mL/hr at 02/20/18 0417 25 mcg/kg/min at 02/20/18 0417   • enoxaparin (LOVENOX) inj 100 mg  100 mg Q12HRS Reece Pratt D.O.   100 mg at 02/19/18 2218   • Respiratory Care per Protocol   Continuous RT Richie Agrawal M.D.       • phenylephrine (NAY-SYNEPHRINE) 40,000 mcg in  mL Infusion  0-300 mcg/min Continuous Olivia Jimenes M.D. 84.4 mL/hr at 02/20/18 0442 225 mcg/min at 02/20/18 0442   • furosemide (LASIX) 100 mg in  mL infusion  5 mg/hr Continuous Olivia Jimenes M.D. 5 mL/hr at 02/20/18 0015 5 mg/hr at 02/20/18 0015     Last reviewed on 2/19/2018  3:59 PM by Ryan Correa R.N.    Quality  Measures:  EKG reviewed, Medications reviewed, Labs reviewed and Radiology images reviewed  Crooks catheter: No Crooks  Central line in place: Need for access, Vasopressors and Concentrated IV drugs    DVT Prophylaxis: Heparin  DVT prophylaxis - mechanical: SCDs  Ulcer prophylaxis: Yes  Antibiotics: Treating active infection/contamination beyond 24 hours perioperative coverage        Problems/Plan:    Acute Hypoxic and Hypercapnic Respiratory Failure   - intubated in ER on 2/18   - cont full vent support   - cont RT/O2 protocols   - No SBTs due to PEEP of 12   - attempted diuresis but unable due to hemodynamic instability   - follow ABGs/CXR and adjust ventilator   - cont vent bundle protocols  Acute Pulmonary Edema   - likely due to cardiogenic shock and biventricular failure   - cont supportive vent measures   - attempted lasix drip, but too hemodynamically unstable  Cardiogenic Shock   - ECHO revealed bivent failure   - cont vasopressors and titrate for MAP  > 65   - cardiology consulted  Acute Atrial Fibrillation with RVR   - heparin drip   - esmolol stopped   - amio bolus and drip  Acute Biventricular Heart Failure   - cont vasopressors for MAP goals >65   - cardiology consulted   - noted RV dilation and concern for PE, but due to patient's weight and JAYSON unable to do CTA to rule out PE  Acute Kidney Injury   - follow creatinine and UOP   - avoid nephrotoxins  Anasarca   - due to bivent heart failure   - may need HD/SCUF  Acute leukocytosis   - started steroids for possible COPD exacerbation   - stop steroids   - follow  Hyperkalemia   - follow  Severe Morbid Obesity  Recent Influenza A infection  Prophylaxis   - SCDs, heparin, pepcid  Prognosis   - critical to poor based on current therapies of maximum doses of vasopressors and uncontrolled hypotension and a-fib.  We have been attempting to reach the patient's  to discuss the severity of her case as well as her limited chances at survival. Will continue full treatment for now and await their arrival.    Discussed patient condition and risk of morbidity and/or mortality with RN, RT, Therapies, Pharmacy, Dietary, , Charge nurse / hot rounds and cardiology and hospitalist.    The patient remains critically ill.  Critical care time = 48 minutes in directly providing and coordinating critical care and extensive data review.  No time overlap and excludes procedures.

## 2018-02-20 NOTE — PROGRESS NOTES
[facility] Hospitalist Progress Note    Date of Service: 2018    Chief Complaint  47 y.o. female admitted 2018 with Afib anasarca and acute respiratory failure, hx of non compliance with meds    Interval Problem Update  Hypotensive on 3 pressors with severe acidosis    Consultants/Specialty  Dr Jimenes   cardiology    Disposition  ICU        Review of Systems   Unable to perform ROS: Intubated      Physical Exam  Laboratory/Imaging   Hemodynamics  Temp (24hrs), Av.3 °C (97.3 °F), Min:36.1 °C (97 °F), Max:36.8 °C (98.2 °F)   Temperature: 36.7 °C (98.1 °F)  Pulse  Av.8  Min: 32  Max: 135 Heart Rate (Monitored): (!) 116  Arterial BP: (!) 86/65, NIBP: 100/75 CVP (mm Hg): (!) 43 MM HG    Respiratory  Schreiber Vent Mode: APVCMV, Rate (breaths/min): 16, PEEP/CPAP: 12, PEEP/CPAP: 12, FiO2: 80, P Peak (PIP): 35, P MEAN: 18   Respiration: (!) 21, Pulse Oximetry: 100 %     Work Of Breathing / Effort: Vented  RUL Breath Sounds: Diminished, RML Breath Sounds: Diminished, RLL Breath Sounds: Diminished, TEJAS Breath Sounds: Diminished, LLL Breath Sounds: Diminished    Fluids    Intake/Output Summary (Last 24 hours) at 18 1109  Last data filed at 18 0600   Gross per 24 hour   Intake          2531.64 ml   Output             1225 ml   Net          1306.64 ml       Nutrition  Orders Placed This Encounter   Procedures   • Diet NPO     Standing Status:   Standing     Number of Occurrences:   1     Order Specific Question:   Type:     Answer:   Now [1]     Order Specific Question:   Restrict to:     Answer:   Strict [1]     Physical Exam   Constitutional: She appears well-developed.   HENT:   Head: Normocephalic and atraumatic.   Right Ear: External ear normal.   Left Ear: External ear normal.   Mouth/Throat: No oropharyngeal exudate.   Eyes: Right eye exhibits no discharge. Left eye exhibits no discharge. No scleral icterus.   Neck: Neck supple. No JVD present. No tracheal deviation present.   Cardiovascular:  An irregularly irregular rhythm present. Tachycardia present.  Exam reveals no gallop and no friction rub.    No murmur heard.  Pulmonary/Chest: No respiratory distress. She has no wheezes. She has rales. She exhibits no tenderness.   Abdominal: Soft. Bowel sounds are normal. She exhibits no distension. There is no tenderness. There is no rebound.   Musculoskeletal: She exhibits edema (3+). She exhibits no tenderness.   Neurological: No cranial nerve deficit. She exhibits normal muscle tone.   Sedated does not follow    Skin: Skin is warm and dry. She is not diaphoretic. No cyanosis. Nails show no clubbing.   Psychiatric:   sedated   Nursing note and vitals reviewed.      Recent Labs      02/19/18   0855  02/20/18   0411   WBC  7.8  13.2*   RBC  4.40  4.91   HEMOGLOBIN  12.3  13.5   HEMATOCRIT  40.9  48.2*   MCV  93.0  98.2*   MCH  28.0  27.5   MCHC  30.1*  28.0*   RDW  50.4*  54.5*   PLATELETCT  264  286   MPV  9.8  10.5     Recent Labs      02/19/18   0855  02/19/18   1525  02/20/18   0411   SODIUM  136  138  135   POTASSIUM  4.0  4.5  5.3   CHLORIDE  102  106  102   CO2  27  22  29   GLUCOSE  99  126*  106*   BUN  20  22  27*   CREATININE  1.05  1.30  1.86*   CALCIUM  9.0  8.7  8.7     Recent Labs      02/19/18   0855  02/20/18   0411  02/20/18   1015   APTT  25.2   --   34.8   INR  1.44*  1.64*   --      Recent Labs      02/19/18   0855   BNPBTYPENAT  176*     Recent Labs      02/19/18   1525   TRIGLYCERIDE  106          Assessment/Plan     Metabolic acidosis   Assessment & Plan    Will give 2 amps of bicarb and start bicarb drip        Sepsis ([facility])- (present on admission)   Assessment & Plan    This is severe sepsis with the following associated acute organ dysfunction(s): acute kidney failure.metabolic encephalopathy    Continue current antibiotics and pressors        Chronic atrial fibrillation (CMS-HCC)   Assessment & Plan    Amiodarone drip  Anticoagulation with heparin        Cardiomyopathy (CMS-HCC)    Assessment & Plan    EF20%  Given shock no BB or ACEI  Will ask for cardiology input discussed with Dr sandoval        Shock ([facility])   Assessment & Plan    Likely cardiogenic and possibly septic  Continue levophed vasopressin and norepi drips and stoney off as tolerated  D/c bumex drip  Monitor UO        Elevated LFTs   Assessment & Plan    US consistent steatosis        Acute metabolic encephalopathy   Assessment & Plan    Wean off sedation as tolerated        Cor pulmonale (CMS-HCC)   Assessment & Plan    Hold diuretic given shock  Heparin drip for Afib and also possible PE given RV strain on echo        PNA (pneumonia)   Assessment & Plan    Continue unasyn azithromycin  F/u on cx        Morbid obesity (CMS-HCC)   Assessment & Plan    Body mass index is 91.72 kg/m².         Acute respiratory failure (CMS-HCC)   Assessment & Plan    Vent management per Dr Jimenes            Quality-Core Measures   Reviewed items::  Labs reviewed, Medications reviewed and Radiology images reviewed  Crooks catheter::  Critically Ill - Requiring Accurate Measurement of Urinary Output  Central line in place:  Vasopressors  DVT prophylaxis pharmacological::  Heparin  Antibiotics:  Treating active infection/contamination beyond 24 hours perioperative coverage      Overall prognosis guarded per RN  on his way will discuss goals of care with him    >Patient is critically ill.   >The patient is having :shock metabolic acidosis  >The vital organ system that is effected is the:Cv  >If untreated there is a high chance of deterioration into: death  >The critical care that I am providing today is: pressor, bicarb drip  >The critical care that has been undertaken is medically complex.   >There has been no overlap in critical care time.   Critical care time not including procedures, no overlap: 38 minutes

## 2018-02-20 NOTE — PROGRESS NOTES
2 RN assessment completed with Chris RN. Skin intact/  Some reddened areas BLE, all areas anya.

## 2018-02-20 NOTE — CARE PLAN
Problem: Ventilation Defect:  Goal: Ability to achieve and maintain unassisted ventilation or tolerate decreased levels of ventilator support  Vent day 2  Intervention: Monitor ventilator weaning response  No SBT at this time

## 2018-02-20 NOTE — CARE PLAN
Problem: Ventilation Defect:  Goal: Ability to achieve and maintain unassisted ventilation or tolerate decreased levels of ventilator support    Intervention: Support and monitor invasive and noninvasive mechanical ventilation  Adult Ventilation Update    Total Vent Days: 1  Vent:  x 18, 60% +12    Patient Lines/Drains/Airways Status    Active Airway     Name: Placement date: Placement time: Site: Days:    Airway Group ET Tube Oral 8.0 02/19/18   1116   Oral   1                 Events/Summary/Plan: Patient arrived from ER.

## 2018-02-20 NOTE — CARE PLAN
Problem: Safety  Goal: Will remain free from falls  Outcome: PROGRESSING AS EXPECTED    Intervention: Assess risk factors for falls  Risk factors assessed        Problem: Infection  Goal: Will remain free from infection  Outcome: PROGRESSING AS EXPECTED    Intervention: Assess signs and symptoms of infection  S/s assessed  Intervention: Implement standard precautions and perform hand washing before and after patient contact  Hand washing performed   Intervention: Give CDC handouts for infection prevention (infection prevention/hand washing, disease specific, and device specific) and document in Education  Pt intubated/ sedated   Intervention: Assess for removal of potential routes of infection, such as IV, central line, intra-arterial or urinary catheters  Routes assessed

## 2018-02-21 PROBLEM — N17.9 ACUTE KIDNEY INJURY (HCC): Status: ACTIVE | Noted: 2018-01-01

## 2018-02-21 NOTE — PROGRESS NOTES
[facility] consulted. Referral number [id number]  They requested a phone call if neuro status changes or family decides comfort care

## 2018-02-21 NOTE — CARE PLAN
Problem: Safety  Goal: Will remain free from falls  Outcome: PROGRESSING AS EXPECTED    Intervention: Assess risk factors for falls  Risk factors assessed      Problem: Infection  Goal: Will remain free from infection  Outcome: PROGRESSING AS EXPECTED    Intervention: Assess signs and symptoms of infection  S/s assessed  Intervention: Implement standard precautions and perform hand washing before and after patient contact  Hand washing performed   Intervention: Give CDC handouts for infection prevention (infection prevention/hand washing, disease specific, and device specific) and document in Education  Pt intubated/sedated  Intervention: Assess for removal of potential routes of infection, such as IV, central line, intra-arterial or urinary catheters  Routes assessed

## 2018-02-21 NOTE — CARE PLAN
Problem: Ventilation Defect:  Goal: Ability to achieve and maintain unassisted ventilation or tolerate decreased levels of ventilator support  Adult Ventilation Update    Total Vent Days: 2    APVcmv 20, 360, +12, 100%    Patient Lines/Drains/Airways Status    Active Airway     Name: Placement date: Placement time: Site: Days:    Airway Group ET Tube Oral 8.0 02/19/18   1116   Oral   1              Patient failed trials because of Barriers to Wean: FiO2 >60% or PEEP >10 CM H2O (02/20/18 0628)    Cough: Productive (02/20/18 1440)  Sputum Amount: Scant (02/20/18 1600)  Sputum Color: White (02/20/18 1600)  Sputum Consistency: Thick (02/20/18 1600)    Mobility Group  Activity Performed: Unable to mobilize (Pt is too unstable, became extremely hypotensive S/P turn ) (02/20/18 1600)  Reason Not Mobilized: Unstable condition (02/20/18 0200)  Mobilization Comments: PEEP 12, recently intubated (02/20/18 0200)    Events/Summary/Plan: no SBTs, ABG drawn, SpO2 shows 93% (02/20/18 1658), rate increased to 20 per Dr. Jimenes

## 2018-02-21 NOTE — ASSESSMENT & PLAN NOTE
Likely ATN secondary to sepsis and shock  Pt is anuric  Continue daily hemodialysis as tolerated   discussed with Dr Vasquez    Will give 1 amp of IV bicarbonate for hyperkalemia

## 2018-02-21 NOTE — CARE PLAN
Problem: Ventilation Defect:  Goal: Ability to achieve and maintain unassisted ventilation or tolerate decreased levels of ventilator support  Vent day 3  Intervention: Monitor ventilator weaning response  No SBT at this time

## 2018-02-21 NOTE — PROGRESS NOTES
lAessandro from Lab called with critical result of lactic acid of 9.1 at 1040. Critical lab result read back to Alessandro.   Dr. Jimenes notified of critical lab result at 1045.  Critical lab result read back by Dr. Jimenes.

## 2018-02-21 NOTE — CARE PLAN
Problem: Nutritional:  Goal: Nutrition support tolerated and meeting greater than 85% of estimated needs  Outcome: MET Date Met: 02/21/18

## 2018-02-21 NOTE — CONSULTS
Reason for PC Consult: Advance Care Planning    Assessment:  General:   47 yr old female admitted via ambulance from [facility] [place] with increased SOB x 2 weeks, increased edema, non compliant of medications, morbidly obese.  Patient stated, before intubation, and family repeats that she was taking her prescribed medications, had a reaction (swelling of the face), contacted the MD and was told to try another dose, then abruptly stopped all medications.  Past medical history of noncompliance heart failure, HTN, lymphedema and obesity.  Admitted Assessment.  1.  Acute respiratory failure.  2.  Possible pneumonia.  3.  Morbid obesity.  4.  Anasarca, likely secondary to cor pulmonale.  5.  Acute metabolic encephalopathy.  6.  Chronic atrial fibrillation with rapid ventricular response.  7.  Noncompliance with medical therapy.  8.  Elevated liver function tests, query secondary to congestive hepatopathy.  Social- Patient resides in [place] with her s/o vs spouse x 1 yr, [phone], she has a daughter, Gabbie Waters [phone] who lives in [place];last saw the patient a yr ago and a autistic son Mark Franco.  No documented tobacco or alcohol use.  Dyspnea: Yes- Intubate 2/2 FVO  Last BM: 02/19/18-    Pain: Unable to determine-    Depression: Unable to determine-    Dementia: Unable to Determine;       Spiritual:  Is Restorationism or spirituality important for coping with this illness? Unable to determine-    Has a  or spiritual provider visit been requested? Unable to determine    Palliative Performance Scale: 10%    Advance Directive: None on File  DPOA: None on File  POLST: None on File    Code Status: Full-  PC to Gabbie and Osito to discussed code status in detail.  Both would like to discuss further with each other before making a change.    Outcome:  I met with GIANA Davis at the patient'st bedside (T601/00).  He provided updated information and current treatments.  I contacted Osito  "introduced myself and the role of Palliative Care in the patiet's POC.  Osito stated that they have been  for a year.  Osito reviews the events leading up to the patient's admission.  Patient received a new medication to which she had a reaction to, called the MD to reinstate the previously used medication.  She was instructed to \"try it one more time\", she declined to do so and stop medications abruptly.  When asked if he understood her medical status he was able to present her previous history.  When asked for the current medical issues in the hospital he explains that \"she has too much fluid and her heart is weak\".  He is aware that she is critical.  I explained in detail the life sustaining measures used to keep her alive at this time and addressed code status with reported poor prognosis.  He stated understanding and admits that they have never discussed these topics in their relationship so he has no idea what her wishes would be.  I offered an opportunity to talk with both he and Gabbie to review all information and answer questions.  He agrees and would like to schedule a time for tomorrow.    Return call from Gabbie, daughter, I introduced myself and the role of Palliative Care in the patiet's POC.  I reviewed my conversation with Osito, current medical status, life sustaining measures provided and code status, in detail, with reported poor prognosis.  Gabbie stated understanding.  All questions were answered in full, Gabbie will reach out to Osito donahue and would like to discuss again tomorrow 2/21/18.    Contact for Palliative Care was provided and patient/family is encourage to call for questions, concerns and/or support.      Plan: 3-way call with Osito and Gabbie to discuss ongoing care, patient's wishes as they believe them to be and goals of care.      Recommendations: Hospice/Ethics inappropriate at this time pending further conversation with the family for anticipate " outcomes and goals of care.  Updated: Ryan Loredo, RN    Thank you for allowing Palliative Care to participate in this patient's care. Please feel free to call [phone] with any questions or concerns.

## 2018-02-21 NOTE — PROGRESS NOTES
"Interval History:  47F with extreme obesity (BMI 92), atrial fibrillation, biventricular systolic heart failure, and medical noncompliance admitted for dyspnea and hypoxemia in the setting of recent influenza pneumonia. She has been intubated and is sedated, unable to participate in the interview. Noted to have AFRVR with HRs now improving on therapy. Subsequently she has remained hypoxic and hypercarbic with new acute renal failure and mixed metabolic and respiratory acidosis and shock on high dose vasopressors including norepinephrine, vasopressin and phenylephrine.   2/21: Remains ventilated, persistent lactic acidosis, and progressive leukocytosis and multiorgan system dysfunction. Remains atrial fibrillation with physiologically appropriate rates in the low 100s. CVP climbing now up to 50. Grossly volume overloaded. Urine output nearly anuric.    Physical Exam   Blood pressure 133/87, pulse (!) 108, temperature 35.9 °C (96.7 °F), resp. rate 17, height 1.651 m (5' 5\"), weight (!) 250 kg (551 lb 2.4 oz), SpO2 (!) 58 %.    Constitutional: Extreme obesity. Appears well-developed.   HENT: Normocephalic and atraumatic. No scleral icterus.   Neck: Unable to assess JVD due to body habitus.  Cardiovascular: Tachycardic and irregular, distant heart sounds.rate. Exam reveals no gallop and no friction rub. No murmur heard.   Pulmonary/Chest: Coarse   Abdominal: S/NT/ND BS -   Musculoskeletal:  Pulses present. No atrophy. Unable to assess strength.  Extremities: Exhibits 4+ BLE edema. No clubbing or cyanosis.   Skin: Skin is cool and dry.   Neuro: Non-focal, CN 2-12 unable to assess       ROS: Unable to obtain, patient intubated and sedated unable to communicate effectively.      Intake/Output Summary (Last 24 hours) at 02/21/18 0723  Last data filed at 02/21/18 0600   Gross per 24 hour   Intake          4490.59 ml   Output               55 ml   Net          4435.59 ml       Recent Labs      02/19/18   0855  02/20/18   0411 "  02/21/18   0317   WBC  7.8  13.2*  30.6*   RBC  4.40  4.91  4.64   HEMOGLOBIN  12.3  13.5  13.1   HEMATOCRIT  40.9  48.2*  45.1   MCV  93.0  98.2*  97.2   MCH  28.0  27.5  28.2   MCHC  30.1*  28.0*  29.0*   RDW  50.4*  54.5*  54.4*   PLATELETCT  264  286  223   MPV  9.8  10.5  11.1     Recent Labs      02/19/18   1525  02/20/18   0411  02/21/18   0317   SODIUM  138  135  137   POTASSIUM  4.5  5.3  5.1   CHLORIDE  106  102  102   CO2  22  29  14*   GLUCOSE  126*  106*  150*   BUN  22  27*  36*   CREATININE  1.30  1.86*  2.64*   CALCIUM  8.7  8.7  7.7*     Recent Labs      02/19/18   0855  02/20/18   0411  02/20/18   1015  02/20/18   1645  02/21/18   0317   APTT  25.2   --   34.8  52.7*   --    INR  1.44*  1.64*   --    --   3.07*     Recent Labs      02/19/18   0855   BNPBTYPENAT  176*     Recent Labs      02/19/18   0855  02/20/18   0411  02/21/18   0317   TROPONINI  <0.01  0.03  2.47*   BNPBTYPENAT  176*   --    --      Recent Labs      02/19/18   1525  02/21/18   0317   TRIGLYCERIDE  106  103       No current facility-administered medications on file prior to encounter.      No current outpatient prescriptions on file prior to encounter.       Current Facility-Administered Medications   Medication Dose Frequency Provider Last Rate Last Dose   • vasopressin (VASOSTRICT) 20 Units in  mL Infusion  0.03 Units/min Continuous Narinder Encarnacion M.D. 9 mL/hr at 02/21/18 0422 0.03 Units/min at 02/21/18 0422   • amiodarone (CORDARONE) 450 mg in D5W 250 mL Infusion  0.5 mg/min Continuous Olivia Jimenes M.D. 17 mL/hr at 02/20/18 1809 0.5 mg/min at 02/20/18 1809   • dexmedetomidine (PRECEDEX) 400 mcg in D5W 100 mL infusion  0.1-1.5 mcg/kg/hr Continuous Olivia Jimenes M.D. 31.3 mL/hr at 02/21/18 0524 0.5 mcg/kg/hr at 02/21/18 0524   • heparin injection 5,000 Units  5,000 Units PRN Richie Agrawal M.D.        And   • heparin infusion 25,000 units in 500 ml 0.45% nacl   Continuous Richie Agrawal M.D.    Stopped at 02/20/18 1907   • sodium bicarbonate 8.4 % 150 mEq in D5W 1,000 mL infusion  150 mEq Continuous Richie Agrawal M.D. 100 mL/hr at 02/21/18 0047 150 mEq at 02/21/18 0047   • phenylephrine (NAY-SYNEPHRINE) 80,000 mcg in  mL Infusion  0-300 mcg/min Continuous Olivia Jimenes M.D. 3.8 mL/hr at 02/21/18 0524 20 mcg/min at 02/21/18 0524   • norepinephrine (LEVOPHED) 16 mg in  mL Infusion  0-30 mcg/min Continuous Olivia Jimenes M.D.   Stopped at 02/21/18 0333   • MD ALERT...Adult ICU Electrolyte Replacement per Pharmacy Protocol   pharmacy to dose TRUDI Ramirez.JUNIE.       • fentaNYL (SUBLIMAZE) injection 25 mcg  25 mcg Q HOUR PRN TRUDI Ramirez.O.        Or   • fentaNYL (SUBLIMAZE) injection 50 mcg  50 mcg Q HOUR PRN TRUDI Ramirez.O.        Or   • fentaNYL (SUBLIMAZE) injection 100 mcg  100 mcg Q HOUR PRN TRUDI Ramirez.O.       • ipratropium-albuterol (DUONEB) nebulizer solution 3 mL  3 mL Q2HRS PRN (RT) TRUDI Ramirez.O.       • ipratropium-albuterol (DUONEB) nebulizer solution 3 mL  3 mL Q4HRS (RT) TRUDI Ramirez.O.   3 mL at 02/21/18 0621   • famotidine (PEPCID) tablet 20 mg  20 mg Q12HRS TRUDI Ramirez.O.       • insulin regular (HUMULIN R) injection 3-14 Units  3-14 Units Q6HRS TRUDI Ramirez.O.   Stopped at 02/19/18 1200    And   • glucose 4 g chewable tablet 16 g  16 g Q15 MIN PRN TRUDI Ramirez.O.        And   • dextrose 50% (D50W) injection 25 mL  25 mL Q15 MIN PRN TRUDI Ramirez.O.       • senna-docusate (PERICOLACE or SENOKOT S) 8.6-50 MG per tablet 2 Tab  2 Tab BID Richie Agrawal M.D.   2 Tab at 02/20/18 2050    And   • polyethylene glycol/lytes (MIRALAX) PACKET 1 Packet  1 Packet QDAY PRN Richie Agrawal M.D.        And   • magnesium hydroxide (MILK OF MAGNESIA) suspension 30 mL  30 mL QDAY PRN Richie Agrawal M.D.        And   • bisacodyl (DULCOLAX) suppository 10 mg  10 mg QDAY PRN Richie Agrawal M.D.       •  acetaminophen (TYLENOL) tablet 650 mg  650 mg Q6HRS PRN Richie Agrawal M.D.       • NS (BOLUS) infusion 500 mL  500 mL Once PRN Richie Agrawal M.D.       • ampicillin/sulbactam (UNASYN) 3 g in  mL IVPB  3 g Q6HRS Richie Agrawal M.D.   Stopped at 02/21/18 0450   • azithromycin (ZITHROMAX) tablet 250 mg  250 mg Q24HRS Richie Agrawal M.D.   250 mg at 02/20/18 1003   • ondansetron (ZOFRAN) syringe/vial injection 4 mg  4 mg Q4HRS PRN Richie Agrawal M.D.       • ondansetron (ZOFRAN ODT) dispertab 4 mg  4 mg Q4HRS PRN Richie Agrawal M.D.       • promethazine (PHENERGAN) tablet 12.5-25 mg  12.5-25 mg Q4HRS PRN Richie Agrawal M.D.       • promethazine (PHENERGAN) suppository 12.5-25 mg  12.5-25 mg Q4HRS PRN Richie Agrawal M.D.       • prochlorperazine (COMPAZINE) injection 5-10 mg  5-10 mg Q4HRS PRN Richie Agrawal M.D.       • chlorhexidine (PERIDEX) 0.12 % solution 15 mL  15 mL BID Reece Pratt D.OSunil   15 mL at 02/20/18 2050   • lidocaine (XYLOCAINE) 1%  injection  1-2 mL Q30 MIN PRN Reece Pratt DSHAYNA       • tobramycin (TOBREX) 0.3 % ophthalmic solution 1 Drop  1 Drop Q4HRS Reece Pratt D.OSunil   1 Drop at 02/21/18 0420   • Respiratory Care per Protocol   Continuous RT Richie Agrawal M.D.         Last reviewed on 2/19/2018  3:59 PM by Ryan Correa R.N.  Medications reviewed    Imaging reviewed    EKG (2/19/2018):  I have personally reviewed the EKG this visit and discussed with the patient. It shows , low voltage.     ECHO CONCLUSIONS (2/19/2018):  No prior study is available for comparison.   Poor visualization of all cardiac structures due to body habitus   despite contrast use.  Severely reduced left ventricular systolic function by gross   evaluation.  Flattened septum in systole and diastole consistent with RV pressure   and volume overload.  Severely dilated right ventricle.  Reduced right ventricular systolic function.  Valves not  seen.       Impressions:  1. Hypoxic and hypercarbic respiratory failure  2. Mixed cardiogenic and possibly obstructive shock  3. Lactic acidosis  4. Extreme obesity (BMI 92)  5. Severe biventricular systolic dysfunction  6. Acutely decompensated congestive heart failure, CVP 36  7. JAYSON, worsening  8. Possible pulmonary embolism  9. Systemic anticoagulation with heparin  10. Possible aspiration pneumonia  11. Atrial fibrillation  12. Incomplete database  13. Documented medical noncompliance  14. Hepatocellular injury  15. MultiOrgan system failure     Recommendations:  Agree with empiric therapy for possible acute pulmonary embolism and mixed obstructive and cardiogenic shock as well as possible aspiration pneumonia. Weaning pressors as tolerated.     1. If IV diuresis unsuccessful would recommend initiation of renal replacement therapy with ultrafiltration to manage her volume overload if this is consistent with her wishes and the wishes of her family given poor prognosis.  2. Continue vasopressor and inotropic support  3. IV heparin gtt ongoing   4. No BB, ACEI due to renal and hemodynamic compromise.  5. HR acceptable for degree of hemodynamic impairment. Amiodarone therapy suboptimal due to long duration of AF and no prior anticoagulation, however not able to tolerate other rate modifying mediations at this time.  6. Should HRs increase, recommend judicious digoxin administration with levels given JAYSON.       Prognosis remains grim.        Thank you for this interesting consultation. It was my pleasure to see Davina Franco today.     Josh Montano MD, FACC, [facility]  Division of Interventional Cardiology  [facility]

## 2018-02-22 NOTE — PROGRESS NOTES
JACKIE from Lab called with critical result of Lactic Acid 5.6 and Calcium 6.6 at 0614. Critical lab result read back to JACKIE.   Dr. Jimenes notified of critical lab result at 0634.  Critical lab result read back by Dr. Jimenes. No new orders at this time.

## 2018-02-22 NOTE — PROGRESS NOTES
Pulmonary Critical Care Progress Note        Date of Service:  2/22/2018  Chief Complaint: Worsening cough and dyspnea    History of Present Illness: 47 y.o. female admitted for acute hypoxic respiratory failure likely due to biventricular failure and cardiogenic shock.     ROS: Unable to obtain as the patient is on the ventilator and sedated  Interval Events:  24 hour interval history reviewed    - HD catheter placed by vascular yesterday   - pH has normalized   - Bicarb drip still at 100   - Amio at 0.5   - Vaso at 0.03   - Drake at 50   - lasix drip at 5   - precedex 0.6   - HD last night with 3 liters removed and this morning with 3 liters removed    - a-fib 100-120s   - SBP 90-110s   - TFs at goal   - heparin drip   - UOP of 51cc overnight   - vent day #4   - no SBTs since on APRV with pHigh of 28, FioO2 of 90%   - CXR(reviewed): severe pulm edema   - day #4 unasyn/azithromycin   - vanco x 1 dose    Yesterday's Events:   - no events overnight   - able to wean some vasopressors   - Levo off this morning   - vaso at 0.03, amio at 0.5, and drake at 30   - bicarb drip at 100   - precedex at 0.7   - moving all arms/legs and occasionally following   - a-fib at 90-100s   - SBP 80-90s   - CVP 40-50   - Tmax 36.9   - TFs at goal   - Anuric after 80mg of Lasix   - right IJ and left radial a-line   - vent day #3   - Changed to APRV this morning due to O2 sats at 85%   - CXR(reviewed): severe pulmonary edema, slightly better    PFSH:  No change.    Respiratory:  Schreiber Vent Mode: APRV, FiO2: 100, P Support: 15, Control VTE (exp VT): 498  Pulse Oximetry: 96 %    Exam: clear throughout, no wheezing  ImagingAvailable data reviewed   Recent Labs      02/20/18 1952 02/21/18   0414  02/22/18   0444   ISTATAPH  7.208*  7.182*  7.439   ISTATAPCO2  34.8  31.7  32.0   ISTATAPO2  79  58*  65   ISTATATCO2  15*  13*  23   EARQCJS8CHZ  93  83*  93   ISTATARTHCO3  13.9*  11.9*  21.7   ISTATARTBE  -13*  -15*  -2   ISTATTEMP  96.0 F  96.8  F  97.9 F   ISTATFIO2  100  90  100   ISTATSPEC  Arterial  Arterial  Arterial   ISTATAPHTC  7.227*  7.196*  7.445   YFERYDCI6CN  72  55*  63*       HemoDynamics:  Pulse: 95, Heart Rate (Monitored): (!) 109  Arterial BP: 109/68, NIBP: 114/70  CVP (mm Hg): (!) 40 MM HG    Exam:irregularly irregular, no murmur, 4+ pitting pedal edema, 2+ dpp=, delayed cap refill by 4-5 seconds, dusky toes (no changes)  Imaging: Available data reviewed   ECHO 2/19:  No prior study is available for comparison.   Poor visualization of all cardiac structures due to body habitus   despite contrast use.  Severely reduced left ventricular systolic function by gross   evaluation.  Flattened septum in systole and diastole consistent with RV pressure   and volume overload.  Severely dilated right ventricle.  Reduced right ventricular systolic function.  Valves not seen.  Recent Labs      02/19/18   0855  02/20/18   0411  02/21/18   0317  02/22/18   0518   TROPONINI  <0.01  0.03  2.47*  2.95*   BNPBTYPENAT  176*   --   370*   --        Neuro:  GCS Total Stella Coma Score: 7       Exam: lightly sedated, minimally responsive to painful stimulation, moving extremities occasionally, grimaces with symmetrical facial expressions (Unchanged)  Imaging: Available data reviewed    Fluids:  Intake/Output       02/20/18 0700 - 02/21/18 0659 02/21/18 0700 - 02/22/18 0659 02/22/18 0700 - 02/23/18 0659      0700-1859 1900-0659 Total 0700-1859 1900-0659 Total 9100-7667 2345-6629 Total       Intake    I.V.  2453  1277.6 3730.6  1950.6  2603.7 4554.3  --  -- --    Precedex Volume 56 371.3 427.3 318.6 323.3 641.9 -- -- --    Esmolol Volume 48 -- 48 -- -- -- -- -- --    Amiodarone Volume 396 204 600 -- 169.4 169.4 -- -- --    Vasopressin Volume 228 108 336 248 90 338 -- -- --    Phenylephrine Volume 1090 377.9 1467.9 89 81 170 -- -- --    Propofol Volume 45 -- 45 -- -- -- -- -- --    Norepinephrine Volume 552 216.4 768.4 15 -- 15 -- -- --    Heparin Volume -- -- --  -- 390 390 -- -- --    IVPB -- -- -- -- 700 700 -- -- --    IV Volume (Lasix) 38 -- 38 80 50 130 -- -- --    Sodium Bicarbonate -- -- -- 7173 855 5906 -- -- --    Other  --  -- --  --  15 15  --  -- --    Medications (P.O./ Enteral Liquids) -- -- -- -- 15 15 -- -- --    Enteral  190  570 760  1920  710 2630  --  -- --    Enteral Volume 190  650 2330 -- -- --    Free Water / Tube Flush -- 30 30 240 60 300 -- -- --    Total Intake 2643 1847.6 4490.6 3870.6 3328.7 7199.3 -- -- --       Output    Urine  30  25 55  0  41 41  --  -- --    Indwelling Cathether 30 25 55 0 41 41 -- -- --    Drains  0  -- 0  0  0 0  --  -- --    Residual Amount (ml) (Discarded) 0 -- 0 0 0 0 -- -- --    Dialysis  --  -- --  --  3000 3000  --  -- --    Dialysis Output (Dialysis Output) -- -- -- -- 3000 3000 -- -- --    Total Output 30 25 55 0 3041 3041 -- -- --       Net I/O     2613 1822.6 4435.6 3870.6 287.7 4158.3 -- -- --           Recent Labs      02/20/18   0411 02/21/18 0317 02/22/18 0518   SODIUM  135  137  136   POTASSIUM  5.3  5.1  5.0   CHLORIDE  102  102  100   CO2  29  14*  21   BUN  27*  36*  40*   CREATININE  1.86*  2.64*  3.35*   MAGNESIUM  2.1  1.9  1.9   PHOSPHORUS  7.8*  8.2*  6.3*   CALCIUM  8.7  7.7*  6.6*       GI/Nutrition:  Exam: obese, bowel sounds noted, ND/ND, no masses (no changes)  Imaging: Available data reviewed  [facility]  Liver Function  Recent Labs      02/19/18   0855   02/20/18 0411 02/21/18 0317 02/22/18   0518   ALTSGPT  404*   --    --    --    --    ASTSGOT  290*   --    --    --    --    ALKPHOSPHAT  109*   --    --    --    --    TBILIRUBIN  1.8*   --    --    --    --    GLUCOSE  99   < >  106*  150*  178*    < > = values in this interval not displayed.       Heme:  Recent Labs      02/19/18   0855  02/20/18   0411   02/20/18   1645  02/21/18   0317  02/21/18   1925  02/22/18   0123  02/22/18   0518   RBC  4.40  4.91   --    --   4.64   --    --    --    HEMOGLOBIN  12.3   13.5   --    --   13.1   --    --    --    HEMATOCRIT  40.9  48.2*   --    --   45.1   --    --    --    PLATELETCT  264  286   --    --   223   --    --    --    PROTHROMBTM  17.2*  19.1*   --    --   31.4*   --    --   34.7*   APTT  25.2   --    < >  52.7*   --   90.9*  90.0*   --    INR  1.44*  1.64*   --    --   3.07*   --    --   3.48*    < > = values in this interval not displayed.       Infectious Disease:  Temp  Av.3 °C (97.3 °F)  Min: 36.2 °C (97.2 °F)  Max: 36.4 °C (97.5 °F)  Micro: reviewed  Recent Labs      18   0855  18   0411  18   0317   WBC  7.8  13.2*  30.6*   NEUTSPOLYS  89.60*  84.90*  88.90*   LYMPHOCYTES  5.70*  5.10*  4.50*   MONOCYTES  4.10  9.30  5.60   EOSINOPHILS  0.10  0.00  0.00   BASOPHILS  0.10  0.20  0.10   ASTSGOT  290*   --    --    ALTSGPT  404*   --    --    ALKPHOSPHAT  109*   --    --    TBILIRUBIN  1.8*   --    --      Current Facility-Administered Medications   Medication Dose Frequency Provider Last Rate Last Dose   • phenylephrine (NAY-SYNEPHRINE) 80,000 mcg in D5W 250 mL Infusion  0-300 mcg/min Continuous Olivia Jimenes M.D. 3.8 mL/hr at 18 0331 20 mcg/min at 18 0331   • furosemide (LASIX) 100 mg in  mL infusion  5 mg/hr Continuous Richie Agrawal M.D. 5 mL/hr at 18 0005 5 mg/hr at 18 0005   • heparin injection 3,000 Units  3,000 Units DIALYSIS PRN Fiona Vasquez M.D.   3,000 Units at 18 1855   • SODIUM CHLORIDE 0.9 % IV SOLN           • vasopressin (VASOSTRICT) 20 Units in  mL Infusion  0.03 Units/min Continuous Narinder Encarnacion M.D. 9 mL/hr at 18 0331 0.03 Units/min at 18 0331   • amiodarone (CORDARONE) 450 mg in D5W 250 mL Infusion  0.5 mg/min Continuous Olivia Jimenes M.D. 17 mL/hr at 18 0001 0.5 mg/min at 18 0001   • dexmedetomidine (PRECEDEX) 400 mcg in D5W 100 mL infusion  0.1-1.5 mcg/kg/hr Continuous Olivia Jimenes M.D. 37.5 mL/hr at 18 0537 0.6 mcg/kg/hr  at 02/22/18 0537   • heparin injection 5,000 Units  5,000 Units PRN Richie Agrawal M.D.   Stopped at 02/21/18 1210    And   • heparin infusion 25,000 units in 500 ml 0.45% nacl   Continuous Richie Agrawal M.D. 39 mL/hr at 02/22/18 0240 1,950 Units/hr at 02/22/18 0240   • sodium bicarbonate 8.4 % 150 mEq in D5W 1,000 mL infusion  150 mEq Continuous Richie Agrawal M.D. 100 mL/hr at 02/22/18 0331 150 mEq at 02/22/18 0331   • norepinephrine (LEVOPHED) 16 mg in  mL Infusion  0-30 mcg/min Continuous Olivia Jimenes M.D.   Stopped at 02/21/18 0333   • MD ALERT...Adult ICU Electrolyte Replacement per Pharmacy Protocol   pharmacy to dose Reece Pratt D.O.       • fentaNYL (SUBLIMAZE) injection 25 mcg  25 mcg Q HOUR PRN NAIMA RamirezO.        Or   • fentaNYL (SUBLIMAZE) injection 50 mcg  50 mcg Q HOUR PRN NAIMA RamirezO.        Or   • fentaNYL (SUBLIMAZE) injection 100 mcg  100 mcg Q HOUR PRN TRUDI Ramirez.O.       • ipratropium-albuterol (DUONEB) nebulizer solution 3 mL  3 mL Q2HRS PRN (RT) NAIMA RamirezO.       • ipratropium-albuterol (DUONEB) nebulizer solution 3 mL  3 mL Q4HRS (RT) TRUDI Ramirez.O.   3 mL at 02/22/18 0626   • famotidine (PEPCID) tablet 20 mg  20 mg Q12HRS TRUDI Ramirez.O.   20 mg at 02/21/18 2138   • insulin regular (HUMULIN R) injection 3-14 Units  3-14 Units Q6HRS TRUDI Ramirez.O.   Stopped at 02/19/18 1200    And   • glucose 4 g chewable tablet 16 g  16 g Q15 MIN PRN NAIMA RamirezO.        And   • dextrose 50% (D50W) injection 25 mL  25 mL Q15 MIN PRN Reece Pratt D.O.       • senna-docusate (PERICOLACE or SENOKOT S) 8.6-50 MG per tablet 2 Tab  2 Tab BID Richie Agrawal M.D.   2 Tab at 02/21/18 2138    And   • polyethylene glycol/lytes (MIRALAX) PACKET 1 Packet  1 Packet QDAY PRN Richie Agrawal M.D.        And   • magnesium hydroxide (MILK OF MAGNESIA) suspension 30 mL  30 mL QDAY PRN Richie Agrawal M.D.         And   • bisacodyl (DULCOLAX) suppository 10 mg  10 mg QDAY PRN Richei Agrawal M.D.       • acetaminophen (TYLENOL) tablet 650 mg  650 mg Q6HRS PRN Richie Agrawal M.D.       • NS (BOLUS) infusion 500 mL  500 mL Once PRN Richie Agrawal M.D.       • ampicillin/sulbactam (UNASYN) 3 g in  mL IVPB  3 g Q6HRS Richie Agrawal M.D.   Stopped at 02/22/18 0214   • azithromycin (ZITHROMAX) tablet 250 mg  250 mg Q24HRS Richie Agrawal M.D.   250 mg at 02/21/18 0915   • ondansetron (ZOFRAN) syringe/vial injection 4 mg  4 mg Q4HRS PRN Richie Agrawal M.D.       • ondansetron (ZOFRAN ODT) dispertab 4 mg  4 mg Q4HRS PRN Richie Agrawal M.D.       • promethazine (PHENERGAN) tablet 12.5-25 mg  12.5-25 mg Q4HRS PRN Richie Agrawal M.D.       • promethazine (PHENERGAN) suppository 12.5-25 mg  12.5-25 mg Q4HRS PRN Richie Agrawal M.D.       • prochlorperazine (COMPAZINE) injection 5-10 mg  5-10 mg Q4HRS PRN Richie Agrawal M.D.       • chlorhexidine (PERIDEX) 0.12 % solution 15 mL  15 mL BID Reece Pratt D.OSunil   15 mL at 02/21/18 2138   • lidocaine (XYLOCAINE) 1%  injection  1-2 mL Q30 MIN PRN Reece Pratt D.OSunil       • tobramycin (TOBREX) 0.3 % ophthalmic solution 1 Drop  1 Drop Q4HRS Reece Pratt D.OSunil   Stopped at 02/22/18 0541   • Respiratory Care per Protocol   Continuous RT Richie Agrawal M.D.         Last reviewed on 2/19/2018  3:59 PM by Ryan Correa R.N.    Quality  Measures:  EKG reviewed, Medications reviewed, Labs reviewed and Radiology images reviewed  Crooks catheter: No Crooks  Central line in place: Need for access, Vasopressors and Concentrated IV drugs    DVT Prophylaxis: Heparin  DVT prophylaxis - mechanical: SCDs  Ulcer prophylaxis: Yes  Antibiotics: Treating active infection/contamination beyond 24 hours perioperative coverage        Problems/Plan:    Acute Hypoxic and Hypercapnic Respiratory Failure   - intubated in ER on 2/18   - cont  full vent support   - cont RT/O2 protocols   - cont APRV setting    **may need Flolan due to pulmonary HTN and RV dysfunction   - follow ABGs/CXR and adjust ventilator   - cont vent bundle protocols   - no SBTs  Acute Pulmonary Edema   - likely due to cardiogenic shock and biventricular failure   - cont supportive vent measures   - HD for volume  Acute Kidney Injury   - renal consulted   - HD catheter placed 2/21   - HD daily per nephrology  Cardiogenic Shock   - ECHO revealed bivent failure   - cont vasopressors and titrate for MAP > 65   - cardiology following  Acute Atrial Fibrillation with RVR   - heparin drip   - amio bolus and drip continued  Acute Biventricular Heart Failure   - cont vasopressors for MAP goals >65   - cardiology consulted   - noted RV dilation and concern for PE, but due to patient's weight and JAYSON unable to do CTA to rule out PE  Anasarca   - due to bivent heart failure   - cont HD/SCUF  Acute leukocytosis   - started steroids for possible COPD exacerbation   - stop steroids   - follow   - BCs with staph epi-->s/p one dose vanco   - follow cultures   - cont unasyn/azithro  Hyperkalemia   - follow  Severe Morbid Obesity  Recent Influenza A infection  Prophylaxis   - SCDs, heparin, pepcid  Prognosis   - Palliative following   - critical to poor based on current therapies of vasopressors, biventricular failure, and JAYSON with massive fluid overload.  We have reached the patient's  to discuss the severity of her case as well as her limited chances at survival. Will continue full treatment for now and await their arrival. Palliative following now as well due to the severity of the patient's case as well the multiorgan failure    Discussed patient condition and risk of morbidity and/or mortality with RN, RT, Therapies, Pharmacy, Dietary, , Charge nurse / hot rounds and cardiology and hospitalist.    The patient remains critically ill.  Critical care time = 38 minutes in directly  providing and coordinating critical care and extensive data review.  No time overlap and excludes procedures.

## 2018-02-22 NOTE — PROGRESS NOTES
HD ordered for 2.5 hours today per Dr. Vasquez. Patient on 2 pressors for blood pressure support, requiring titration. Net UF 3000ml. Report given to MIHAELA Gallegos primary RN.     Right IJ TC flushed, packed with heparin per designated amount in each port and capped.

## 2018-02-22 NOTE — OP REPORT
DATE OF SERVICE:  02/21/2018    PREOPERATIVE DIAGNOSIS:  Volume overload with renal failure.    POSTPROCEDURE DIAGNOSIS:  Volume overload with renal failure.    PROCEDURE PERFORMED:  Placement of right internal jugular vein hemodialysis   catheter.    SURGEON:  Erika Garcia MD.    ANESTHESIA:  None.    INDICATIONS:  The patient is a 47-year-old obese diabetic woman who presents   with volume overload.  She is hypoxic and now requires ventilatory management.    I was asked to place the hemodialysis catheter.  Her morbid obesity makes   placement of any central line difficult.  Due to the location of a previous   internal jugular vein catheter, I think it is essential that I use the right   internal jugular vein despite the presence of the current catheter.  Consent   was obtained from the nurse through the family.    DESCRIPTION OF PROCEDURE:  The patient was placed in a supine position.  The   right neck was prepped after removal of the old sterile dressing.  Using a   sterilely prepped ultrasound probe, the right internal jugular vein was   identified.  I accessed the vein through separate times prior to being able to   hold the needle steady enough to allow passage of the guidewire into the   central venous circulation.  The internal jugular vein and my entrance site   were quite deep, bearing the ultrasound probe in at least MCFP down in   order to visualize the internal jugular vein.    I was, however, eventually able to gain access to the jugular vein and the   guidewire was threaded.  I was looking for PVCs to confirm placement in the   central circulation when it was obvious, the leads had come off her chest.    They were replaced and I visualized one PVC.  Certainly not a guarantee that I   was in the central circulation, but somewhat comforting.  Dilators were   passed over the guidewire and they past with great ease.  The catheter was   placed over the guidewire and each port aspirated and  flushed.  Indwelling   heparin at 1000 units per mL left in each lumen.  The catheter was sutured in   place with 3-0 nylon.  Sterile occlusive dressing was placed after a biopatch.    There were no apparent complications.  A post-procedure chest x-ray will be   checked.       ____________________________________     MD SUE Kendall / STACEY    DD:  02/21/2018 20:55:08  DT:  02/21/2018 23:57:05    D#:  [id number]  Job#:  [id number]

## 2018-02-22 NOTE — DISCHARGE PLANNING
Medical SW    Referral: Sw attended IDT rounds.    Intervention: opens eyes, follows some commands, receiving dialysis, tube feeds, chase in place, not mobilizing,      Plan: Sw to assist w/ d/c planning as needed.

## 2018-02-22 NOTE — PROGRESS NOTES
"Interval History:  47F with extreme obesity (BMI 92), atrial fibrillation, biventricular systolic heart failure, and medical noncompliance admitted for dyspnea and hypoxemia in the setting of recent influenza pneumonia. She has been intubated and is sedated, unable to participate in the interview. Noted to have AFRVR with HRs now improving on therapy. Subsequently she has remained hypoxic and hypercarbic with new acute renal failure and mixed metabolic and respiratory acidosis and shock on high dose vasopressors including norepinephrine, vasopressin and phenylephrine.   2/21: Remains ventilated, persistent lactic acidosis, and progressive leukocytosis and multiorgan system dysfunction. Remains atrial fibrillation with physiologically appropriate rates in the low 100s. CVP climbing now up to 50. Grossly volume overloaded. Urine output nearly anuric.  2/22: Initiated on renal replacement therapy with 3 L ultrafiltration overnight. CVP 35. Blood pressure stable with support. Remains intubated.    Physical Exam   Blood pressure 133/87, pulse 72, temperature 36.6 °C (97.8 °F), resp. rate (!) 9, height 1.651 m (5' 5\"), weight (!) 250 kg (551 lb 2.4 oz), SpO2 97 %.    Constitutional: Extreme obesity. Appears well-developed.   HENT: Normocephalic and atraumatic. No scleral icterus.   Neck: Unable to assess JVD due to body habitus.  Cardiovascular: Tachycardic and irregular, distant heart sounds.rate. Exam reveals no gallop and no friction rub. No murmur heard.   Pulmonary/Chest: Coarse   Abdominal: S/NT/ND    Musculoskeletal:  Pulses present. No atrophy. Unable to assess strength.  Extremities: Exhibits 4+ BLE edema. No clubbing or cyanosis.   Skin: Skin is cool and dry.   Neuro: Non-focal, CN 2-12 unable to assess       ROS: Unable to obtain, patient intubated and sedated unable to communicate effectively.      Intake/Output Summary (Last 24 hours) at 02/22/18 0840  Last data filed at 02/22/18 0600   Gross per 24 hour "   Intake          7199.94 ml   Output             3051 ml   Net          4148.94 ml       Recent Labs      02/20/18   0411  02/21/18   0317  02/22/18   0518   WBC  13.2*  30.6*  15.7*   RBC  4.91  4.64  4.22   HEMOGLOBIN  13.5  13.1  11.5*   HEMATOCRIT  48.2*  45.1  38.8   MCV  98.2*  97.2  91.9   MCH  27.5  28.2  27.3   MCHC  28.0*  29.0*  29.6*   RDW  54.5*  54.4*  50.4*   PLATELETCT  286  223  126*   MPV  10.5  11.1  11.7     Recent Labs      02/20/18   0411  02/21/18 0317 02/22/18   0518   SODIUM  135  137  136   POTASSIUM  5.3  5.1  5.0   CHLORIDE  102  102  100   CO2  29  14*  21   GLUCOSE  106*  150*  178*   BUN  27*  36*  40*   CREATININE  1.86*  2.64*  3.35*   CALCIUM  8.7  7.7*  6.6*     Recent Labs      02/20/18   0411   02/21/18   0317  02/21/18   1925  02/22/18   0123  02/22/18   0518  02/22/18   0730   APTT   --    < >   --   90.9*  90.0*   --   142.6*   INR  1.64*   --   3.07*   --    --   3.48*   --     < > = values in this interval not displayed.     Recent Labs      02/19/18   0855  02/21/18 0317   BNPBTYPENAT  176*  370*     Recent Labs      02/19/18   0855  02/20/18   0411  02/21/18 0317  02/22/18   0518   TROPONINI  <0.01  0.03  2.47*  2.95*   BNPBTYPENAT  176*   --   370*   --      Recent Labs      02/19/18   1525  02/21/18   0317   TRIGLYCERIDE  106  103       No current facility-administered medications on file prior to encounter.      No current outpatient prescriptions on file prior to encounter.       Current Facility-Administered Medications   Medication Dose Frequency Provider Last Rate Last Dose   • phenylephrine (NAY-SYNEPHRINE) 80,000 mcg in D5W 250 mL Infusion  0-300 mcg/min Continuous Olivia Jimenes M.D. 3.8 mL/hr at 02/22/18 0331 20 mcg/min at 02/22/18 0331   • famotidine (PEPCID) tablet 20 mg  20 mg Q EVENING Olivia Jimenes M.D.       • furosemide (LASIX) 100 mg in  mL infusion  5 mg/hr Continuous Richie Agrawal M.D. 5 mL/hr at 02/22/18 0005 5 mg/hr at  02/22/18 0005   • heparin injection 3,000 Units  3,000 Units DIALYSIS PRN Fiona Vasquez M.D.   3,000 Units at 02/21/18 1855   • SODIUM CHLORIDE 0.9 % IV SOLN           • vasopressin (VASOSTRICT) 20 Units in  mL Infusion  0.03 Units/min Continuous Narinder Encarnacion M.D. 9 mL/hr at 02/22/18 0331 0.03 Units/min at 02/22/18 0331   • amiodarone (CORDARONE) 450 mg in D5W 250 mL Infusion  0.5 mg/min Continuous Olivia Jimenes M.D. 17 mL/hr at 02/22/18 0001 0.5 mg/min at 02/22/18 0001   • dexmedetomidine (PRECEDEX) 400 mcg in D5W 100 mL infusion  0.1-1.5 mcg/kg/hr Continuous Olivia Jimenes M.D. 37.5 mL/hr at 02/22/18 0537 0.6 mcg/kg/hr at 02/22/18 0537   • heparin injection 5,000 Units  5,000 Units PRN Richie Agrawal M.D.   Stopped at 02/21/18 1210    And   • heparin infusion 25,000 units in 500 ml 0.45% nacl   Continuous Richie Agrawal M.D.   Stopped at 02/22/18 0810   • sodium bicarbonate 8.4 % 150 mEq in D5W 1,000 mL infusion  150 mEq Continuous Richie Agrawal M.D. 100 mL/hr at 02/22/18 0331 150 mEq at 02/22/18 0331   • norepinephrine (LEVOPHED) 16 mg in  mL Infusion  0-30 mcg/min Continuous Olivia Jimenes M.D.   Stopped at 02/21/18 0333   • fentaNYL (SUBLIMAZE) injection 25 mcg  25 mcg Q HOUR PRN Reece Pratt D.O.        Or   • fentaNYL (SUBLIMAZE) injection 50 mcg  50 mcg Q HOUR PRN Recee Pratt D.O.        Or   • fentaNYL (SUBLIMAZE) injection 100 mcg  100 mcg Q HOUR PRN Reece Pratt D.O.       • ipratropium-albuterol (DUONEB) nebulizer solution 3 mL  3 mL Q2HRS PRN (RT) Reece Pratt D.O.       • ipratropium-albuterol (DUONEB) nebulizer solution 3 mL  3 mL Q4HRS (RT) NAIMA RamirezOSunil   3 mL at 02/22/18 0626   • insulin regular (HUMULIN R) injection 3-14 Units  3-14 Units Q6HRS NAIMA RamirezO.   Stopped at 02/19/18 1200    And   • glucose 4 g chewable tablet 16 g  16 g Q15 MIN PRN Reece Pratt D.O.        And   • dextrose 50% (D50W) injection 25  mL  25 mL Q15 MIN PRN Reece Pratt D.O.       • senna-docusate (PERICOLACE or SENOKOT S) 8.6-50 MG per tablet 2 Tab  2 Tab BID Richie Agrawal M.D.   2 Tab at 02/21/18 2138    And   • polyethylene glycol/lytes (MIRALAX) PACKET 1 Packet  1 Packet QDAY PRN Richie Agrawal M.D.        And   • magnesium hydroxide (MILK OF MAGNESIA) suspension 30 mL  30 mL QDAY PRN Richie Agrawal M.D.        And   • bisacodyl (DULCOLAX) suppository 10 mg  10 mg QDAY PRN Richie Agrawal M.D.       • acetaminophen (TYLENOL) tablet 650 mg  650 mg Q6HRS PRN Richie Agrawal M.D.       • NS (BOLUS) infusion 500 mL  500 mL Once PRN Richie Agrawal M.D.       • ampicillin/sulbactam (UNASYN) 3 g in  mL IVPB  3 g Q6HRS Richie Agrawal M.D.   Stopped at 02/22/18 0214   • azithromycin (ZITHROMAX) tablet 250 mg  250 mg Q24HRS Richie Agrawal M.D.   250 mg at 02/21/18 0915   • ondansetron (ZOFRAN) syringe/vial injection 4 mg  4 mg Q4HRS PRN Richie Agrawal M.D.       • ondansetron (ZOFRAN ODT) dispertab 4 mg  4 mg Q4HRS PRN Richie Agrawal M.D.       • promethazine (PHENERGAN) tablet 12.5-25 mg  12.5-25 mg Q4HRS PRN Richie Agrawal M.D.       • promethazine (PHENERGAN) suppository 12.5-25 mg  12.5-25 mg Q4HRS PRN Richie Agrawal M.D.       • prochlorperazine (COMPAZINE) injection 5-10 mg  5-10 mg Q4HRS PRN Richie Agrawal M.D.       • chlorhexidine (PERIDEX) 0.12 % solution 15 mL  15 mL BID NAIMA RamirezOSunil   15 mL at 02/21/18 2138   • lidocaine (XYLOCAINE) 1%  injection  1-2 mL Q30 MIN PRN Reece Pratt D.O.       • tobramycin (TOBREX) 0.3 % ophthalmic solution 1 Drop  1 Drop Q4HRS Reece Pratt D.O.   Stopped at 02/22/18 0541   • Respiratory Care per Protocol   Continuous RT Richie Agrawal M.D.         Last reviewed on 2/19/2018  3:59 PM by Ryan Correa R.N.  Medications reviewed    Imaging reviewed    EKG (2/19/2018):  I have personally reviewed the  EKG this visit and discussed with the patient. It shows , low voltage.     ECHO CONCLUSIONS (2/19/2018):  No prior study is available for comparison.   Poor visualization of all cardiac structures due to body habitus   despite contrast use.  Severely reduced left ventricular systolic function by gross   evaluation.  Flattened septum in systole and diastole consistent with RV pressure   and volume overload.  Severely dilated right ventricle.  Reduced right ventricular systolic function.  Valves not seen.       Impressions:  1. Hypoxic and hypercarbic respiratory failure  2. Mixed cardiogenic and possibly obstructive shock  3. Lactic acidosis  4. Extreme obesity (BMI 92)  5. Severe biventricular systolic dysfunction  6. Acutely decompensated congestive heart failure, CVP 36  7. JAYSON, anuric now on new renal replacement therapy  8. Possible pulmonary embolism  9. Systemic anticoagulation with heparin  10. Possible aspiration pneumonia  11. Atrial fibrillation  12. Incomplete database  13. Documented medical noncompliance  14. Hepatocellular injury  15. MultiOrgan system failure     Recommendations:  1. Agree with continued renal replacement therapy for metabolic acidosis and volume management. Recommend aggressive ultrafiltration as tolerated hemodynamically.  2. Continue vasopressor and inotropic support  3. IV heparin gtt ongoing   4. No BB, ACEI due to renal and hemodynamic compromise.  5. HR acceptable for degree of hemodynamic impairment. Amiodarone therapy suboptimal due to long duration of AF and no prior anticoagulation, however not able to tolerate other rate modifying mediations at this time.  6. Should HRs increase, recommend judicious digoxin administration with levels given JAYSON.         Thank you for this interesting consultation. It was my pleasure to see Davina Franco today.     Josh Montano MD, FACC, [facility]  Division of Interventional Cardiology  [facility]

## 2018-02-22 NOTE — CARE PLAN
Problem: Ventilation Defect:  Goal: Ability to achieve and maintain unassisted ventilation or tolerate decreased levels of ventilator support  Outcome: PROGRESSING SLOWER THAN EXPECTED  Adult Ventilation Update    Total Vent Days: 2    Patient Lines/Drains/Airways Status    Active Airway     Name: Placement date: Placement time: Site: Days:    Airway Group ET Tube Oral 8.0 02/19/18   1116   Oral   2                    Plateau Pressure (Q Shift): 33 (02/20/18 0628)  Static Compliance (ml / cm H2O): 59.7 (02/21/18 1415)    Patient failed trials because of Barriers to Wean: FiO2 >60% or PEEP >10 CM H2O (02/20/18 0628)  Barriers to SBT    Length of Weaning Trial    Cough: Productive (02/21/18 1415)  Sputum Amount: Moderate (02/21/18 1415)  Sputum Color: Yellow;Tan (02/21/18 1415)  Sputum Consistency: Thick (02/21/18 1415)    Mobility Group  Activity Performed: Unable to mobilize (02/20/18 2000)  Reason Not Mobilized: Unstable condition (02/20/18 2000)  Mobilization Comments: PEEP 12, while turned on day shift BP dropped  (02/20/18 2000)    Events/Summary/Plan: Vent check (02/21/18 1415)

## 2018-02-22 NOTE — PROGRESS NOTES
Marita from Lab called with critical result of Lactic Acid 5.9 at 0148. Critical lab result read back to Marita.   Dr. Castellanos notified of critical lab result at 0150.  Critical lab result read back by Dr. Castellanos. No new orders at this time.

## 2018-02-22 NOTE — PROGRESS NOTES
Fair amount of tan secretions noted while suctioning.  Checked residuals on tube feed.  450ml of straight tube feed pulled.  Dr. Jimenes notified and verbal order to hold tube feeds for the time being received.

## 2018-02-22 NOTE — PROGRESS NOTES
Gabbie from Lab called with critical result of Lactic acid 6.1 at 2228. Critical lab result read back to Gabbie.     Dr. Castellanos notified of critical lab result at 2300.  Critical lab result read back by Dr. Castellanos. No new orders received at this time.

## 2018-02-22 NOTE — CARE PLAN
Problem: Ventilation Defect:  Goal: Ability to achieve and maintain unassisted ventilation or tolerate decreased levels of ventilator support  Outcome: PROGRESSING AS EXPECTED  Adult Ventilation Update    Total Vent Days: 4  APRV: PHigh: 28 PLow: 0 THigh: 4.5 TLow:     Patient Lines/Drains/Airways Status    Active Airway     Name: Placement date: Placement time: Site: Days:    Airway Group ET Tube Oral 8.0 02/19/18   1116   Oral   4              Plateau Pressure (Q Shift): 33 (02/20/18 0628)  Static Compliance (ml / cm H2O): 33.1 (02/22/18 1453)    Sputum/Suction   Cough: Non Productive (02/22/18 1453)  Sputum Amount: Unable to Evaluate (02/22/18 1453)  Sputum Color: Unable to Evaluate (02/22/18 1453)  Sputum Consistency: Unable to Evaluate (02/22/18 1453)    Events/Summary/Plan: ABG drawn. No vent changes made at this time

## 2018-02-22 NOTE — PROGRESS NOTES
Nephrology Progress Note, Adult, Complex               Author: Fiona Pelayomariahanson Date & Time created: 2/22/2018  3:17 PM     Interval History:  48 y/o female with multiple medical problems, morbidly obese  In JAYSON, septic shock/anuric  On two pressors  On daily dialysis    Review of Systems:  Review of Systems   Reason unable to perform ROS: intubated.       Physical Exam:  Physical Exam   Constitutional: She appears well-developed. No distress.   morbidly   HENT:   Head: Normocephalic and atraumatic.   ETT   Eyes: Conjunctivae are normal. Pupils are equal, round, and reactive to light. No scleral icterus.   Cardiovascular: Normal rate and regular rhythm.  Exam reveals no gallop and no friction rub.    Pulmonary/Chest: She has no wheezes. She has rales.   Coarse BS  vented   Abdominal: Soft. Bowel sounds are normal. She exhibits distension.   Musculoskeletal: She exhibits edema.   Anasarca   Neurological:   sedated   Nursing note and vitals reviewed.      Labs:  Recent Labs      02/21/18 0414  02/22/18   0444  02/22/18   1013   ISTATAPH  7.182*  7.439  7.386*   ISTATAPCO2  31.7  32.0  36.1   ISTATAPO2  58*  65  85   ISTATATCO2  13*  23  23   [id number]  83*  93  96   ISTATARTHCO3  11.9*  21.7  21.7   ISTATARTBE  -15*  -2  -3   ISTATTEMP  96.8 F  97.9 F  97.8 F   ISTATFIO2  90  100  100   ISTATSPEC  Arterial  Arterial  Arterial   ISTATAPHTC  7.196*  7.445  7.393*   KHYVHALH3HT  55*  63*  83     Recent Labs      02/20/18   0411  02/21/18   0317  02/22/18   0518   TROPONINI  0.03  2.47*  2.95*   BNPBTYPENAT   --   370*   --      Recent Labs      02/20/18   0411  02/21/18   0317  02/22/18   0518   SODIUM  135  137  136   POTASSIUM  5.3  5.1  5.0   CHLORIDE  102  102  100   CO2  29  14*  21   BUN  27*  36*  40*   CREATININE  1.86*  2.64*  3.35*   MAGNESIUM  2.1  1.9  1.9   PHOSPHORUS  7.8*  8.2*  6.3*   CALCIUM  8.7  7.7*  6.6*     Recent Labs      02/20/18   0411  02/21/18   0317  02/22/18   0518   GLUCOSE  106*  150*  178*      Recent Labs      18   0123  18   0518  18   0730   RBC  4.91   --   4.64   --    --   4.22   --    HEMOGLOBIN  13.5   --   13.1   --    --   11.5*   --    HEMATOCRIT  48.2*   --   45.1   --    --   38.8   --    PLATELETCT  286   --   223   --    --   126*   --    PROTHROMBTM  19.1*   --   31.4*   --    --   34.7*   --    APTT   --    < >   --   90.9*  90.0*   --   142.6*   INR  1.64*   --   3.07*   --    --   3.48*   --     < > = values in this interval not displayed.     Recent Labs      18   0518   WBC  13.2*  30.6*  15.7*   NEUTSPOLYS  84.90*  88.90*  87.60*   LYMPHOCYTES  5.10*  4.50*  9.70*   MONOCYTES  9.30  5.60  2.70   EOSINOPHILS  0.00  0.00  0.00   BASOPHILS  0.20  0.10  0.00           Hemodynamics:  Temp (24hrs), Av.4 °C (97.6 °F), Min:36.2 °C (97.2 °F), Max:36.7 °C (98.1 °F)  Temperature: 36.7 °C (98.1 °F)  Pulse  Av.1  Min: 32  Max: 135Heart Rate (Monitored): (!) 109  Arterial BP: 111/72, NIBP: 114/70  CVP (mm Hg): (!) 35 MM HG  Respiratory:  Schreiber Vent Mode: APRV, FiO2: 90, P Peak (PIP): 31, P MEAN: 26 Respiration: (!) 11, Pulse Oximetry: 98 %     Work Of Breathing / Effort: Vented  RUL Breath Sounds: Clear, RML Breath Sounds: Clear, RLL Breath Sounds: Diminished, TEJAS Breath Sounds: Clear, LLL Breath Sounds: Diminished  Fluids:    Intake/Output Summary (Last 24 hours) at 18 1517  Last data filed at 18 1400   Gross per 24 hour   Intake           8248.6 ml   Output             7001 ml   Net           1247.6 ml        GI/Nutrition:  Orders Placed This Encounter   Procedures   • Diet NPO     Standing Status:   Standing     Number of Occurrences:   1     Order Specific Question:   Type:     Answer:   Now [1]     Order Specific Question:   Restrict to:     Answer:   Strict [1]     Medical Decision Making, by Problem:  Active Hospital Problems    Diagnosis   • Acute kidney  injury (CMS-HCC) [N17.9]   • Shock (CMS-HCC) [R57.9]   • Cardiomyopathy (CMS-HCC) [I42.9]   • Chronic atrial fibrillation (CMS-HCC) [I48.2]   • Sepsis (CMS-HCC) [A41.9]   • Metabolic acidosis [E87.2]   • Acute respiratory failure (CMS-HCC) [J96.00]   • Morbid obesity (CMS-[facility]) [E66.01]   • PNA (pneumonia) [J18.9]   • Cor pulmonale (CMS-HCC) [I27.81]   • Anasarca [R60.1]   • Acute metabolic encephalopathy [G93.41]   • Elevated LFTs [R79.89]       Quality-Core Measures   Reviewed items::  Labs reviewed and Medications reviewed    Assessment and plan  1.JAYSON/ATN/HD -on daily HD  2.Hypotension /shock -on 2 pressors  3.Electrolytes: elevated phos -to monitor  4.Anemia: Hb WNL  5.Metabolic acidosis -corrected with HD  6.Volume:UF goal 3-4 L    Recs: daily dialysis             Daily BMP             Keep MAP> 65  D/w Dr Jimenes

## 2018-02-22 NOTE — PROGRESS NOTES
HD treatment # 1 today using newly placed RIJ temp CVC.On 2 pressor support,titrated throughout treatment-sbp mostly in the 90's.Net UF removed 3 L Report given to primary Rn..Waited 2 H on Xray Dept to perform-read placement verification of CVC.Had to call them twice to follow up on result.

## 2018-02-22 NOTE — CONSULTS
DATE OF SERVICE:  02/21/2018    DATE OF SERVICE:  02/21/2018    REQUESTING PHYSICIAN: Richie Agrawal MD.    REASON FOR CONSULTATION:  To evaluate patient with acute kidney injury,   anuria, and metabolic acidosis.    HISTORY OF PRESENT ILLNESS:  The patient is 47-year-old female with   complicated past medical history with pulmonary hypertension, severe obesity,   atrial fibrillation, baseline creatinine level at 1.0.  Her hospital course   was complicated with acute kidney injury with creatinine level up to 2.64,   metabolic acidosis 14 secondary to septic shock, lactic acid up to 9.9.    Currently, patient on 3 pressors, intubated on mechanical ventilation.  Urine   output over past 24 hours 55 mL.    REVIEW OF SYSTEMS:  Patient is intubated, not possible to obtain.    PAST MEDICAL HISTORY:  Atrial fibrillation, upper respiratory tract infection,   and morbid obesity.    PAST SURGICAL HISTORY:  Not able to obtain.    SOCIAL HISTORY:  As well not possible to obtain.  Patient is intubated.    ALLERGIES:  No known drug allergies.    OUTPATIENT MEDICATIONS:  Reviewed.    FAMILY HISTORY:  Also unable to obtain as patient is intubated.    PHYSICAL EXAMINATION:  VITAL SIGNS:  Blood pressure 98/63, heart rate 89.  GENERAL APPEARANCE:  Well-developed, morbidly obese female, intubated on   mechanical ventilation, on 3 pressors, in critical condition.  HEENT:  Normocephalic, atraumatic.  Pupils equal, round, reactive to light.    Endotracheal tube in place.  NECK:  No thyromegaly, no lymphadenopathy appreciated.  LUNGS:  Coarse breath sounds bilaterally.  Decreased breath sounds at bases.    No rhonchi.  HEART:  Regular rate.  No rub or gallop.  ABDOMEN:  Soft, nondistended, obese.  Bowel sounds present.  EXTREMITIES:  Large edema 3+.  No cyanosis.  NEUROLOGIC:  Patient is sedated.    LABORATORY RESULTS:  White blood count 3.6, hemoglobin 13.1.  Sodium 137,   potassium 5.1, CO2 14, BUN 36 and creatinine level  2.64.    ASSESSMENT AND PLAN:  The patient is a 47-year-old female with acute kidney   injury secondary to severe sepsis, renal hypoperfusion likely an acute tubular   injury, intubated on mechanical ventilation, on 3 pressors.    PROBLEMS:  1.  Acute kidney injury.  This patient is anuric.  We will start renal   replacement therapy today, continue daily.  2.  Electrolytes remain well controlled, to monitor.  3.  Metabolic acidosis will be correcting with dialysis.  4.  Volume.  We will attempt ultrafiltration as blood pressure tolerates.    RECOMMENDATIONS:  Daily dialysis, daily monitoring of basic metabolic panel,   keep mean arterial pressure above 65.    We will follow the patient closely.    Thank you for the consult.  The above plan was discussed with Dr. Richie Agrawal.       ____________________________________     MD FERNANDO MOFFETT / STACEY    DD:  02/21/2018 17:11:17  DT:  02/21/2018 23:42:10    D#:  [id number]  Job#:  [id number]

## 2018-02-22 NOTE — RESPIRATORY CARE
COPD EDUCATION by COPD CLINICAL EDUCATOR  2/22/2018 at 7:44 AM by Davina Garcia     Patient reviewed by COPD education team. Patient does not qualify for COPD program at this time

## 2018-02-22 NOTE — DISCHARGE PLANNING
Courtesy Note- Outpatient Dialysis    On standby for outpatient dialysis placement needs should they arise. Please call if needed.    Nita Lim- Dialysis Coordinator  [facility] ph# [phone]

## 2018-02-22 NOTE — PROGRESS NOTES
[facility] Hospitalist Progress Note    Date of Service: 2018    Chief Complaint  47 y.o. female admitted 2018 with Afib anasarca and acute respiratory failure, hx of non compliance with meds    Interval Problem Update  Anuric   3 L off with hemodialysis   On Drake-Synephrine 100 and vasopressin  Tube feeding at goal      Consultants/Specialty  Dr Jimenes   cardiology    Disposition  ICU        Review of Systems   Unable to perform ROS: Intubated      Physical Exam  Laboratory/Imaging   Hemodynamics  Temp (24hrs), Av.4 °C (97.6 °F), Min:36.2 °C (97.2 °F), Max:36.7 °C (98.1 °F)   Temperature: 36.7 °C (98.1 °F)  Pulse  Av.1  Min: 32  Max: 135 Heart Rate (Monitored): (!) 113  Arterial BP: 111/72, NIBP: 114/70 CVP (mm Hg): (!) 35 MM HG    Respiratory  Schreiber Vent Mode: APRV, FiO2: 90, P Peak (PIP): 33, P MEAN: 27   Respiration: (!) 11, Pulse Oximetry: 100 %     Work Of Breathing / Effort: Vented  RUL Breath Sounds: Clear, RML Breath Sounds: Diminished, RLL Breath Sounds: Diminished, TEJAS Breath Sounds: Clear, LLL Breath Sounds: Diminished    Fluids    Intake/Output Summary (Last 24 hours) at 18 1449  Last data filed at 18 1400   Gross per 24 hour   Intake           8248.6 ml   Output             7001 ml   Net           1247.6 ml       Nutrition  Orders Placed This Encounter   Procedures   • Diet NPO     Standing Status:   Standing     Number of Occurrences:   1     Order Specific Question:   Type:     Answer:   Now [1]     Order Specific Question:   Restrict to:     Answer:   Strict [1]     Physical Exam   Constitutional: She appears well-developed.   Morbidly obese   HENT:   Head: Normocephalic and atraumatic.   Right Ear: External ear normal.   Left Ear: External ear normal.   Mouth/Throat: No oropharyngeal exudate.   Eyes: Right eye exhibits no discharge. Left eye exhibits no discharge. No scleral icterus.   Neck: Neck supple. No JVD present. No tracheal deviation present.   Cardiovascular:  An irregularly irregular rhythm present. Tachycardia present.  Exam reveals no friction rub.    No murmur heard.  Pulmonary/Chest: No respiratory distress. She has decreased breath sounds. She has rhonchi. She has rales. She exhibits no tenderness.   Intubated   Abdominal: Soft. Bowel sounds are normal. She exhibits no distension. There is no tenderness. There is no rebound and no guarding.   Obese   Musculoskeletal: She exhibits edema (3+ generalized ). She exhibits no tenderness.   Neurological: No cranial nerve deficit. She exhibits normal muscle tone.   Sedated   Skin: Skin is warm and dry. She is not diaphoretic. No cyanosis or erythema. Nails show no clubbing.   Psychiatric:   sedated   Nursing note and vitals reviewed.      Recent Labs      02/20/18 0411 02/21/18 0317 02/22/18 0518   WBC  13.2*  30.6*  15.7*   RBC  4.91  4.64  4.22   HEMOGLOBIN  13.5  13.1  11.5*   HEMATOCRIT  48.2*  45.1  38.8   MCV  98.2*  97.2  91.9   MCH  27.5  28.2  27.3   MCHC  28.0*  29.0*  29.6*   RDW  54.5*  54.4*  50.4*   PLATELETCT  286  223  126*   MPV  10.5  11.1  11.7     Recent Labs      02/20/18 0411 02/21/18 0317 02/22/18   0518   SODIUM  135  137  136   POTASSIUM  5.3  5.1  5.0   CHLORIDE  102  102  100   CO2  29  14*  21   GLUCOSE  106*  150*  178*   BUN  27*  36*  40*   CREATININE  1.86*  2.64*  3.35*   CALCIUM  8.7  7.7*  6.6*     Recent Labs      02/20/18   0411   02/21/18 0317  02/21/18   1925  02/22/18   0123  02/22/18   0518  02/22/18   0730   APTT   --    < >   --   90.9*  90.0*   --   142.6*   INR  1.64*   --   3.07*   --    --   3.48*   --     < > = values in this interval not displayed.     Recent Labs      02/21/18 0317   BNPBTYPENAT  370*     Recent Labs      02/19/18   1525  02/21/18   0317   TRIGLYCERIDE  106  103          Assessment/Plan     Acute kidney injury (CMS-HCC)   Assessment & Plan    Likely ATN secondary to sepsis and shock  Remains in uric   Continue hemodialysis discussed with  nephrology   DC Lasix and bicarbonate drips           Metabolic acidosis   Assessment & Plan    Acidosis management with dialysis        Sepsis (CMS-HCC)- (present on admission)   Assessment & Plan    This is severe sepsis with the following associated acute organ dysfunction(s): acute kidney failure.metabolic encephalopathy    Continue current antibiotics and pressors        Chronic atrial fibrillation (CMS-HCC)   Assessment & Plan    Continue Amiodarone drip and continue   Anticoagulation with heparin        Cardiomyopathy (CMS-HCC)   Assessment & Plan    EF20%  Given shock and renal failure no BB or ACEI  Discussed with  Dr sandoval          Shock (CMS-HCC)   Assessment & Plan    Likely cardiogenic and possibly septic  Continue  vasopressin and norepi and stoney off as tolerated  Check cortisol level          Elevated LFTs   Assessment & Plan    US consistent with steatosis and also likely some component of hepatic congestion  INR trending up        Acute metabolic encephalopathy   Assessment & Plan    Wean off sedation as tolerated        Anasarca   Assessment & Plan    Did not respond to diuresis  Fluid management with hemodialysis        Cor pulmonale (CMS-HCC)   Assessment & Plan    Heparin drip for Afib and also possible PE given RV strain on echo   patient unable to tolerate CT of chest due to renal failure and critical illness        PNA (pneumonia)   Assessment & Plan    Continue unasyn azithromycin  Sputum Cultures negative follow-up on final results  Blood culture 1 out of 2 coag-negative staph likely contaminant        Morbid obesity (CMS-HCC)   Assessment & Plan    Body mass index is 91.72 kg/m².         Acute respiratory failure (CMS-HCC)   Assessment & Plan    Vent management per critical care discussed with Dr Jalil Petty with high vent support requirement            Quality-Core Measures   Reviewed items::  Labs reviewed, Medications reviewed and Radiology images reviewed  Crooks catheter::   Critically Ill - Requiring Accurate Measurement of Urinary Output  Central line in place:  Vasopressors  DVT prophylaxis pharmacological::  Heparin  Antibiotics:  Treating active infection/contamination beyond 24 hours perioperative coverage         Overall prognosis is guarded discussed with palliative care patient remains full code

## 2018-02-23 PROBLEM — K56.7 ILEUS (HCC): Status: ACTIVE | Noted: 2018-01-01

## 2018-02-23 NOTE — CARE PLAN
Problem: Ventilation Defect:  Goal: Ability to achieve and maintain unassisted ventilation or tolerate decreased levels of ventilator support  Outcome: PROGRESSING AS EXPECTED  Adult Ventilation Update    Total Vent Days: 5  APRV: PHigh: 28 PLow: 0 THigh: 4.5 TLow: 0.5    Patient Lines/Drains/Airways Status    Active Airway     Name: Placement date: Placement time: Site: Days:    Airway Group ET Tube Oral 8.0 02/19/18   1116   Oral   5               Plateau Pressure (Q Shift): 33 (02/20/18 0628)  Static Compliance (ml / cm H2O): 46.2 (02/23/18 1425)    Sputum/Suction   Cough: Non Productive (02/23/18 1425)  Sputum Amount: Unable to Evaluate (02/23/18 1425)  Sputum Color: Unable to Evaluate (02/23/18 1425)  Sputum Consistency: Unable to Evaluate (02/23/18 1425)    Events/Summary/Plan: No vent changes made at this time (02/23/18 1425)

## 2018-02-23 NOTE — DISCHARGE PLANNING
Medical SW    Referral: Sw attended IDT rounds.    Intervention: not following commands, afib, tube feeds starting on trickle feed, daily dialysis, no mobility,       Plan: Sw to assist w/ d/c planning as needed.

## 2018-02-23 NOTE — CARE PLAN
Problem: Safety  Goal: Will remain free from falls  Pt sedated on vent.  Strip alarm in place.    Problem: Psychosocial Needs:  Goal: Level of anxiety will decrease  Palliative care and social work involved with case.  Palliative reaching out to family

## 2018-02-23 NOTE — PROGRESS NOTES
HD Tx per Dr. FRANCO Vasquez x 3 hrs., initiated at 1143, ended at 1443, net UF 4500 ml. See paper flow-sheet for details.    Report given to MIHAELA Gallegos RN.

## 2018-02-23 NOTE — PROGRESS NOTES
[facility] Hospitalist Progress Note    Date of Service: 2018    Chief Complaint  47 y.o. female admitted 2018 with Afib anasarca and acute respiratory failure, hx of non compliance with meds    Interval Problem Update  Remains Anuric  TF on hold given large residual  Increased pressor requirements  LFTs are worse      Consultants/Specialty  Dr Jimenes   cardiology    Disposition  ICU        Review of Systems   Unable to perform ROS: Intubated      Physical Exam  Laboratory/Imaging   Hemodynamics  Temp (24hrs), Av.9 °C (98.5 °F), Min:36.7 °C (98.1 °F), Max:37.4 °C (99.3 °F)   Temperature: 36.8 °C (98.2 °F)  Pulse  Av.1  Min: 32  Max: 135 Heart Rate (Monitored): (!) 124  Arterial BP: (!) 98/72 CVP (mm Hg): (!) 51 MM HG    Respiratory  Scrheiber Vent Mode: APRV, FiO2: 90, P Peak (PIP): 33, P MEAN: 27   Respiration: (!) 27, Pulse Oximetry: 96 %     Work Of Breathing / Effort: Vented  RUL Breath Sounds: Crackles, RML Breath Sounds: Diminished, RLL Breath Sounds: Diminished, TEJAS Breath Sounds: Crackles, LLL Breath Sounds: Diminished    Fluids    Intake/Output Summary (Last 24 hours) at 18 1453  Last data filed at 18 0600   Gross per 24 hour   Intake           1870.8 ml   Output               60 ml   Net           1810.8 ml       Nutrition  Orders Placed This Encounter   Procedures   • Diet NPO     Standing Status:   Standing     Number of Occurrences:   1     Order Specific Question:   Type:     Answer:   Now [1]     Order Specific Question:   Restrict to:     Answer:   Strict [1]     Physical Exam   Constitutional: She appears well-developed.   Morbidly obese   HENT:   Head: Normocephalic and atraumatic.   Right Ear: External ear normal.   Left Ear: External ear normal.   Mouth/Throat: Oropharynx is clear and moist.   Eyes: Right eye exhibits no discharge. Left eye exhibits no discharge. Scleral icterus is present.   Neck: Neck supple. No JVD present. No tracheal deviation present.    Cardiovascular: An irregularly irregular rhythm present. Tachycardia present.  Exam reveals no friction rub.    No murmur heard.  Pulmonary/Chest: No stridor. No respiratory distress. She has decreased breath sounds. She has rhonchi. She has rales. She exhibits no tenderness.   Intubated   Abdominal: Soft. Bowel sounds are normal. She exhibits no distension. There is no tenderness. There is no rebound and no guarding.   Obese   Musculoskeletal: She exhibits edema (severe generalized). She exhibits no tenderness.   Neurological: No cranial nerve deficit. She exhibits normal muscle tone.   Sedated   Skin: Skin is warm and dry. She is not diaphoretic. No cyanosis or erythema. Nails show no clubbing.   Psychiatric:   sedated   Nursing note and vitals reviewed.      Recent Labs      02/21/18 0317 02/22/18 0518 02/23/18   0320   WBC  30.6*  15.7*  16.2*   RBC  4.64  4.22  4.40   HEMOGLOBIN  13.1  11.5*  12.1   HEMATOCRIT  45.1  38.8  40.5   MCV  97.2  91.9  92.0   MCH  28.2  27.3  27.5   MCHC  29.0*  29.6*  29.9*   RDW  54.4*  50.4*  52.2*   PLATELETCT  223  126*  114*   MPV  11.1  11.7  12.1     Recent Labs      02/21/18 0317 02/22/18   0518  02/23/18   0526   SODIUM  137  136  132*   POTASSIUM  5.1  5.0  5.4   CHLORIDE  102  100  100   CO2  14*  21  17*   GLUCOSE  150*  178*  113*   BUN  36*  40*  52*   CREATININE  2.64*  3.35*  4.43*   CALCIUM  7.7*  6.6*  6.2*     Recent Labs      02/21/18 0317 02/22/18   0518   02/23/18   0320  02/23/18   0630  02/23/18   1300   APTT   --    < >   --    < >  91.0*  102.4*  111.4*   INR  3.07*   --   3.48*   --   3.18*   --    --     < > = values in this interval not displayed.     Recent Labs      02/21/18 0317   BNPBTYPENAT  370*     Recent Labs      02/21/18 0317   TRIGLYCERIDE  103          Assessment/Plan     Ileus (CMS-HCC)   Assessment & Plan    Hold tube feeding  NG to suction  Start Reglan  Check abdominal x-ray        Acute kidney injury (CMS-HCC)    Assessment & Plan    Likely ATN secondary to sepsis and shock  Pt is anuric  Continue hemodialysis as tolerated discussed with Dr pérez            Metabolic acidosis   Assessment & Plan    Continue hemodialysis        Sepsis (CMS-HCC)- (present on admission)   Assessment & Plan    This is severe sepsis with the following associated acute organ dysfunction(s): acute kidney failure.metabolic encephalopathy    Continue current antibiotics and pressors        Chronic atrial fibrillation (CMS-HCC)   Assessment & Plan    Continue Amiodarone and heparin drips        Cardiomyopathy (CMS-HCC)   Assessment & Plan    EF20%  Given shock and renal failure no BB or ACEI  Discussed with  Dr Montano, fluid management with hemodialysis, starting Flolan          Shock (CMS-HCC)   Assessment & Plan    Likely cardiogenic and possibly septic  On Drake-Synephrine vasopressin and norepi   Wean off as tolerated   cortisol level 25.2          Elevated LFTs   Assessment & Plan    With liver failure likely secondary to sepsis and hepatic congestion   US consistent with steatosis , hepatitis serology was negative   Monitor LFTs, check ammonia        Acute metabolic encephalopathy   Assessment & Plan    Wean off sedation as tolerated        Anasarca   Assessment & Plan    Did not respond to diuresis  Fluid management with hemodialysis        Cor pulmonale (CMS-HCC)   Assessment & Plan    Heparin drip for Afib and also possible PE given RV strain on echo   patient unable to tolerate CT of chest due to renal failure and critical illness  Started on flolan        PNA (pneumonia)   Assessment & Plan    unasyn azithromycin  Sputum Cultures negative   Blood culture 1 out of 2 coag-negative staph likely contaminant        Morbid obesity (CMS-HCC)   Assessment & Plan    Body mass index is 91.72 kg/m².         Acute respiratory failure (CMS-HCC)   Assessment & Plan    Vent management per critical care discussed with Dr Jimenes starting flolan  Still  with high vent support requirement              Quality-Core Measures   Reviewed items::  Labs reviewed, Medications reviewed and Radiology images reviewed  Crooks catheter::  Critically Ill - Requiring Accurate Measurement of Urinary Output  Central line in place:  Vasopressors  DVT prophylaxis pharmacological::  Heparin  Antibiotics:  Treating active infection/contamination beyond 24 hours perioperative coverage      Patient with multisystem organ failure with very guarded prognosis unlikely to have meaningful recovery from this hospitalization, I met with the patient's  her brother and her son review test results and treatment options, they understand the gravity of her illness, the patient's daughter is on her way to visit her mom and they would like to continue current level of care until she arrives

## 2018-02-23 NOTE — HEART FAILURE PROGRAM
Cardiovascular Nurse Navigator () Progress Note:      Please note this is an acutely decompensated HF pt, Cardiology following.      Outpatient cardiology: no encounters per chart review  Residence: [facility]  Insurance coverage: Medicaid FFS     As of 2/22, pt remains on ventilator, dispo undetermined. Therefore, scheduling of 7 day HF appt and patient education deferred at this time.     Will monitor for improvement in clinical status and evolving dispo plan.       Thank you and please call YAZMIN Hsieh with any questions: [phone].

## 2018-02-23 NOTE — PROGRESS NOTES
"Interval History:  47F with extreme obesity (BMI 92), atrial fibrillation, biventricular systolic heart failure, and medical noncompliance admitted for dyspnea and hypoxemia in the setting of recent influenza pneumonia. She has been intubated and is sedated, unable to participate in the interview. Noted to have AFRVR with HRs now improving on therapy. Subsequently she has remained hypoxic and hypercarbic with new acute renal failure and mixed metabolic and respiratory acidosis and shock on high dose vasopressors including norepinephrine, vasopressin and phenylephrine.   2/21: Remains ventilated, persistent lactic acidosis, and progressive leukocytosis and multiorgan system dysfunction. Remains atrial fibrillation with physiologically appropriate rates in the low 100s. CVP climbing now up to 50. Grossly volume overloaded. Urine output nearly anuric.  2/22: Initiated on renal replacement therapy with 3 L ultrafiltration overnight. CVP 35. Blood pressure stable with support. Remains intubated.  2/23: AF overnight, 3L UF HDD yesterday. Ongoing shock. Hepatocellular injury, worse.    Physical Exam   Blood pressure 133/87, pulse 70, temperature 36.9 °C (98.4 °F), resp. rate (!) 22, height 1.651 m (5' 5\"), weight (!) 250 kg (551 lb 2.4 oz), SpO2 97 %.    Constitutional: Extreme obesity. Appears well-developed.   HENT: Normocephalic and atraumatic. No scleral icterus.   Neck: Unable to assess JVD due to body habitus.  Cardiovascular: Tachycardic and irregular, distant heart sounds.rate. Exam reveals no gallop and no friction rub. No murmur heard.   Pulmonary/Chest: Coarse   Abdominal: S/NT/ND    Musculoskeletal:  Pulses present. No atrophy. Unable to assess strength.  Extremities: Exhibits 4+ BLE edema. No clubbing or cyanosis.   Skin: Skin is cool and dry.   Neuro: Non-focal, CN 2-12 unable to assess       ROS: Unable to obtain, patient intubated and sedated unable to communicate effectively.      Intake/Output Summary " (Last 24 hours) at 02/23/18 0918  Last data filed at 02/23/18 0600   Gross per 24 hour   Intake          3680.24 ml   Output             4010 ml   Net          -329.76 ml       Recent Labs      02/21/18 0317 02/22/18   0518  02/23/18   0320   WBC  30.6*  15.7*  16.2*   RBC  4.64  4.22  4.40   HEMOGLOBIN  13.1  11.5*  12.1   HEMATOCRIT  45.1  38.8  40.5   MCV  97.2  91.9  92.0   MCH  28.2  27.3  27.5   MCHC  29.0*  29.6*  29.9*   RDW  54.4*  50.4*  52.2*   PLATELETCT  223  126*  114*   MPV  11.1  11.7  12.1     Recent Labs      02/21/18 0317 02/22/18   0518  02/23/18   0526   SODIUM  137  136  132*   POTASSIUM  5.1  5.0  5.4   CHLORIDE  102  100  100   CO2  14*  21  17*   GLUCOSE  150*  178*  113*   BUN  36*  40*  52*   CREATININE  2.64*  3.35*  4.43*   CALCIUM  7.7*  6.6*  6.2*     Recent Labs      02/21/18 0317 02/22/18   0518   02/22/18   2338  02/23/18   0320  02/23/18   0630   APTT   --    < >   --    < >  109.1*  91.0*  102.4*   INR  3.07*   --   3.48*   --    --   3.18*   --     < > = values in this interval not displayed.     Recent Labs      02/21/18 0317   BNPBTYPENAT  370*     Recent Labs      02/21/18 0317 02/22/18   0518  02/23/18   0320   TROPONINI  2.47*  2.95*  2.50*   BNPBTYPENAT  370*   --    --      Recent Labs      02/21/18   0317   TRIGLYCERIDE  103       No current facility-administered medications on file prior to encounter.      No current outpatient prescriptions on file prior to encounter.       Current Facility-Administered Medications   Medication Dose Frequency Provider Last Rate Last Dose   • ampicillin/sulbactam (UNASYN) 3 g in  mL IVPB  3 g Q24HRS Olivia Jimenes M.D.       • metoclopramide (REGLAN) injection 10 mg  10 mg Q6HRS Richie Agrawal M.D.       • phenylephrine (NAY-SYNEPHRINE) 80,000 mcg in D5W 250 mL Infusion  0-300 mcg/min Continuous Olivia Jimenes M.D. 9.4 mL/hr at 02/22/18 1952 50 mcg/min at 02/22/18 1952   • famotidine (PEPCID) tablet 20 mg   20 mg Q EVENING Olivia Jimenes M.D.   20 mg at 02/22/18 2036   • heparin injection 1,750 Units  1,750 Units DIALYSIS PRN Fiona Vasquez M.D.   1,750 Units at 02/22/18 1006   • heparin injection 3,000 Units  3,000 Units DIALYSIS PRN Fiona Vasquez M.D.   3,000 Units at 02/22/18 1006   • vasopressin (VASOSTRICT) 20 Units in  mL Infusion  0.03 Units/min Continuous Narinder Encarnacion M.D. 9 mL/hr at 02/23/18 0312 0.03 Units/min at 02/23/18 0312   • amiodarone (CORDARONE) 450 mg in D5W 250 mL Infusion  0.5 mg/min Continuous Olivia Jimenes M.D. 17 mL/hr at 02/22/18 1952 0.5 mg/min at 02/22/18 1952   • dexmedetomidine (PRECEDEX) 400 mcg in D5W 100 mL infusion  0.1-1.5 mcg/kg/hr Continuous Olivia Jimenes M.D. 62.5 mL/hr at 02/23/18 0723 1 mcg/kg/hr at 02/23/18 0723   • heparin injection 5,000 Units  5,000 Units PRN Richie Agrawal M.D.   Stopped at 02/21/18 1210    And   • heparin infusion 25,000 units in 500 ml 0.45% nacl   Continuous Richie Agrawal M.D. 15 mL/hr at 02/23/18 0728 750 Units/hr at 02/23/18 0728   • fentaNYL (SUBLIMAZE) injection 25 mcg  25 mcg Q HOUR PRN Reece Pratt D.O.        Or   • fentaNYL (SUBLIMAZE) injection 50 mcg  50 mcg Q HOUR PRN Reece Pratt D.O.        Or   • fentaNYL (SUBLIMAZE) injection 100 mcg  100 mcg Q HOUR PRN TRUDI Ramirez.O.       • ipratropium-albuterol (DUONEB) nebulizer solution 3 mL  3 mL Q2HRS PRN (RT) NAIMA RamirezO.       • ipratropium-albuterol (DUONEB) nebulizer solution 3 mL  3 mL Q4HRS (RT) NAIMA RamirezO.   3 mL at 02/23/18 0631   • insulin regular (HUMULIN R) injection 3-14 Units  3-14 Units Q6HRS TRUDI Ramirez.O.   Stopped at 02/19/18 1200    And   • glucose 4 g chewable tablet 16 g  16 g Q15 MIN PRN TRUDI Ramirez.O.        And   • dextrose 50% (D50W) injection 25 mL  25 mL Q15 MIN PRN Reece Pratt D.O.   25 mL at 02/23/18 0224   • senna-docusate (PERICOLACE or SENOKOT S) 8.6-50 MG per tablet 2 Tab  2 Tab  BID Richie Agrawal M.D.   2 Tab at 02/23/18 0836    And   • polyethylene glycol/lytes (MIRALAX) PACKET 1 Packet  1 Packet QDAY PRN Richie Agrawal M.D.        And   • magnesium hydroxide (MILK OF MAGNESIA) suspension 30 mL  30 mL QDAY PRN Richie Agrawal M.D.   30 mL at 02/23/18 0836    And   • bisacodyl (DULCOLAX) suppository 10 mg  10 mg QDAY PRN Richie Agrawal M.D.       • acetaminophen (TYLENOL) tablet 650 mg  650 mg Q6HRS PRN Richie Agrawal M.D.       • NS (BOLUS) infusion 500 mL  500 mL Once PRN Richie Agrawal M.D.       • ondansetron (ZOFRAN) syringe/vial injection 4 mg  4 mg Q4HRS PRN Richie Agrawal M.D.       • ondansetron (ZOFRAN ODT) dispertab 4 mg  4 mg Q4HRS PRN Richie Agrawal M.D.       • promethazine (PHENERGAN) tablet 12.5-25 mg  12.5-25 mg Q4HRS PRN Richie Agrawal M.D.       • promethazine (PHENERGAN) suppository 12.5-25 mg  12.5-25 mg Q4HRS PRN Richie Agrawal M.D.       • prochlorperazine (COMPAZINE) injection 5-10 mg  5-10 mg Q4HRS PRN Richie Agrawal M.D.       • chlorhexidine (PERIDEX) 0.12 % solution 15 mL  15 mL BID Reece Pratt DSunilOSunil   15 mL at 02/23/18 0836   • lidocaine (XYLOCAINE) 1%  injection  1-2 mL Q30 MIN PRN Reece Pratt D.SAI       • Respiratory Care per Protocol   Continuous RT Richie Agrawal M.D.         Last reviewed on 2/19/2018  3:59 PM by Ryan Correa R.N.  Medications reviewed    Imaging reviewed    EKG (2/19/2018):  I have personally reviewed the EKG this visit and discussed with the patient. It shows , low voltage.     ECHO CONCLUSIONS (2/19/2018):  No prior study is available for comparison.   Poor visualization of all cardiac structures due to body habitus   despite contrast use.  Severely reduced left ventricular systolic function by gross   evaluation.  Flattened septum in systole and diastole consistent with RV pressure   and volume overload.  Severely dilated right ventricle.  Reduced  right ventricular systolic function.  Valves not seen.       Impressions:  1. Hypoxic and hypercarbic respiratory failure  2. Mixed cardiogenic and possibly obstructive shock  3. Lactic acidosis  4. Extreme obesity (BMI 92)  5. Severe biventricular systolic dysfunction  6. Acutely decompensated congestive heart failure, CVP 36  7. JAYSON, anuric now on new renal replacement therapy  8. Possible pulmonary embolism  9. Systemic anticoagulation with heparin  10. Possible aspiration pneumonia  11. Atrial fibrillation  12. Incomplete database  13. Documented medical noncompliance  14. Hepatocellular injury, worsening  15. MultiOrgan system failure     Recommendations:  1. Agree with continued renal replacement therapy for metabolic acidosis and volume management. Recommend aggressive ultrafiltration as tolerated hemodynamically.  2. Continue vasopressor and inotropic support  3. IV heparin gtt ongoing   4. No BB, ACEI due to renal and hemodynamic compromise.  5. HR acceptable for degree of hemodynamic impairment. Amiodarone therapy suboptimal due to long duration of AF and no prior anticoagulation, however not able to tolerate other rate modifying mediations at this time.  6. Should HRs increase, recommend judicious digoxin administration with levels given JAYSON.  New recommendations today:  7. Consider flolan for severe RV dysfunction and likely PH to improve forward cardiac flow.  8. Consider further abdominal imaging, however likely congestive/ischemic hepatopathy. Hepatitis panel ordered.     Thank you for this interesting consultation. It was my pleasure to see Davina Franco today.     Josh Montano MD, FACC, [facility]  Division of Interventional Cardiology  [facility]

## 2018-02-23 NOTE — CARE PLAN
Problem: Ventilation Defect:  Goal: Ability to achieve and maintain unassisted ventilation or tolerate decreased levels of ventilator support  Adult Ventilation Update    Total Vent Days: 5  APRV pHigh 28 / pLow 0 tHigh 4.5 / tLow  90%  Patient Lines/Drains/Airways Status    Active Airway     Name: Placement date: Placement time: Site: Days:    Airway Group ET Tube Oral 8.0 02/19/18   1116   Oral   5              Plateau Pressure (Q Shift): 33 (02/20/18 0628)  Static Compliance (ml / cm H2O): 27.4 (02/23/18 0349)    Patient failed trials because of Barriers to Wean: FiO2 >60% or PEEP >10 CM H2O;Other (Comments) (no drop and stretch wean per MD ) (02/22/18 1821)    Sputum/Suction   Cough: Productive (02/23/18 0000)  Sputum Amount: Scant (02/23/18 0000)  Sputum Color: White;Tan (02/23/18 0000)  Sputum Consistency: Thick (02/23/18 0000)    Mobility Group  Activity Performed: Unable to mobilize (02/22/18 1600)  Time Activity Tolerated: 30 min (02/21/18 1800)  Assistance / Tolerance: No assistance required (02/21/18 1800)  Pt Calls for Assistance: Yes (02/21/18 1800)  Staff Present for Mobilization: RN (02/21/18 1800)  Reason Not Mobilized: Unstable condition (02/22/18 1600)  Mobilization Comments: Pt on APRV with FiO2 90% / Hypotensive with turns (02/22/18 1600)    Events/Summary/Plan: ABG drawn. FiO2 titrated to 90% (02/22/18 1015)      Problem: Bronchoconstriction:  Goal: Improve in air movement and diminished wheezing  DUO Q4

## 2018-02-23 NOTE — ASSESSMENT & PLAN NOTE
Continue to hold tube feeding  NG to suction  Continue Reglan  Close clinical monitoring  Patient is unstable for CT or any invasive interventions

## 2018-02-23 NOTE — PROGRESS NOTES
Osito from Lab called with critical result of Lactic Acid 5.3 at 2014. Critical lab result read back to Osito.   Dr. Noyola notified of critical lab result at 2025.  Critical lab result read back by Dr. Noyola. No new orders at this time

## 2018-02-23 NOTE — PROGRESS NOTES
Pulmonary Critical Care Progress Note        Date of Service:  2/23/2018  Chief Complaint: Worsening cough and dyspnea    History of Present Illness: 47 y.o. female admitted for acute hypoxic respiratory failure likely due to biventricular failure and cardiogenic shock.     ROS: Unable to obtain as the patient is on the ventilator and sedated  Interval Events:  24 hour interval history reviewed    - no events overnight   - not following and on precedex at 1   - a-fib  80-120s   - SBP 90-120s   - drake at 50 and vaso 0.03   - amio at 0.5   - TFs off due to high residuals   - UOP of 40cc overnight   - HD scheduled for today   - heparin drip   - vent day #4   - APRV settings without changes   - CXR(reviewed): improving severe pulmonary edema   - AST/ALT still rising   - INR at 3     Yesterday's Events:   - HD catheter placed by vascular yesterday   - pH has normalized   - Bicarb drip still at 100   - Amio at 0.5   - Vaso at 0.03   - Drake at 50   - lasix drip at 5   - precedex 0.6   - HD last night with 3 liters removed and this morning with 3 liters removed    - a-fib 100-120s   - SBP 90-110s   - TFs at goal   - heparin drip   - UOP of 51cc overnight   - vent day #4   - no SBTs since on APRV with pHigh of 28, FioO2 of 90%   - CXR(reviewed): severe pulm edema   - day #4 unasyn/azithromycin   - vanco x 1 dose    PFSH:  No change.    Respiratory:  Schreiber Vent Mode: APRV, FiO2: 90, Static Compliance (ml / cm H2O): 27.4, Control VTE (exp VT): 629  Pulse Oximetry: 99 %    Exam: coarse with minimal air movement, no wheezing  ImagingAvailable data reviewed   Recent Labs      02/21/18   0414  02/22/18   0444  02/22/18   1013   ISTATAPH  7.182*  7.439  7.386*   ISTATAPCO2  31.7  32.0  36.1   ISTATAPO2  58*  65  85   ISTATATCO2  13*  23  23   [id number]  83*  93  96   ISTATARTHCO3  11.9*  21.7  21.7   ISTATARTBE  -15*  -2  -3   ISTATTEMP  96.8 F  97.9 F  97.8 F   ISTATFIO2  90  100  100   ISTATSPEC  Arterial  Arterial  Arterial    ISTATAPHTC  7.196*  7.445  7.393*   [id number]  55*  63*  83       HemoDynamics:  Pulse: (!) 55, Heart Rate (Monitored): 99  Arterial BP: (!) 97/68  CVP (mm Hg): (!) 58 MM HG    Exam:rapid irregularly irregular, no murmur, 4+ pitting pedal edema throughout body, 1+ dpp=, delayed cap refill by 4-5 seconds, worsening duskiness to toes   Imaging: Available data reviewed   ECHO 2/19:  No prior study is available for comparison.   Poor visualization of all cardiac structures due to body habitus   despite contrast use.  Severely reduced left ventricular systolic function by gross   evaluation.  Flattened septum in systole and diastole consistent with RV pressure   and volume overload.  Severely dilated right ventricle.  Reduced right ventricular systolic function.  Valves not seen.  Recent Labs      02/21/18   0317  02/22/18   0518  02/23/18   0320   TROPONINI  2.47*  2.95*  2.50*   BNPBTYPENAT  370*   --    --        Neuro:  GCS Total Stella Coma Score: 7       Exam: lightly sedated, minimally responsive to noxious stimuli, moving all x 4 weakly, grimaces, not following  Imaging: Available data reviewed    Fluids:  Intake/Output       02/21/18 0700 - 02/22/18 0659 02/22/18 0700 - 02/23/18 0659 02/23/18 0700 - 02/24/18 0659      9106-6225 0042-8346 Total 7586-0913 9531-1587 Total 3477-9104 1817-1770 Total       Intake    I.V.  1950.6  3026.3 4976.9  1953.7  672.5 2626.2  --  -- --    Precedex Volume 318.6 398.3 716.9 554.6 322.1 876.7 -- -- --    Amiodarone Volume -- 203.4 203.4 204 102 306 -- -- --    Vasopressin Volume 248 108 356 108 54 162 -- -- --    Phenylephrine Volume 89 88.6 177.6 131.6 56.4 188 -- -- --    Norepinephrine Volume 15 -- 15 -- -- -- -- -- --    Heparin Volume -- 468 468 325.6 138 463.6 -- -- --    IVPB -- 700 700 -- -- -- -- -- --    IV Volume (Lasix) 80 60 140 30 -- 30 -- -- --    Sodium Bicarbonate 1200 1000 2200 600 -- 600 -- -- --    Other  --  15 15  30  30 60  --  -- --    Medications  (P.O./ Enteral Liquids) -- 15 15 30 30 60 -- -- --    Dialysis  --  -- --  500  -- 500  --  -- --    Dialysis Volume (Dialysis Intake) -- -- -- 500 -- 500 -- -- --    Enteral  1920  840 2760  450  60 510  --  -- --    Enteral Volume 1326 787 2267 390 -- 390 -- -- --    Free Water / Tube Flush 240 60 300 60 60 120 -- -- --    Total Intake 3870.6 3881.3 7751.9 2933.7 762.5 3696.2 -- -- --       Output    Urine  0  51 51  20  -- 20  --  -- --    Indwelling Cathether 0 51 51 20 -- 20 -- -- --    Drains  0  0 0  450  -- 450  --  -- --    Residual Amount (ml) (Discarded) 0 0 0 450 -- 450 -- -- --    Dialysis  --  3000 3000  3500  -- 3500  --  -- --    Dialysis Output (Dialysis Output) -- 3000 3000 3500 -- 3500 -- -- --    Stool  --  -- --  --  -- --  --  -- --    Number of Times Stooled -- 0 x 0 x -- -- -- -- -- --    Total Output 0 3051 3051 3970 -- 3970 -- -- --       Net I/O     3870.6 830.3 4700.9 -1036.3 762.5 -273.8 -- -- --           Recent Labs      02/21/18 0317 02/22/18   0518   SODIUM  137  136   POTASSIUM  5.1  5.0   CHLORIDE  102  100   CO2  14*  21   BUN  36*  40*   CREATININE  2.64*  3.35*   MAGNESIUM  1.9  1.9   PHOSPHORUS  8.2*  6.3*   CALCIUM  7.7*  6.6*       GI/Nutrition:  Exam:morbily obese, anasarca, hypoactive bowel sounds, no obvious tenderness, distended  Imaging: Available data reviewed  [facility]  Liver Function  Recent Labs      02/21/18 0317 02/22/18   0518   GLUCOSE  150*  178*       Heme:  Recent Labs      02/21/18 0317 02/22/18   0518   02/22/18   1546  02/22/18   2338  02/23/18   0320   RBC  4.64   --   4.22   --    --    --   4.40   HEMOGLOBIN  13.1   --   11.5*   --    --    --   12.1   HEMATOCRIT  45.1   --   38.8   --    --    --   40.5   PLATELETCT  223   --   126*   --    --    --   114*   PROTHROMBTM  31.4*   --   34.7*   --    --    --   32.3*   APTT   --    < >   --    < >  122.4*  109.1*  91.0*   INR  3.07*   --   3.48*   --    --    --   3.18*    < > = values in  this interval not displayed.       Infectious Disease:  Temp  Av.9 °C (98.4 °F)  Min: 36.7 °C (98 °F)  Max: 37.4 °C (99.3 °F)  Micro: reviewed  Recent Labs      18   WBC  30.6*  15.7*  16.2*   NEUTSPOLYS  88.90*  87.60*  73.50*   LYMPHOCYTES  4.50*  9.70*  12.70*   MONOCYTES  5.60  2.70  12.30   EOSINOPHILS  0.00  0.00  0.00   BASOPHILS  0.10  0.00  0.20     Current Facility-Administered Medications   Medication Dose Frequency Provider Last Rate Last Dose   • phenylephrine (NAY-SYNEPHRINE) 80,000 mcg in D5W 250 mL Infusion  0-300 mcg/min Continuous Olivia Jimenes M.D. 9.4 mL/hr at 18 50 mcg/min at 18   • famotidine (PEPCID) tablet 20 mg  20 mg Q EVENING Olivia Jimenes M.D.   20 mg at 18   • heparin injection 1,750 Units  1,750 Units DIALYSIS PRN Fiona Vasquez M.D.   1,750 Units at 18   • heparin injection 3,000 Units  3,000 Units DIALYSIS PRN Fiona Vasquez M.D.   3,000 Units at 18   • vasopressin (VASOSTRICT) 20 Units in  mL Infusion  0.03 Units/min Continuous Narinder Encarnacion M.D. 9 mL/hr at 18 0.03 Units/min at 18   • amiodarone (CORDARONE) 450 mg in D5W 250 mL Infusion  0.5 mg/min Continuous Olivia Jimenes M.D. 17 mL/hr at 18 0.5 mg/min at 18   • dexmedetomidine (PRECEDEX) 400 mcg in D5W 100 mL infusion  0.1-1.5 mcg/kg/hr Continuous Olivia Jimenes M.D. 56.3 mL/hr at 184 0.9 mcg/kg/hr at 18 0404   • heparin injection 5,000 Units  5,000 Units PRN Richie Agrawal M.D.   Stopped at 18 1210    And   • heparin infusion 25,000 units in 500 ml 0.45% nacl   Continuous Richie Agrawal M.D. 19 mL/hr at 18 0028 950 Units/hr at 18 0028   • norepinephrine (LEVOPHED) 16 mg in  mL Infusion  0-30 mcg/min Continuous Olivia Jimenes M.D.   Stopped at 18 0333   • fentaNYL (SUBLIMAZE) injection 25 mcg  25  mcg Q HOUR PRN Reece Pratt D.O.        Or   • fentaNYL (SUBLIMAZE) injection 50 mcg  50 mcg Q HOUR PRN Reece Pratt D.O.        Or   • fentaNYL (SUBLIMAZE) injection 100 mcg  100 mcg Q HOUR PRN NAIMA RamirezO.       • ipratropium-albuterol (DUONEB) nebulizer solution 3 mL  3 mL Q2HRS PRN (RT) Reece Pratt D.O.       • ipratropium-albuterol (DUONEB) nebulizer solution 3 mL  3 mL Q4HRS (RT) NAIMA RamirezO.   3 mL at 02/23/18 0349   • insulin regular (HUMULIN R) injection 3-14 Units  3-14 Units Q6HRS TRUDI Ramirez.O.   Stopped at 02/19/18 1200    And   • glucose 4 g chewable tablet 16 g  16 g Q15 MIN PRN Reece Pratt D.O.        And   • dextrose 50% (D50W) injection 25 mL  25 mL Q15 MIN PRN NAIMA RamirezOSunil   25 mL at 02/23/18 0224   • senna-docusate (PERICOLACE or SENOKOT S) 8.6-50 MG per tablet 2 Tab  2 Tab BID Richie Agrawal M.D.   2 Tab at 02/22/18 2036    And   • polyethylene glycol/lytes (MIRALAX) PACKET 1 Packet  1 Packet QDAY PRN Richie Agrawal M.D.        And   • magnesium hydroxide (MILK OF MAGNESIA) suspension 30 mL  30 mL QDAY PRN Richie Agrawal M.D.        And   • bisacodyl (DULCOLAX) suppository 10 mg  10 mg QDAY PRN Richie Agrawal M.D.       • acetaminophen (TYLENOL) tablet 650 mg  650 mg Q6HRS PRN Richie Agrawal M.D.       • NS (BOLUS) infusion 500 mL  500 mL Once PRN Richie Agrawal M.D.       • ampicillin/sulbactam (UNASYN) 3 g in  mL IVPB  3 g Q6HRS TOVA CespedesD.   Stopped at 02/23/18 0223   • azithromycin (ZITHROMAX) tablet 250 mg  250 mg Q24HRS Richie Agrawal M.D.   250 mg at 02/22/18 0850   • ondansetron (ZOFRAN) syringe/vial injection 4 mg  4 mg Q4HRS PRN Richie Agrawal M.D.       • ondansetron (ZOFRAN ODT) dispertab 4 mg  4 mg Q4HRS PRN Richie Agrawal M.D.       • promethazine (PHENERGAN) tablet 12.5-25 mg  12.5-25 mg Q4HRS PRN Richie Agrawal M.D.       • promethazine (PHENERGAN)  suppository 12.5-25 mg  12.5-25 mg Q4HRS PRN Richie Agrawal M.D.       • prochlorperazine (COMPAZINE) injection 5-10 mg  5-10 mg Q4HRS PRN Richie Agrawal M.D.       • chlorhexidine (PERIDEX) 0.12 % solution 15 mL  15 mL BID Reece Pratt D.O.   15 mL at 02/22/18 2036   • lidocaine (XYLOCAINE) 1%  injection  1-2 mL Q30 MIN PRN Reece Pratt D.O.       • Respiratory Care per Protocol   Continuous RT Richie Agrawal M.D.         Last reviewed on 2/19/2018  3:59 PM by Ryan Correa R.N.    Quality  Measures:  EKG reviewed, Medications reviewed, Labs reviewed and Radiology images reviewed  Crooks catheter: No Crooks  Central line in place: Need for access, Vasopressors and Concentrated IV drugs    DVT Prophylaxis: Heparin  DVT prophylaxis - mechanical: SCDs  Ulcer prophylaxis: Yes  Antibiotics: Treating active infection/contamination beyond 24 hours perioperative coverage        Problems/Plan:    Acute Hypoxic and Hypercapnic Respiratory Failure   - intubated in ER on 2/18   - cont full vent support   - cont RT/O2 protocols   - cont APRV setting    **may need Flolan due to severe pulmonary HTN and RV dysfunction   - follow ABGs/CXR and adjust ventilator   - cont vent bundle protocols   - no SBTs  Acute Pulmonary Edema   - likely due to cardiogenic shock and biventricular failure   - cont supportive vent measures   - HD for volume  Acute Kidney Injury   - renal consulted   - HD catheter placed 2/21   - HD daily per nephrology  Acute Transaminitis   - likely due to RV failure and hepatic congestion   - follow INR  Cardiogenic Shock   - ECHO revealed bivent failure   - cont vasopressors and titrate for MAP > 65   - cardiology following  Acute Atrial Fibrillation with RVR   - heparin drip   - amio bolus and drip continued  Acute Biventricular Heart Failure   - cont vasopressors for MAP goals >65   - cardiology consulted   - noted RV dilation and concern for PE, but due to patient's weight and JAYSON  unable to do CTA to rule out PE  Anasarca   - due to bivent heart failure   - cont HD/SCUF  Acute leukocytosis   - follow   - BCs with staph epi-->s/p one dose vanco   - follow cultures   - cont unasyn/azithro  Hyperkalemia   - follow  Severe Morbid Obesity  Recent Influenza A infection  Prophylaxis   - SCDs, heparin, pepcid  Prognosis   - Palliative following   - continues to remain poor due to biventricular failure, hepatic failure, respiratory failure, and renal failure requiring multiple vasopressors and HD.  Will add Flolan for severe RV dilation and severe pulm HTN    Discussed patient condition and risk of morbidity and/or mortality with RN, RT, Therapies, Pharmacy, Dietary, , Charge nurse / hot rounds and cardiology and hospitalist.    The patient remains critically ill.  Critical care time = 43 minutes in directly providing and coordinating critical care and extensive data review.  No time overlap and excludes procedures.

## 2018-02-24 NOTE — CARE PLAN
Problem: Ventilation Defect:  Goal: Ability to achieve and maintain unassisted ventilation or tolerate decreased levels of ventilator support  Outcome: PROGRESSING AS EXPECTED  Adult Ventilation Update    Total Vent Days: 6  APRV PHigh: 28 PLow: 0 THigh: 4.5 TLow: 0.5    Patient Lines/Drains/Airways Status    Active Airway     Name: Placement date: Placement time: Site: Days:    Airway Group ET Tube Oral 8.0 02/19/18   1116   Oral   6                 Plateau Pressure (Q Shift): 33 (02/20/18 0628)  Static Compliance (ml / cm H2O): 66.6 (02/24/18 1506)    Sputum/Suction   Cough: Non Productive (02/24/18 1506)  Sputum Amount: Unable to Evaluate (02/24/18 1506)  Sputum Color: Unable to Evaluate (02/24/18 1506)  Sputum Consistency: Unable to Evaluate (02/24/18 1506)    Events/Summary/Plan: Flolan check. No vent changes made at this time (02/24/18 1506)

## 2018-02-24 NOTE — CARE PLAN
Problem: Ventilation Defect:  Goal: Ability to achieve and maintain unassisted ventilation or tolerate decreased levels of ventilator support  Adult Ventilation Update    Total Vent Days: 6  APRV 28/0 4.5/0.5 60%  Patient Lines/Drains/Airways Status    Active Airway     Name: Placement date: Placement time: Site: Days:    Airway Group ET Tube Oral 8.0 02/19/18   1116   Oral   4                Plateau Pressure (Q Shift): 33 (02/20/18 0628)  Static Compliance (ml / cm H2O): 43 (02/24/18 0213)    Patient failed trials because of Barriers to Wean: FiO2 >60% or PEEP >10 CM H2O;Other (Comments) (no drop and stretch wean per MD) (02/24/18 0213)    Sputum/Suction   Cough: Productive (02/24/18 0000)  Sputum Amount: Moderate (02/24/18 0000)  Sputum Color: Bloody;Tan (02/24/18 0000)  Sputum Consistency: Thick (02/24/18 0000)    Mobility Group  Activity Performed: Unable to mobilize (02/23/18 2000)  Time Activity Tolerated: 30 min (02/21/18 1800)  Assistance / Tolerance: No assistance required (02/21/18 1800)  Pt Calls for Assistance: Yes (02/21/18 1800)  Staff Present for Mobilization: RN (02/21/18 1800)  Reason Not Mobilized: Unstable condition (02/23/18 2000)  Mobilization Comments: APRV/FiO2 90% (02/23/18 2000)    Events/Summary/Plan: Flolan changed (02/24/18 0413)      Problem: Bronchoconstriction:  Goal: Improve in air movement and diminished wheezing  DUO Q4

## 2018-02-24 NOTE — DISCHARGE PLANNING
Medical SW    Referral: Sw completed family meeting w/ pt's brother (Satnam), son (Mark Candelaria) and  (Osito-24 years pt's arlene).    Intervention: They had many questions about how to get custody of vega Hancock who is a teenager and mentally disabled. Osito reports he is  to pt for 1 year.     Satnam has not spoken to his sister for several years due to difference of opinion. When he and his wife p/u Osito and vega Hancock at their home in [place], the home was full of garbage and bed bugs. Osito reports they were $1200.00 behind in rent and were evicted. They are all now at Satnam's place w/ his wife in [place], [place].    CPS [employer] ([phone]) was involved w/ them in [place] because vega Hancock's biological father in [place](?) was calling and threatening to take his son. Pt stated in past she received full custody of son in [place] state court. Son's father is mentally disabled (dx?).     Pt's  reports both he and pt's son receive SSD monthly and pt does not have a monthly income. They were evicted because reportedly pt spent the $500.00 rent on fast food.     reports the son's SSD is going to be taken over by his sister, Gabbie, who is currently traveling here from [place]. She reportedly has POA over finances w/  office.    Sw gave contact numbers to Satnam as follows: [facility] for resources to support vega Hancock, CPS in [place], Court house in [place], and [employer] in [place] to acquire free legal advise for Satnam. Satnam would like to take vega Hancock.     Both  and brother are aware they will have to make difficult decisions regarding pt once they speak w/ medical staff w/o vega Hancock present.         Plan: Sw to assist w/ d/c planning as needed.

## 2018-02-24 NOTE — PROGRESS NOTES
Dany from Lab called with critical result of + BC of gram - rods at 1957. Critical lab result read back to Dany.   Dr. Castellanos notified of critical lab result at 2000.  Critical lab result read back by Dr. Castellanos. Orders received to change IV antibiotics to Zosyn.

## 2018-02-24 NOTE — PROGRESS NOTES
Nephrology Progress Note, Adult, Complex               Author: Fiona Pelayomariahanson Date & Time created: 2/23/2018  4:31 PM     Interval History:  48 y/o female with multiple medical problems, morbidly obese  In JAYSON, septic shock/anuric  Resp failure: intubated/vent -hypoxic  On two pressors  On daily dialysis  Seen and examined during dialysis -tolerates well  Review of Systems:  Review of Systems   Reason unable to perform ROS: intubated.       Physical Exam:  Physical Exam   Constitutional: She appears well-developed. No distress.   morbidly   HENT:   Head: Normocephalic and atraumatic.   ETT   Eyes: Conjunctivae are normal. Pupils are equal, round, and reactive to light. No scleral icterus.   Cardiovascular: Normal rate and regular rhythm.  Exam reveals no gallop and no friction rub.    Pulmonary/Chest: She has no wheezes. She has rales.   Coarse BS  vented   Abdominal: Soft. Bowel sounds are normal. She exhibits distension.   Musculoskeletal: She exhibits edema.   Anasarca   Neurological:   sedated   Nursing note and vitals reviewed.      Labs:  Recent Labs      02/22/18   1013  02/23/18   0424  02/23/18   0628   ISTATAPH  7.386*  7.425  7.414   ISTATAPCO2  36.1  34.5  31.1   ISTATAPO2  85  72  71   ISTATATCO2  23  24  21   [id number]  96  95  95   ISTATARTHCO3  21.7  22.7  19.9   ISTATARTBE  -3  -1  -4   ISTATTEMP  97.8 F  98.4 F  98.6 F   ISTATFIO2  100  90  90   ISTATSPEC  Arterial  Arterial  Arterial   ISTATAPHTC  7.393*  7.427  7.414   [id number]  83  72  71     Recent Labs      02/21/18   0317  02/22/18   0518  02/23/18   0320   TROPONINI  2.47*  2.95*  2.50*   BNPBTYPENAT  370*   --    --      Recent Labs      02/21/18   0317  02/22/18   0518  02/23/18   0526   SODIUM  137  136  132*   POTASSIUM  5.1  5.0  5.4   CHLORIDE  102  100  100   CO2  14*  21  17*   BUN  36*  40*  52*   CREATININE  2.64*  3.35*  4.43*   MAGNESIUM  1.9  1.9   --    PHOSPHORUS  8.2*  6.3*   --    CALCIUM  7.7*  6.6*  6.2*     Recent Labs       18   0526   ALTSGPT   --    --   2837*   ASTSGOT   --    --   2520*   ALKPHOSPHAT   --    --   222*   TBILIRUBIN   --    --   2.5*   GLUCOSE  150*  178*  113*     Recent Labs      18   0320  18   0630  18   1300   RBC  4.64   --   4.22   --   4.40   --    --    HEMOGLOBIN  13.1   --   11.5*   --   12.1   --    --    HEMATOCRIT  45.1   --   38.8   --   40.5   --    --    PLATELETCT  223   --   126*   --   114*   --    --    PROTHROMBTM  31.4*   --   34.7*   --   32.3*   --    --    APTT   --    < >   --    < >  91.0*  102.4*  111.4*   INR  3.07*   --   3.48*   --   3.18*   --    --     < > = values in this interval not displayed.     Recent Labs      18   0526   WBC  30.6*  15.7*  16.2*   --    NEUTSPOLYS  88.90*  87.60*  73.50*   --    LYMPHOCYTES  4.50*  9.70*  12.70*   --    MONOCYTES  5.60  2.70  12.30   --    EOSINOPHILS  0.00  0.00  0.00   --    BASOPHILS  0.10  0.00  0.20   --    ASTSGOT   --    --    --   2520*   ALTSGPT   --    --    --   2837*   ALKPHOSPHAT   --    --    --   222*   TBILIRUBIN   --    --    --   2.5*           Hemodynamics:  Temp (24hrs), Av.8 °C (98.2 °F), Min:36.3 °C (97.3 °F), Max:37.4 °C (99.3 °F)  Temperature: 36.3 °C (97.3 °F)  Pulse  Av.5  Min: 32  Max: 141Heart Rate (Monitored): (!) 127  Blood Pressure: 128/90, Arterial BP: 123/94  CVP (mm Hg): (!) 51 MM HG  Respiratory:  Schreiber Vent Mode: APRV, FiO2: 90, P Peak (PIP): 33, P MEAN: 27 Respiration: (!) 24, Pulse Oximetry: 94 %     Work Of Breathing / Effort: Vented  RUL Breath Sounds: Crackles, RML Breath Sounds: Diminished, RLL Breath Sounds: Diminished, TEJAS Breath Sounds: Crackles, LLL Breath Sounds: Diminished  Fluids:    Intake/Output Summary (Last 24 hours) at 18 1631  Last data filed at 18 1600   Gross per 24 hour   Intake          3426.95 ml   Output              5060 ml   Net         -1633.05 ml        GI/Nutrition:  Orders Placed This Encounter   Procedures   • Diet NPO     Standing Status:   Standing     Number of Occurrences:   1     Order Specific Question:   Type:     Answer:   Now [1]     Order Specific Question:   Restrict to:     Answer:   Strict [1]     Medical Decision Making, by Problem:  Active Hospital Problems    Diagnosis   • Acute kidney injury (CMS-HCC) [N17.9]   • Shock (CMS-HCC) [R57.9]   • Cardiomyopathy (CMS-HCC) [I42.9]   • Chronic atrial fibrillation (CMS-HCC) [I48.2]   • Sepsis (CMS-HCC) [A41.9]   • Metabolic acidosis [E87.2]   • Acute respiratory failure (CMS-HCC) [J96.00]   • Morbid obesity (CMS-HCC) [E66.01]   • PNA (pneumonia) [J18.9]   • Cor pulmonale (CMS-HCC) [I27.81]   • Anasarca [R60.1]   • Acute metabolic encephalopathy [G93.41]   • Elevated LFTs [R79.89]       Quality-Core Measures   Reviewed items::  Labs reviewed and Medications reviewed    Assessment and plan  1.JAYSON/ATN/HD -on daily HD -seen and examined during dialysis -please see dialysis flow sheet for details  2.Hypotension /shock -on 2 pressors  3.Electrolytes: hypocalcemia -increased calcium bath  4.Anemia: Hb WNL  5.Metabolic acidosis -correcting with HD -increased bicarb erick to 40  6.Volume:UF goal 3.5-4.5 L L    Recs: daily dialysis             Daily BMP             Keep MAP> 65

## 2018-02-24 NOTE — PROGRESS NOTES
Dark brown liquid noted when orally suctioning pt.  Same liquid noted on pt neck and behind head.  Tube feeding immediately turned off and MD notified.  Order for OG tube to low suction received.  Pt BP also soft as dialysis starting.  Drake maxed out at 300 mcg.  Dr Jimenes in room and verbal order obtained for Levo.  OG tube tube dropped without difficulty and Levo started at 5mcg/kg/min.  Albumin also given by dialysis RN.  100 ml of dark brown fluid noted from OG tube in suction container.

## 2018-02-24 NOTE — PROGRESS NOTES
Nephrology Progress Note, Adult, Complex               Author: Fiona Pelayomariahanson Date & Time created: 2/24/2018  12:52 PM     Interval History:  46 y/o female with multiple medical problems, morbidly obese  In JAYSON, septic shock/anuric, liver failure  Resp failure: intubated/vent -hypoxic  On two pressors  On daily dialysis  Seen and examined during dialysis -tolerates well    Review of Systems:  Review of Systems   Reason unable to perform ROS: intubated.       Physical Exam:  Physical Exam   Constitutional: She appears well-developed. No distress.   morbidly   HENT:   Head: Normocephalic and atraumatic.   ETT   Eyes: Conjunctivae are normal. Pupils are equal, round, and reactive to light. No scleral icterus.   Cardiovascular: Normal rate and regular rhythm.  Exam reveals no gallop and no friction rub.    Pulmonary/Chest: She has no wheezes. She has rales.   Coarse BS  vented   Abdominal: Soft. Bowel sounds are normal. She exhibits distension.   Musculoskeletal: She exhibits edema.   Anasarca   Neurological:   sedated   Nursing note and vitals reviewed.      Labs:  Recent Labs      02/23/18   0424  02/23/18   0628  02/24/18   0415   ISTATAPH  7.425  7.414  7.317*   ISTATAPCO2  34.5  31.1  43.9*   ISTATAPO2  72  71  197*   ISTATATCO2  24  21  24   RRVAXWB0ITE  95  95  100*   ISTATARTHCO3  22.7  19.9  22.4   ISTATARTBE  -1  -4  -4   ISTATTEMP  98.4 F  98.6 F  98.1 F   ISTATFIO2  90  90  90   ISTATSPEC  Arterial  Arterial  Arterial   ISTATAPHTC  7.427  7.414  7.320*   XRLEEOFH5FF  72  71  195*     Recent Labs      02/22/18   0518  02/23/18   0320   TROPONINI  2.95*  2.50*     Recent Labs      02/22/18   0518  02/23/18   0526  02/24/18   0541   SODIUM  136  132*  130*   POTASSIUM  5.0  5.4  5.7*   CHLORIDE  100  100  96   CO2  21  17*  21   BUN  40*  52*  57*   CREATININE  3.35*  4.43*  5.46*   MAGNESIUM  1.9   --    --    PHOSPHORUS  6.3*   --    --    CALCIUM  6.6*  6.2*  6.6*     Recent Labs      02/22/18   0518   18   0541  18   0954   ALTSGPT   --   2837*   --   1671*   ASTSGOT   --   2520*   --   1013*   ALKPHOSPHAT   --   222*   --   194*   TBILIRUBIN   --   2.5*   --   3.2*   DBILIRUBIN   --    --    --   1.8*   GLUCOSE  178*  113*  115*   --      Recent Labs      18   1300  18   RBC  4.22   --   4.40   --    --    --    --   4.56   HEMOGLOBIN  11.5*   --   12.1   --    --    --    --   12.6   HEMATOCRIT  38.8   --   40.5   --    --    --    --   42.4   PLATELETCT  126*   --   114*   --    --    --    --   93*   PROTHROMBTM  34.7*   --   32.3*   --    --    --    --   32.3*   APTT   --    < >  91.0*   < >  111.4*  94.3*  73.7*   --    INR  3.48*   --   3.18*   --    --    --    --   3.18*    < > = values in this interval not displayed.     Recent Labs      188  18   0954   WBC  15.7*  16.2*   --   25.2*   --    NEUTSPOLYS  87.60*  73.50*   --   76.80*   --    LYMPHOCYTES  9.70*  12.70*   --   10.70*   --    MONOCYTES  2.70  12.30   --   9.80   --    EOSINOPHILS  0.00  0.00   --   0.00   --    BASOPHILS  0.00  0.20   --   0.00   --    ASTSGOT   --    --   2520*   --   1013*   ALTSGPT   --    --   2837*   --   1671*   ALKPHOSPHAT   --    --   222*   --   194*   TBILIRUBIN   --    --   2.5*   --   3.2*           Hemodynamics:  Temp (24hrs), Av.6 °C (97.9 °F), Min:36.3 °C (97.3 °F), Max:36.7 °C (98.1 °F)  Temperature: 36.7 °C (98.1 °F)  Pulse  Av  Min: 32  Max: 141Heart Rate (Monitored): (!) 111  Blood Pressure: 128/90, Arterial BP: 102/66  CVP (mm Hg): (!) 36 MM HG (pre HD)  Respiratory:  Schreiber Vent Mode: APRV, FiO2: 60, P Peak (PIP): 31, P MEAN: 27 Respiration: (!) 22, Pulse Oximetry: 98 %     #Flolan: Yes, Work Of Breathing / Effort: Vented  RUL Breath Sounds: Diminished, RML Breath Sounds: Diminished, RLL Breath Sounds:  Diminished, TEJAS Breath Sounds: Diminished, LLL Breath Sounds: Diminished  Fluids:    Intake/Output Summary (Last 24 hours) at 02/24/18 1252  Last data filed at 02/24/18 0600   Gross per 24 hour   Intake          2978.31 ml   Output             5030 ml   Net         -2051.69 ml        GI/Nutrition:  Orders Placed This Encounter   Procedures   • Diet NPO     Standing Status:   Standing     Number of Occurrences:   1     Order Specific Question:   Type:     Answer:   Now [1]     Order Specific Question:   Restrict to:     Answer:   Strict [1]     Medical Decision Making, by Problem:  Active Hospital Problems    Diagnosis   • Acute kidney injury (CMS-HCC) [N17.9]   • Shock (CMS-[facility]) [R57.9]   • Cardiomyopathy (CMS-[facility]) [I42.9]   • Chronic atrial fibrillation (CMS-[facility]) [I48.2]   • Sepsis (CMS-[facility]) [A41.9]   • Metabolic acidosis [E87.2]   • Acute respiratory failure (CMS-[facility]) [J96.00]   • Morbid obesity (CMS-[facility]) [E66.01]   • PNA (pneumonia) [J18.9]   • Cor pulmonale (CMS-[facility]) [I27.81]   • Anasarca [R60.1]   • Acute metabolic encephalopathy [G93.41]   • Elevated LFTs [R79.89]       Quality-Core Measures   Reviewed items::  Labs reviewed and Medications reviewed    Assessment and plan  1.JAYSON/ATN/HD -on daily HD -seen and examined during dialysis -please see dialysis flow sheet for details  2.Hypotension /shock -on 2 pressors  3.Electrolytes: hypocalcemia -increased calcium bath  4.Anemia: Hb WNL  5.Metabolic acidosis -corrected with HD   6.Volume:UF goal 3.5-4.5 L    Recs: daily dialysis             Daily BMP             Keep MAP> 65             Very poor prognosis

## 2018-02-24 NOTE — PROGRESS NOTES
[facility] Hospitalist Progress Note    Date of Service: 2018    Chief Complaint  47 y.o. female admitted 2018 with Afib anasarca and acute respiratory failure, hx of non compliance with meds    Interval Problem Update  Remains Anuric  Ammonia level 377  Received hemodialysis with 5 L of fluid removed  Obtunded does not follow command  CVP 30  Remains on Drake-Synephrine and vasopressin      Consultants/Specialty  Dr Jimenes   cardiology    Disposition  ICU        Review of Systems   Unable to perform ROS: Intubated      Physical Exam  Laboratory/Imaging   Hemodynamics  Temp (24hrs), Av.7 °C (98.1 °F), Min:36.7 °C (98 °F), Max:36.7 °C (98.1 °F)   Temperature: 36.7 °C (98.1 °F)  Pulse  Av.2  Min: 32  Max: 141 Heart Rate (Monitored): (!) 111  Arterial BP: 115/72 CVP (mm Hg): (!) 33 MM HG (post HD)    Respiratory  Schreiber Vent Mode: APRV, FiO2: 60, P Peak (PIP): 33, P MEAN: 28   Respiration: 19, Pulse Oximetry: 100 %     #Flolan: Yes, Work Of Breathing / Effort: Vented  RUL Breath Sounds: Diminished, RML Breath Sounds: Diminished, RLL Breath Sounds: Diminished, TEJAS Breath Sounds: Diminished, LLL Breath Sounds: Diminished    Fluids    Intake/Output Summary (Last 24 hours) at 18 1513  Last data filed at 18 1433   Gross per 24 hour   Intake          3291.51 ml   Output             4530 ml   Net         -1238.49 ml       Nutrition  Orders Placed This Encounter   Procedures   • Diet NPO     Standing Status:   Standing     Number of Occurrences:   1     Order Specific Question:   Type:     Answer:   Now [1]     Order Specific Question:   Restrict to:     Answer:   Strict [1]     Physical Exam   Constitutional: She appears well-developed.   Morbidly obese   HENT:   Head: Normocephalic and atraumatic.   Mouth/Throat: Oropharynx is clear and moist.   Eyes: Right eye exhibits no discharge. Left eye exhibits no discharge. Scleral icterus is present.   Neck: Neck supple. No JVD present. No tracheal  deviation present.   Cardiovascular: An irregularly irregular rhythm present. Tachycardia present.  Exam reveals no friction rub.    No murmur heard.  Pulmonary/Chest: No respiratory distress. She has decreased breath sounds. She has rhonchi. She has rales. She exhibits no tenderness.   Intubated   Abdominal: Soft. Bowel sounds are normal. She exhibits no distension. There is no tenderness. There is no rebound.   Obese   Musculoskeletal: She exhibits edema (severe generalized). She exhibits no tenderness.   Neurological: No cranial nerve deficit. She exhibits normal muscle tone.   Obtunded  Does not follow    Skin: Skin is warm and dry. She is not diaphoretic. No erythema. Nails show no clubbing.   Cyanotic toes   Psychiatric: She is slowed. Cognition and memory are impaired.   Nursing note and vitals reviewed.      Recent Labs      02/22/18 0518 02/23/18 0320 02/24/18   0408   WBC  15.7*  16.2*  25.2*   RBC  4.22  4.40  4.56   HEMOGLOBIN  11.5*  12.1  12.6   HEMATOCRIT  38.8  40.5  42.4   MCV  91.9  92.0  93.0   MCH  27.3  27.5  27.6   MCHC  29.6*  29.9*  29.7*   RDW  50.4*  52.2*  52.8*   PLATELETCT  126*  114*  93*   MPV  11.7  12.1  13.3*     Recent Labs      02/22/18   0518  02/23/18   0526  02/24/18   0541   SODIUM  136  132*  130*   POTASSIUM  5.0  5.4  5.7*   CHLORIDE  100  100  96   CO2  21  17*  21   GLUCOSE  178*  113*  115*   BUN  40*  52*  57*   CREATININE  3.35*  4.43*  5.46*   CALCIUM  6.6*  6.2*  6.6*     Recent Labs      02/22/18   0518   02/23/18   0320   02/23/18   1300  02/23/18 2005 02/24/18   0205  02/24/18   0408   APTT   --    < >  91.0*   < >  111.4*  94.3*  73.7*   --    INR  3.48*   --   3.18*   --    --    --    --   3.18*    < > = values in this interval not displayed.                  Assessment/Plan     Ileus (CMS-HCC)   Assessment & Plan     tube feeding on hold   NG to suction  Continue Reglan          Acute kidney injury (CMS-HCC)   Assessment & Plan    Likely ATN secondary  to sepsis and shock  Pt is anuric  Continue daily hemodialysis as tolerated   discussed with Dr Vasquez            Metabolic acidosis   Assessment & Plan    Continue hemodialysis        Sepsis (CMS-HCC)- (present on admission)   Assessment & Plan    This is severe sepsis with the following associated acute organ dysfunction(s): acute kidney failure.metabolic encephalopathy    Continue Zosyn and pressors        Chronic atrial fibrillation (CMS-HCC)   Assessment & Plan    Continue heparin drip  D/C amiodarone drip per cardiology given worsening LFT's        Cardiomyopathy (CMS-HCC)   Assessment & Plan    EF20%  Given shock and renal failure no BB or ACEI  Discussed with  Dr Montano  Continue fluid management with hemodialysis          Shock (CMS-HCC)   Assessment & Plan    Likely cardiogenic and possibly septic  On Drake-Synephrine vasopressin   Wean off as tolerated   cortisol level 25.2          Elevated LFTs   Assessment & Plan    With liver failure likely secondary to sepsis and hepatic congestion   US consistent with steatosis , hepatitis serology was negative   Monitor LFT's   Start rifaximin        Acute metabolic encephalopathy   Assessment & Plan    And also hepatic encephalopathy    With ileus unable to tolerate oral lactulose and too unstable for repositioning for rectal lactulose  Will start her on rifaximin        Anasarca   Assessment & Plan    Patient is anuric continue  Fluid management with hemodialysis        Cor pulmonale (CMS-HCC)   Assessment & Plan    Heparin drip for Afib and also possible PE given RV strain on echo   patient unable to tolerate CT of chest due to renal failure and critical illness  Continue flolan        PNA (pneumonia)   Assessment & Plan    Was on unasyn azithromycin  Sputum Cultures negative   Blood culture 1 out of 2 coag-negative staph likely contaminant and 1/2 GNB at day 5  Started on Zosyn follow up on final culture results and sensitivities        Morbid obesity (CMS-HCC)    Assessment & Plan    Body mass index is 91.72 kg/m².         Acute respiratory failure (CMS-HCC)   Assessment & Plan    Vent management per critical care discussed with Dr Jimenes improved oxygen requirements with Flolan  Still with high vent support requirement              Quality-Core Measures   Reviewed items::  Labs reviewed, Medications reviewed and Radiology images reviewed  Crooks catheter::  Critically Ill - Requiring Accurate Measurement of Urinary Output  Central line in place:  Vasopressors  DVT prophylaxis pharmacological::  Heparin  Antibiotics:  Treating active infection/contamination beyond 24 hours perioperative coverage        Overall prognosis is guarded, patient unlikely to survive this hospitalization this was reviewed with her  brother and son yesterday we are awaiting arrival of her daughter to revise goals of care

## 2018-02-24 NOTE — PROGRESS NOTES
Conchita from Lab called with critical result of Ammonia 377 and Ca+ 6.6 at 0614. Critical lab result read back to Conchtia.   Dr. Jimenes notified of critical lab result at 0620.  Critical lab result read back by Dr. Jimenes. No new order received at this time

## 2018-02-24 NOTE — PROGRESS NOTES
Pulmonary Critical Care Progress Note        Date of Service:  2/24/2018  Chief Complaint: Worsening cough and dyspnea    History of Present Illness: 47 y.o. female admitted for acute hypoxic respiratory failure likely due to biventricular failure and cardiogenic shock.     ROS: Unable to obtain as the patient is on the ventilator and sedated  Interval Events:  24 hour interval history reviewed    - no events overnight   - with any movement will become hypotensive   - HD yesterday with 5 liters removed   - no response to stimuli, (+)gag/corneals/cough   - Ammonia level of 344   - a-fib 100   - SBP 90-100s   - CVP 30   -adolfo at 120   - vaso at 0.03   - amio at 0.5   - heparin drip   - BCx2 with GNR   - not tolerating TFs   - OG with bloody gastric contents of 300cc   - no BM since arrival   - UOP of 30cc for 24 hours   - not mobilization   - vent day #6   - FiO2 at 60%   - flolan at 12cc/hr   - CXR(reviewed): improving bilat pulm edema    Yesterday's Events:   - no events overnight   - not following and on precedex at 1   - a-fib  80-120s   - SBP 90-120s   - adolfo at 50 and vaso 0.03   - amio at 0.5   - TFs off due to high residuals   - UOP of 40cc overnight   - HD scheduled for today   - heparin drip   - vent day #5   - APRV settings without changes   - CXR(reviewed): improving severe pulmonary edema   - AST/ALT still rising   - INR at 3     PFSH:  No change.    Respiratory:  Schreiber Vent Mode: APRV, FiO2: 90, Static Compliance (ml / cm H2O): 43, Control VTE (exp VT): 530  Pulse Oximetry: 100 %    Exam: coarse throughout, no wheezing, very little air movement  ImagingAvailable data reviewed   Recent Labs      02/22/18   1013  02/23/18   0424  02/23/18   0628   ISTATAPH  7.386*  7.425  7.414   ISTATAPCO2  36.1  34.5  31.1   ISTATAPO2  85  72  71   ISTATATCO2  23  24  21   GOYJNVJ0XQZ  96  95  95   ISTATARTHCO3  21.7  22.7  19.9   ISTATARTBE  -3  -1  -4   ISTATTEMP  97.8 F  98.4 F  98.6 F   ISTATFIO2  100  90  90    [facility]  Arterial  Arterial  Arterial   ISTATAPH  7.393*  7.427  7.414   BXOBFBXO2ME  83  72  71       HemoDynamics:  Pulse: 93, Heart Rate (Monitored): (!) 105  Blood Pressure: 128/90, Arterial BP: 113/74  CVP (mm Hg): (!) 35 MM HG    Exam:rapid irregularly irregular, no murmur, 4+ pitting pedal edema throughout body, 1+ dpp=, delayed cap refill by 4-5 seconds, continued worsening duskiness to toes   Imaging: Available data reviewed   ECHO 2/19:  No prior study is available for comparison.   Poor visualization of all cardiac structures due to body habitus   despite contrast use.  Severely reduced left ventricular systolic function by gross   evaluation.  Flattened septum in systole and diastole consistent with RV pressure   and volume overload.  Severely dilated right ventricle.  Reduced right ventricular systolic function.  Valves not seen.  Recent Labs      02/22/18   0518  02/23/18   0320   TROPONINI  2.95*  2.50*       Neuro:  GCS Total Stella Coma Score: 7       Exam: lightly sedated, no response to noxious stimuli, moving all x 4 weakly, not following  Imaging: Available data reviewed    Fluids:  Intake/Output       02/22/18 0700 - 02/23/18 0659 02/23/18 0700 - 02/24/18 0659 02/24/18 0700 - 02/25/18 0659      [id number] 5967-4593 Total 5720-8161 4246-8111 Total 5189-6879 3117-3906 Total       Intake    I.V.  1953.7  1302.7 3256.5  1555.6  783.3 2338.9  --  -- --    Precedex Volume 554.6 624.1 1178.6 675.6 337.8 1013.4 -- -- --    Amiodarone Volume 204 204 408 204 102 306 -- -- --    Vasopressin Volume 108 108 216 108 54 162 -- -- --    Phenylephrine Volume 131.6 112.8 244.4 378.1 223.5 601.6 -- -- --    Norepinephrine Volume -- -- -- 14.9 -- 14.9 -- -- --    Heparin Volume 325.6 253.9 579.4 175 66 241 -- -- --    IV Volume (Lasix) 30 -- 30 -- -- -- -- -- --    Sodium Bicarbonate 600 -- 600 -- -- -- -- -- --    Other  30  30 60  --  30 30  --  -- --    Medications (P.O./ Enteral Liquids) 30 30 60 -- 30  30 -- -- --    Dialysis  500  -- 500  500  -- 500  --  -- --    Dialysis Volume (Dialysis Intake) 500 -- 500 500 -- 500 -- -- --    Enteral  450  60 510  20  60 80  --  -- --    Enteral Volume 390 -- 390 20 -- 20 -- -- --    Free Water / Tube Flush 60 60 120 -- 60 60 -- -- --    Total Intake 2933.7 1392.7 4326.5 2075.6 873.3 2948.9 -- -- --       Output    Urine  20  40 60  --  -- --  --  -- --    Indwelling Cathether 20 40 60 -- -- -- -- -- --    Drains  450  -- 450  --  -- --  --  -- --    Residual Amount (ml) (Discarded) 450 -- 450 -- -- -- -- -- --    Dialysis  3500  -- 3500  5000  -- 5000  --  -- --    Dialysis Output (Dialysis Output) 3500 -- 3500 5000 -- 5000 -- -- --    Total Output 3970 40 4010 5000 -- 5000 -- -- --       Net I/O     -1036.3 1352.7 316.5 -2924.5 873.3 -2051.1 -- -- --           Recent Labs      02/22/18 0518 02/23/18   0526   SODIUM  136  132*   POTASSIUM  5.0  5.4   CHLORIDE  100  100   CO2  21  17*   BUN  40*  52*   CREATININE  3.35*  4.43*   MAGNESIUM  1.9   --    PHOSPHORUS  6.3*   --    CALCIUM  6.6*  6.2*       GI/Nutrition:  Exam:morbily obese, anasarca, hypoactive bowel sounds, no obvious tenderness, distended (no changes)  Imaging: Available data reviewed  TFs stopped since pt not tolerating  Liver Function  Recent Labs      02/22/18 0518 02/23/18   0526   ALTSGPT   --   2837*   ASTSGOT   --   2520*   ALKPHOSPHAT   --   222*   TBILIRUBIN   --   2.5*   GLUCOSE  178*  113*       Heme:  Recent Labs      02/22/18   0518   02/23/18   0320   02/23/18   1300  02/23/18 2005 02/24/18   0205   RBC  4.22   --   4.40   --    --    --    --    HEMOGLOBIN  11.5*   --   12.1   --    --    --    --    HEMATOCRIT  38.8   --   40.5   --    --    --    --    PLATELETCT  126*   --   114*   --    --    --    --    PROTHROMBTM  34.7*   --   32.3*   --    --    --    --    APTT   --    < >  91.0*   < >  111.4*  94.3*  73.7*   INR  3.48*   --   3.18*   --    --    --    --     < > = values in  this interval not displayed.       Infectious Disease:  Temp  Av.6 °C (97.9 °F)  Min: 36.3 °C (97.3 °F)  Max: 36.8 °C (98.2 °F)  Micro: reviewed  Recent Labs      18   0518  18   0320  18   05   WBC  15.7*  16.2*   --    NEUTSPOLYS  87.60*  73.50*   --    LYMPHOCYTES  9.70*  12.70*   --    MONOCYTES  2.70  12.30   --    EOSINOPHILS  0.00  0.00   --    BASOPHILS  0.00  0.20   --    ASTSGOT   --    --   2520*   ALTSGPT   --    --   2837*   ALKPHOSPHAT   --    --   222*   TBILIRUBIN   --    --   2.5*     Current Facility-Administered Medications   Medication Dose Frequency Provider Last Rate Last Dose   • SODIUM CHLORIDE 0.9 % IV SOLN           • SODIUM CHLORIDE 0.9 % IV SOLN           • metoclopramide (REGLAN) injection 10 mg  10 mg Q6HRS Richie Agrawal M.D.   10 mg at 18 0113   • albumin human 25% solution 12.5 g  12.5 g DIALYSIS PRN Fiona Vasquez M.D. 150 mL/hr at 18 1337 12.5 g at 18 1337   • norepinephrine (LEVOPHED) 8 mg in D5W 250 mL Infusion  0-30 mcg/min Continuous Olivia Jimenes M.D.   Stopped at 18 1405   • epoprostenol (FLOLAN) 1.5 mg in glycine diluent for flolan 50 mL for Inhalation  0.01-0.05 mcg/kg/min Continuous Olivia Jimenes M.D. 12 mL/hr at 18 0415 0.024 mcg/kg/min at 18 0415   • piperacillin-tazobactam (ZOSYN) IVPB premix 4.5 g  4.5 g Q12HRS Narinedr Encarnacion M.D.   4.5 g at 18 2319   • phenylephrine (NAY-SYNEPHRINE) 80,000 mcg in D5W 250 mL Infusion  0-300 mcg/min Continuous Olivia Jimenes M.D. 26.3 mL/hr at 18 0307 140 mcg/min at 18 0307   • famotidine (PEPCID) tablet 20 mg  20 mg Q EVENING Olivia Jimenes M.D.   20 mg at 18   • heparin injection 1,750 Units  1,750 Units DIALYSIS PRCHIDI Vasquez M.D.   1,750 Units at 18 1138   • heparin injection 3,000 Units  3,000 Units DIALYSIS PRCHIDI Vasquez M.D.   3,000 Units at 18 1415   • vasopressin (VASOSTRICT) 20 Units in   mL Infusion  0.03 Units/min Continuous Narinder Encarnacion M.D. 9 mL/hr at 02/23/18 1900 0.03 Units/min at 02/23/18 1900   • amiodarone (CORDARONE) 450 mg in D5W 250 mL Infusion  0.5 mg/min Continuous Olivia Jimenes M.D. 17 mL/hr at 02/23/18 2343 0.5 mg/min at 02/23/18 2343   • dexmedetomidine (PRECEDEX) 400 mcg in D5W 100 mL infusion  0.1-1.5 mcg/kg/hr Continuous Olivia Jimenes M.D. 56.3 mL/hr at 02/24/18 0432 0.9 mcg/kg/hr at 02/24/18 0432   • heparin injection 5,000 Units  5,000 Units PRN Richie Agrawal M.D.   Stopped at 02/21/18 1210    And   • heparin infusion 25,000 units in 500 ml 0.45% nacl   Continuous Richie Agrawal M.D. 11 mL/hr at 02/24/18 0435 550 Units/hr at 02/24/18 0435   • fentaNYL (SUBLIMAZE) injection 25 mcg  25 mcg Q HOUR PRN Reece Pratt D.O.        Or   • fentaNYL (SUBLIMAZE) injection 50 mcg  50 mcg Q HOUR PRN Reece Pratt D.O.        Or   • fentaNYL (SUBLIMAZE) injection 100 mcg  100 mcg Q HOUR PRN TRUDI Ramirez.O.       • ipratropium-albuterol (DUONEB) nebulizer solution 3 mL  3 mL Q2HRS PRN (RT) TRUDI Ramirez.O.       • ipratropium-albuterol (DUONEB) nebulizer solution 3 mL  3 mL Q4HRS (RT) TRUDI Ramirez.O.   3 mL at 02/24/18 0212   • insulin regular (HUMULIN R) injection 3-14 Units  3-14 Units Q6HRS TRUDI Ramirez.O.   Stopped at 02/19/18 1200    And   • glucose 4 g chewable tablet 16 g  16 g Q15 MIN PRN TRUDI Ramirez.O.        And   • dextrose 50% (D50W) injection 25 mL  25 mL Q15 MIN PRN Reece Pratt D.O.   25 mL at 02/23/18 0224   • senna-docusate (PERICOLACE or SENOKOT S) 8.6-50 MG per tablet 2 Tab  2 Tab BID Richie Agrawal M.D.   2 Tab at 02/23/18 2035    And   • polyethylene glycol/lytes (MIRALAX) PACKET 1 Packet  1 Packet QDAY PRN Richie Agrawal M.D.        And   • magnesium hydroxide (MILK OF MAGNESIA) suspension 30 mL  30 mL QDAY PRN Richie Agarwal M.D.   30 mL at 02/23/18 0836    And   • bisacodyl  (DULCOLAX) suppository 10 mg  10 mg QDAY PRN Richie Agrawal M.D.       • acetaminophen (TYLENOL) tablet 650 mg  650 mg Q6HRS PRN Richie Agrawal M.D.       • NS (BOLUS) infusion 500 mL  500 mL Once PRN Richie Agrawal M.D.       • ondansetron (ZOFRAN) syringe/vial injection 4 mg  4 mg Q4HRS PRN Richie Agrawal M.D.       • ondansetron (ZOFRAN ODT) dispertab 4 mg  4 mg Q4HRS PRN Richie Agrawal M.D.       • promethazine (PHENERGAN) tablet 12.5-25 mg  12.5-25 mg Q4HRS PRN Richie Agrawal M.D.       • promethazine (PHENERGAN) suppository 12.5-25 mg  12.5-25 mg Q4HRS PRN Richie Agrawal M.D.       • prochlorperazine (COMPAZINE) injection 5-10 mg  5-10 mg Q4HRS PRN Richie Agrawal M.D.       • chlorhexidine (PERIDEX) 0.12 % solution 15 mL  15 mL BID Reece Pratt, D.O.   15 mL at 02/23/18 2035   • lidocaine (XYLOCAINE) 1%  injection  1-2 mL Q30 MIN PRN Reece Pratt D.OSunil       • Respiratory Care per Protocol   Continuous RT Richie Agrawal M.D.         Last reviewed on 2/19/2018  3:59 PM by Ryan Correa R.N.    Quality  Measures:  EKG reviewed, Medications reviewed, Labs reviewed and Radiology images reviewed  Crooks catheter: No Crooks  Central line in place: Need for access, Vasopressors and Concentrated IV drugs    DVT Prophylaxis: Heparin  DVT prophylaxis - mechanical: SCDs  Ulcer prophylaxis: Yes  Antibiotics: Treating active infection/contamination beyond 24 hours perioperative coverage        Problems/Plan:    Acute Hypoxic and Hypercapnic Respiratory Failure   - intubated in ER on 2/18   - cont full vent support   - cont RT/O2 protocols   - cont APRV setting   - cont Flolan   - follow ABGs/CXR and adjust ventilator   - cont vent bundle protocols   - no SBTs  Acute Pulmonary Edema   - likely due to cardiogenic shock and biventricular failure   - cont supportive vent measures   - HD daily for volume  Acute Kidney Injury   - renal consulted   - HD catheter placed  2/21   - HD daily per nephrology  Acute Transaminitis   - worsening   - likely due to RV failure and hepatic congestion  Hyperammonemia   - due to elevated transaminases   - would trial lactulose 4 times daily, but pt not tolerating PO   - HD will help  Cardiogenic Shock   - ECHO revealed bivent failure   - cont vasopressors and titrate for MAP > 65   - cardiology following   - not a candidate for RVAD/LVAD due to severe obesity   - unable to do cardiac cath due to severe obesity  Acute Atrial Fibrillation with RVR   - heparin drip   - amio bolus and drip continued  Acute Biventricular Heart Failure   - cont vasopressors for MAP goals >65   - cardiology consulted   - noted RV dilation and concern for PE, but due to patient's weight and JAYSON unable to do CTA to rule out PE   - not a candidate for LVAD/RVAD due to severe obesity  Anasarca   - due to bivent heart failure   - cont HD/SCUF  Acute leukocytosis   - worsening despite treatment   - BCs with staph epi-->s/p one dose vanco   - follow cultures   - cont unasyn/azithro  Hyperkalemia   - follow  Severe Morbid Obesity  Recent Influenza A infection  Prophylaxis   - SCDs, heparin, pepcid  Prognosis   - Palliative following   - continues to remain poor due to biventricular failure, hepatic failure, respiratory failure, and renal failure requiring multiple vasopressors and HD.  Continue Flolan for severe RV dilation and severe pulm HTN    Discussed patient condition and risk of morbidity and/or mortality with RN, RT, Therapies, Pharmacy, Dietary, , Charge nurse / hot rounds and cardiology and hospitalist.    The patient remains critically ill.  Critical care time = 47 minutes in directly providing and coordinating critical care and extensive data review.  No time overlap and excludes procedures.

## 2018-02-24 NOTE — PROGRESS NOTES
[facility] Progress Note    Hemodialysis treatment ordered today per Dr. Vasquez x 3 hours. Treatment initiated at 1133 and ended at 1433.    Pt tachycardic during treatment, Bp supported with pressors and Albumin 25 % x 2 bottles,BP stable, afebrile post HD; see paper flow sheets for details.    Net UF 4,000 mL    Post tx, CVC flushed with saline then locked with heparin 1000 units/mL per designated amount in each wing then clamped and capped. Aspirate heparin prior to next CVC use.    Report given to Primary RN.

## 2018-02-24 NOTE — PROGRESS NOTES
"Interval History:  47F with extreme obesity (BMI 92), atrial fibrillation, biventricular systolic heart failure, and medical noncompliance admitted for dyspnea and hypoxemia in the setting of recent influenza pneumonia. She has been intubated and is sedated, unable to participate in the interview. Noted to have AFRVR with HRs now improving on therapy. Subsequently she has remained hypoxic and hypercarbic with new acute renal failure and mixed metabolic and respiratory acidosis and shock on high dose vasopressors including norepinephrine, vasopressin and phenylephrine.   2/21: Remains ventilated, persistent lactic acidosis, and progressive leukocytosis and multiorgan system dysfunction. Remains atrial fibrillation with physiologically appropriate rates in the low 100s. CVP climbing now up to 50. Grossly volume overloaded. Urine output nearly anuric.  2/22: Initiated on renal replacement therapy with 3 L ultrafiltration overnight. CVP 35. Blood pressure stable with support. Remains intubated.  2/23: AF overnight, 3L UF HDD yesterday. Ongoing shock. Hepatocellular injury, worse.  2/24: PAF, HRs ok. +BCx, Hepatic dysfunction. Started on Flolan. On Amio, heparin and pressors still but MAP imroved.    Physical Exam   Blood pressure 128/90, pulse (!) 104, temperature 36.7 °C (98.1 °F), resp. rate 12, height 1.651 m (5' 5\"), weight (!) 250 kg (551 lb 2.4 oz), SpO2 99 %.    Constitutional: Extreme obesity. Appears well-developed.   HENT: Normocephalic and atraumatic. No scleral icterus.   Neck: Unable to assess JVD due to body habitus.  Cardiovascular: Tachycardic and irregular, distant heart sounds.rate. Exam reveals no gallop and no friction rub. No murmur heard.   Pulmonary/Chest: Coarse   Abdominal: S/NT/ND    Musculoskeletal:  Pulses present. No atrophy. Unable to assess strength.  Extremities: Exhibits 4+ BLE edema. No clubbing or cyanosis.   Skin: Skin is cool and dry.   Neuro: Non-focal, CN 2-12 unable to " assess       ROS: Unable to obtain, patient intubated and sedated unable to communicate effectively.      Intake/Output Summary (Last 24 hours) at 02/24/18 0751  Last data filed at 02/24/18 0600   Gross per 24 hour   Intake          3667.65 ml   Output             5030 ml   Net         -1362.35 ml       Recent Labs      02/22/18   0518  02/23/18   0320  02/24/18   0408   WBC  15.7*  16.2*  25.2*   RBC  4.22  4.40  4.56   HEMOGLOBIN  11.5*  12.1  12.6   HEMATOCRIT  38.8  40.5  42.4   MCV  91.9  92.0  93.0   MCH  27.3  27.5  27.6   MCHC  29.6*  29.9*  29.7*   RDW  50.4*  52.2*  52.8*   PLATELETCT  126*  114*  93*   MPV  11.7  12.1  13.3*     Recent Labs      02/22/18   0518  02/23/18   0526  02/24/18   0541   SODIUM  136  132*  130*   POTASSIUM  5.0  5.4  5.7*   CHLORIDE  100  100  96   CO2  21  17*  21   GLUCOSE  178*  113*  115*   BUN  40*  52*  57*   CREATININE  3.35*  4.43*  5.46*   CALCIUM  6.6*  6.2*  6.6*     Recent Labs      02/22/18   0518   02/23/18   0320   02/23/18   1300  02/23/18 2005 02/24/18   0205  02/24/18   0408   APTT   --    < >  91.0*   < >  111.4*  94.3*  73.7*   --    INR  3.48*   --   3.18*   --    --    --    --   3.18*    < > = values in this interval not displayed.         Recent Labs      02/22/18   0518 02/23/18   0320   TROPONINI  2.95*  2.50*           No current facility-administered medications on file prior to encounter.      No current outpatient prescriptions on file prior to encounter.       Current Facility-Administered Medications   Medication Dose Frequency Provider Last Rate Last Dose   • SODIUM CHLORIDE 0.9 % IV SOLN           • SODIUM CHLORIDE 0.9 % IV SOLN           • metoclopramide (REGLAN) injection 10 mg  10 mg Q6HRS Richie Agrawal M.D.   10 mg at 02/24/18 0626   • albumin human 25% solution 12.5 g  12.5 g DIALYSIS PRN Fiona Vasquez M.D. 150 mL/hr at 02/23/18 1337 12.5 g at 02/23/18 1337   • norepinephrine (LEVOPHED) 8 mg in D5W 250 mL Infusion  0-30 mcg/min  Continuous Olivia Jimenes M.D.   Stopped at 02/23/18 1405   • epoprostenol (FLOLAN) 1.5 mg in glycine diluent for flolan 50 mL for Inhalation  0.01-0.05 mcg/kg/min Continuous Olivia Jimenes M.D. 12 mL/hr at 02/24/18 0415 0.024 mcg/kg/min at 02/24/18 0415   • piperacillin-tazobactam (ZOSYN) IVPB premix 4.5 g  4.5 g Q12HRS Narinder Encarnacion M.D.   4.5 g at 02/23/18 2319   • phenylephrine (NAY-SYNEPHRINE) 80,000 mcg in D5W 250 mL Infusion  0-300 mcg/min Continuous Olivia Jimenes M.D. 22.5 mL/hr at 02/24/18 0739 120 mcg/min at 02/24/18 0739   • famotidine (PEPCID) tablet 20 mg  20 mg Q EVENING Olivia Jimenes M.D.   20 mg at 02/23/18 2036   • heparin injection 1,750 Units  1,750 Units DIALYSIS PRN Fiona Vasquez M.D.   1,750 Units at 02/23/18 1138   • heparin injection 3,000 Units  3,000 Units DIALYSIS PRN Fiona Vasquez M.D.   3,000 Units at 02/23/18 1415   • vasopressin (VASOSTRICT) 20 Units in  mL Infusion  0.03 Units/min Continuous Narinder Encarnacion M.D. 9 mL/hr at 02/23/18 1900 0.03 Units/min at 02/23/18 1900   • amiodarone (CORDARONE) 450 mg in D5W 250 mL Infusion  0.5 mg/min Continuous Olivia Jimenes M.D. 17 mL/hr at 02/23/18 2343 0.5 mg/min at 02/23/18 2343   • dexmedetomidine (PRECEDEX) 400 mcg in D5W 100 mL infusion  0.1-1.5 mcg/kg/hr Continuous Olivia Jimenes M.D. 25 mL/hr at 02/24/18 0739 0.4 mcg/kg/hr at 02/24/18 0739   • heparin injection 5,000 Units  5,000 Units PRN Richie Agrawal M.D.   Stopped at 02/21/18 1210    And   • heparin infusion 25,000 units in 500 ml 0.45% nacl   Continuous Richie Agrawal M.D. 11 mL/hr at 02/24/18 0435 550 Units/hr at 02/24/18 0435   • fentaNYL (SUBLIMAZE) injection 25 mcg  25 mcg Q HOUR PRN NAIMA RamirezO.        Or   • fentaNYL (SUBLIMAZE) injection 50 mcg  50 mcg Q HOUR PRN Reece Pratt D.O.        Or   • fentaNYL (SUBLIMAZE) injection 100 mcg  100 mcg Q HOUR PRN Reece Pratt D.O.       • ipratropium-albuterol (DUONEB)  nebulizer solution 3 mL  3 mL Q2HRS PRN (RT) Reeec Pratt D.O.       • ipratropium-albuterol (DUONEB) nebulizer solution 3 mL  3 mL Q4HRS (RT) NAIMA RamirezOSunil   3 mL at 02/24/18 0631   • insulin regular (HUMULIN R) injection 3-14 Units  3-14 Units Q6HRS TRUDI Ramirez.O.   Stopped at 02/19/18 1200    And   • glucose 4 g chewable tablet 16 g  16 g Q15 MIN PRN Reece Pratt D.OSunil        And   • dextrose 50% (D50W) injection 25 mL  25 mL Q15 MIN PRN NAIMA RamirezOSunil   25 mL at 02/23/18 0224   • senna-docusate (PERICOLACE or SENOKOT S) 8.6-50 MG per tablet 2 Tab  2 Tab BID Richie Agrawal M.D.   2 Tab at 02/23/18 2035    And   • polyethylene glycol/lytes (MIRALAX) PACKET 1 Packet  1 Packet QDAY PRN Richie Agrawal M.D.        And   • magnesium hydroxide (MILK OF MAGNESIA) suspension 30 mL  30 mL QDAY PRN Richie Agrawal M.D.   30 mL at 02/23/18 0836    And   • bisacodyl (DULCOLAX) suppository 10 mg  10 mg QDAY PRN Richie Agrawal M.D.       • acetaminophen (TYLENOL) tablet 650 mg  650 mg Q6HRS PRN Richie Agrawal M.D.       • NS (BOLUS) infusion 500 mL  500 mL Once PRN Richie Agrawal M.D.       • ondansetron (ZOFRAN) syringe/vial injection 4 mg  4 mg Q4HRS PRN Richie Agrawal M.D.       • ondansetron (ZOFRAN ODT) dispertab 4 mg  4 mg Q4HRS PRN Richie Agrawal M.D.       • promethazine (PHENERGAN) tablet 12.5-25 mg  12.5-25 mg Q4HRS PRN Richie Agrawal M.D.       • promethazine (PHENERGAN) suppository 12.5-25 mg  12.5-25 mg Q4HRS PRN Richie Agrawal M.D.       • prochlorperazine (COMPAZINE) injection 5-10 mg  5-10 mg Q4HRS PRN Richie Agrawal M.D.       • chlorhexidine (PERIDEX) 0.12 % solution 15 mL  15 mL BID Reece Pratt D.O.   15 mL at 02/23/18 2035   • lidocaine (XYLOCAINE) 1%  injection  1-2 mL Q30 MIN PRN Reece Pratt D.O.       • Respiratory Care per Protocol   Continuous RT Richie Agrawal M.D.         Last reviewed on  2/19/2018  3:59 PM by Ryan Correa R.N.  Medications reviewed    Imaging reviewed    EKG (2/19/2018):  I have personally reviewed the EKG this visit and discussed with the patient. It shows , low voltage.     ECHO CONCLUSIONS (2/19/2018):  No prior study is available for comparison.   Poor visualization of all cardiac structures due to body habitus   despite contrast use.  Severely reduced left ventricular systolic function by gross   evaluation.  Flattened septum in systole and diastole consistent with RV pressure   and volume overload.  Severely dilated right ventricle.  Reduced right ventricular systolic function.  Valves not seen.       Impressions:  1. Hypoxic and hypercarbic respiratory failure  2. Mixed cardiogenic and possibly obstructive shock  3. Lactic acidosis  4. Extreme obesity (BMI 92)  5. Severe biventricular systolic dysfunction  6. Acutely decompensated congestive heart failure, CVP 36  7. JAYSON, anuric now on new renal replacement therapy  8. Possible pulmonary embolism  9. Systemic anticoagulation with heparin  10. Possible aspiration pneumonia  11. Atrial fibrillation  12. Incomplete database  13. Documented medical noncompliance  14. Hepatocellular injury, worsening  15. MultiOrgan system failure  16. Bacteremia, ABx asdjusted     Recommendations:  1. Agree with continued renal replacement therapy for metabolic acidosis and volume management. Recommend aggressive ultrafiltration as tolerated hemodynamically.  2. Continue vasopressor and inotropic support  3. IV heparin gtt ongoing   4. No BB, ACEI due to renal and hemodynamic compromise.  5. HR acceptable for degree of hemodynamic impairment. Amiodarone therapy suboptimal due to long duration of AF and no prior anticoagulation, however not able to tolerate other rate modifying mediations at this time.  6. Should HRs increase, recommend judicious digoxin administration with levels given JAYSON.  7. Continue flolan for severe RV dysfunction  and likely PH to improve forward cardiac flow.    New recommendations today:  Consider DC amiodarone due to sufficient loading, persistent AF and hepatocellular injury    Prognosis remains grim.    Thank you for this interesting consultation. It was my pleasure to see Davina Franco today.     Josh Montano MD, FACC, [facility]  Division of Interventional Cardiology  [facility]

## 2018-02-25 PROBLEM — D69.6 THROMBOCYTOPENIA (HCC): Status: ACTIVE | Noted: 2018-01-01

## 2018-02-25 PROBLEM — E87.20 METABOLIC ACIDOSIS: Status: RESOLVED | Noted: 2018-01-01 | Resolved: 2018-01-01

## 2018-02-25 NOTE — PROGRESS NOTES
"Interval History:  47F with extreme obesity (BMI 92), atrial fibrillation, biventricular systolic heart failure, and medical noncompliance admitted for dyspnea and hypoxemia in the setting of recent influenza pneumonia. She has been intubated and is sedated, unable to participate in the interview. Noted to have AFRVR with HRs now improving on therapy. Subsequently she has remained hypoxic and hypercarbic with new acute renal failure and mixed metabolic and respiratory acidosis and shock on high dose vasopressors including norepinephrine, vasopressin and phenylephrine.     2/21: Remains ventilated, persistent lactic acidosis, and progressive leukocytosis and multiorgan system dysfunction. Remains atrial fibrillation with physiologically appropriate rates in the low 100s. CVP climbing now up to 50. Grossly volume overloaded. Urine output nearly anuric.  2/22: Initiated on renal replacement therapy with 3 L ultrafiltration overnight. CVP 35. Blood pressure stable with support. Remains intubated.  2/23: AF overnight, 3L UF HDD yesterday. Ongoing shock. Hepatocellular injury, worse.  2/24: PAF, HRs ok. +BCx, Hepatic dysfunction. Started on Flolan. On Amio, heparin and pressors still but MAP imroved.  2/25: Persistent atrial fibrillation. Heart rate somewhat higher than expected after discontinuing amiodarone for multifactorial liver dysfunction. Continued good ultrafiltration rates on dialysis. CVP down to 28.    Physical Exam   Blood pressure 128/90, pulse 69, temperature 37.2 °C (98.9 °F), resp. rate 19, height 1.651 m (5' 5\"), weight (!) 250 kg (551 lb 2.4 oz), SpO2 100 %.    Constitutional: Extreme obesity. Appears well-developed.   HENT: Normocephalic and atraumatic. No scleral icterus.   Neck: Unable to assess JVD due to body habitus.  Cardiovascular: Tachycardic and irregular, distant heart sounds.rate. Exam reveals no gallop and no friction rub. No murmur heard.   Pulmonary/Chest: Coarse   Abdominal: S/NT/ND  "   Musculoskeletal:  Pulses present. No atrophy. Unable to assess strength.  Extremities: Exhibits 4+ BLE edema. No clubbing or cyanosis.   Skin: Skin is cool and dry.   Neuro: Non-focal, CN 2-12 unable to assess       ROS: Unable to obtain, patient intubated and sedated unable to communicate effectively.      Intake/Output Summary (Last 24 hours) at 02/25/18 0852  Last data filed at 02/25/18 0600   Gross per 24 hour   Intake          2486.51 ml   Output             4730 ml   Net         -2243.49 ml       Recent Labs      02/23/18   0320 02/24/18   0408  02/25/18   0415   WBC  16.2*  25.2*  21.1*   RBC  4.40  4.56  4.36   HEMOGLOBIN  12.1  12.6  12.1   HEMATOCRIT  40.5  42.4  40.0   MCV  92.0  93.0  91.7   MCH  27.5  27.6  27.8   MCHC  29.9*  29.7*  30.3*   RDW  52.2*  52.8*  52.0*   PLATELETCT  114*  93*  75*   MPV  12.1  13.3*  12.4     Recent Labs      02/23/18   0526  02/24/18   0541  02/25/18   0415   SODIUM  132*  130*  130*   POTASSIUM  5.4  5.7*  6.0*   CHLORIDE  100  96  95*   CO2  17*  21  21   GLUCOSE  113*  115*  124*   BUN  52*  57*  63*   CREATININE  4.43*  5.46*  5.39*   CALCIUM  6.2*  6.6*  6.7*     Recent Labs      02/23/18   0320 02/23/18 2005 02/24/18 0205 02/24/18 0408  02/25/18   0415   APTT  91.0*   < >  94.3*  73.7*   --   50.7*   INR  3.18*   --    --    --   3.18*  2.56*    < > = values in this interval not displayed.         Recent Labs      02/23/18 0320   TROPONINI  2.50*           No current facility-administered medications on file prior to encounter.      No current outpatient prescriptions on file prior to encounter.       Current Facility-Administered Medications   Medication Dose Frequency Provider Last Rate Last Dose   • riFAXIMin (XIFAXAN) tablet 550 mg  550 mg BID Richie Agrawal M.D.   550 mg at 02/25/18 0801   • albumin human 25% solution 12.5 g  12.5 g DIALYSIS PRN Fiona Vasquez M.D. 150 mL/hr at 02/24/18 1233 12.5 g at 02/24/18 1233   • norepinephrine  (LEVOPHED) 8 mg in D5W 250 mL Infusion  0-30 mcg/min Continuous Olivia Jimenes M.D.   Stopped at 02/23/18 1405   • epoprostenol (FLOLAN) 1.5 mg in glycine diluent for flolan 50 mL for Inhalation  0.01-0.05 mcg/kg/min Continuous Olivia Jimenes M.D. 12 mL/hr at 02/25/18 0440 0.024 mcg/kg/min at 02/25/18 0440   • piperacillin-tazobactam (ZOSYN) IVPB premix 4.5 g  4.5 g Q12HRS Narinder Encarnacion M.D.   4.5 g at 02/25/18 0801   • phenylephrine (NAY-SYNEPHRINE) 80,000 mcg in D5W 250 mL Infusion  0-300 mcg/min Continuous Olivia Jimenes M.D. 28.1 mL/hr at 02/25/18 0644 150 mcg/min at 02/25/18 0644   • famotidine (PEPCID) tablet 20 mg  20 mg Q EVENING Olivia Jimenes M.D.   20 mg at 02/24/18 2039   • heparin injection 1,750 Units  1,750 Units DIALYSIS PRN Fiona Vasquez M.D.   1,750 Units at 02/23/18 1138   • heparin injection 3,000 Units  3,000 Units DIALYSIS PRN Fiona Vasquez M.D.   3,000 Units at 02/24/18 1415   • vasopressin (VASOSTRICT) 20 Units in  mL Infusion  0.03 Units/min Continuous Nrainder Encarnacion M.D. 9 mL/hr at 02/24/18 2334 0.03 Units/min at 02/24/18 2334   • dexmedetomidine (PRECEDEX) 400 mcg in D5W 100 mL infusion  0.1-1.5 mcg/kg/hr Continuous Olivia Jimenes M.D. 43.8 mL/hr at 02/25/18 0736 0.7 mcg/kg/hr at 02/25/18 0736   • heparin injection 5,000 Units  5,000 Units PRN Richie Agrawal M.D.   5,000 Units at 02/25/18 0833    And   • heparin infusion 25,000 units in 500 ml 0.45% nacl   Continuous Richie Agrawal M.D. 15 mL/hr at 02/25/18 0832 750 Units/hr at 02/25/18 0832   • fentaNYL (SUBLIMAZE) injection 25 mcg  25 mcg Q HOUR PRN Reece Pratt D.O.        Or   • fentaNYL (SUBLIMAZE) injection 50 mcg  50 mcg Q HOUR PRN Reece Pratt D.O.        Or   • fentaNYL (SUBLIMAZE) injection 100 mcg  100 mcg Q HOUR PRN Reece Pratt D.O.       • ipratropium-albuterol (DUONEB) nebulizer solution 3 mL  3 mL Q2HRS PRN (RT) Reece Pratt D.O.       • ipratropium-albuterol  (DUONEB) nebulizer solution 3 mL  3 mL Q4HRS (RT) NAIMA RamirezOSunil   3 mL at 02/25/18 0635   • insulin regular (HUMULIN R) injection 3-14 Units  3-14 Units Q6HRS TRUDI Ramirez.OSunil   Stopped at 02/19/18 1200    And   • glucose 4 g chewable tablet 16 g  16 g Q15 MIN PRN Reece Pratt D.O.        And   • dextrose 50% (D50W) injection 25 mL  25 mL Q15 MIN PRN NAIMA RamirezOSunil   25 mL at 02/23/18 0224   • senna-docusate (PERICOLACE or SENOKOT S) 8.6-50 MG per tablet 2 Tab  2 Tab BID Richie Agrawal M.D.   2 Tab at 02/24/18 2039    And   • polyethylene glycol/lytes (MIRALAX) PACKET 1 Packet  1 Packet QDAY PRN Richie Agrawal M.D.        And   • magnesium hydroxide (MILK OF MAGNESIA) suspension 30 mL  30 mL QDAY PRN Richie Agrawal M.D.   30 mL at 02/23/18 0836    And   • bisacodyl (DULCOLAX) suppository 10 mg  10 mg QDAY PRN Richie Agrawal M.D.       • acetaminophen (TYLENOL) tablet 650 mg  650 mg Q6HRS PRN Richie Agrawal M.D.       • NS (BOLUS) infusion 500 mL  500 mL Once PRN Richie Agrawal M.D.       • ondansetron (ZOFRAN) syringe/vial injection 4 mg  4 mg Q4HRS PRN Richie Agrawal M.D.       • ondansetron (ZOFRAN ODT) dispertab 4 mg  4 mg Q4HRS PRN Richie Agrawal M.D.       • promethazine (PHENERGAN) tablet 12.5-25 mg  12.5-25 mg Q4HRS PRN Richie Agrawal M.D.       • promethazine (PHENERGAN) suppository 12.5-25 mg  12.5-25 mg Q4HRS PRN Richie Agrawal M.D.       • prochlorperazine (COMPAZINE) injection 5-10 mg  5-10 mg Q4HRS PRN Richie Agrawal M.D.       • chlorhexidine (PERIDEX) 0.12 % solution 15 mL  15 mL BID TRUDI Ramirez.O.   15 mL at 02/25/18 0801   • lidocaine (XYLOCAINE) 1%  injection  1-2 mL Q30 MIN PRN Reece Pratt D.O.       • Respiratory Care per Protocol   Continuous RT Richie Agrawal M.D.         Last reviewed on 2/19/2018  3:59 PM by Ryan Correa R.N.  Medications reviewed    Imaging reviewed    EKG  (2/19/2018):  I have personally reviewed the EKG this visit and discussed with the patient. It shows , low voltage.     ECHO CONCLUSIONS (2/19/2018):  No prior study is available for comparison.   Poor visualization of all cardiac structures due to body habitus   despite contrast use.  Severely reduced left ventricular systolic function by gross   evaluation.  Flattened septum in systole and diastole consistent with RV pressure   and volume overload.  Severely dilated right ventricle.  Reduced right ventricular systolic function.  Valves not seen.       Impressions:  1. Hypoxic and hypercarbic respiratory failure  2. Mixed cardiogenic and possibly obstructive shock  3. Lactic acidosis  4. Extreme obesity (BMI 92)  5. Severe biventricular systolic dysfunction  6. Acutely decompensated congestive heart failure, CVP 36  7. JAYSON, anuric now on new renal replacement therapy  8. Possible pulmonary embolism  9. Systemic anticoagulation with heparin  10. Possible aspiration pneumonia  11. Atrial fibrillation  12. Incomplete database  13. Documented medical noncompliance  14. Hepatocellular injury, worsening  15. MultiOrgan system failure  16. Bacteremia, ABx asdjusted     Recommendations:  1. Agree with continued renal replacement therapy for metabolic acidosis and volume management. Recommend aggressive ultrafiltration as tolerated hemodynamically.  2. Continue vasopressor and inotropic support  3. IV heparin gtt ongoing   4. No BB, ACEI due to renal and hemodynamic compromise.  5. Heart rates somewhat more elevated since discontinuing amiodarone for hepatic dysfunction, consider every other day digoxin therapy given her dialysis dependence.  7. Continue flolan for severe RV dysfunction and likely PH to improve forward cardiac flow.    New recommendations today:  Consider Digoxin 0.125mg IVx1 then 0.0625mg qOD for HR given HDD    Prognosis remains grim.    Thank you for this interesting consultation. It was my pleasure  to see Davina Franco today.     Josh Montano MD, FACC, [facility]  Division of Interventional Cardiology  [facility]

## 2018-02-25 NOTE — PROGRESS NOTES
[facility] Hospitalist Progress Note    Date of Service: 2018    Chief Complaint  47 y.o. female admitted 2018 with Afib anasarca and acute respiratory failure, hx of non compliance with meds    Interval Problem Update    Remains in A. fib with increased heart rate does not follow command to withdraw to pain  And ammonia 244 still with absent bowel sounds  G in place with 150 mL out  Remains on heparin drip and pressors Drake-Synephrine and vasopressin  Potassium 6.0      Consultants/Specialty  Dr Jimenes   cardiologynephrology    Disposition  ICU        Review of Systems   Unable to perform ROS: Intubated      Physical Exam  Laboratory/Imaging   Hemodynamics  Temp (24hrs), Av.1 °C (98.7 °F), Min:36.8 °C (98.2 °F), Max:37.2 °C (99 °F)   Temperature: 37.2 °C (99 °F)  Pulse  Av.7  Min: 32  Max: 141 Heart Rate (Monitored): (!) 149  Arterial BP: 100/58 CVP (mm Hg): (!) 29 MM HG    Respiratory  Schreiber Vent Mode: APRV, FiO2: 50, P Peak (PIP): 32, P MEAN: 27   Respiration: 19, Pulse Oximetry: 100 %     #Flolan: Yes, Work Of Breathing / Effort: Vented  RUL Breath Sounds: Diminished, RML Breath Sounds: Diminished, RLL Breath Sounds: Diminished, TEJAS Breath Sounds: Diminished, LLL Breath Sounds: Diminished    Fluids    Intake/Output Summary (Last 24 hours) at 18 1508  Last data filed at 18 0800   Gross per 24 hour   Intake           1657.6 ml   Output              230 ml   Net           1427.6 ml       Nutrition  Orders Placed This Encounter   Procedures   • Diet NPO     Standing Status:   Standing     Number of Occurrences:   1     Order Specific Question:   Type:     Answer:   Now [1]     Order Specific Question:   Restrict to:     Answer:   Strict [1]     Physical Exam   Constitutional: She appears well-developed.   Morbidly obese   HENT:   Head: Normocephalic.   Right Ear: External ear normal.   Left Ear: External ear normal.   Eyes: Right eye exhibits no discharge. Left eye exhibits no  discharge. Scleral icterus is present.   Neck: Neck supple. No JVD present. No tracheal deviation present.   Cardiovascular: An irregularly irregular rhythm present. Tachycardia present.  Exam reveals no gallop and no friction rub.    No murmur heard.  Pulmonary/Chest: No stridor. No respiratory distress. She has decreased breath sounds. She has no wheezes. She has rhonchi. She has rales. She exhibits no tenderness.   Intubated   Abdominal: Soft. Bowel sounds are normal. She exhibits distension. There is no tenderness. There is no rebound and no guarding.   Obese   Musculoskeletal: She exhibits edema (severe generalized). She exhibits no tenderness.   Neurological:   Obtunded  Does not follow command     Skin: Skin is warm and dry. She is not diaphoretic. No erythema. Nails show no clubbing.   Cyanotic toes   Psychiatric: She is slowed. Cognition and memory are impaired.   Nursing note and vitals reviewed.      Recent Labs      02/23/18 0320 02/24/18 0408 02/25/18 0415   WBC  16.2*  25.2*  21.1*   RBC  4.40  4.56  4.36   HEMOGLOBIN  12.1  12.6  12.1   HEMATOCRIT  40.5  42.4  40.0   MCV  92.0  93.0  91.7   MCH  27.5  27.6  27.8   MCHC  29.9*  29.7*  30.3*   RDW  52.2*  52.8*  52.0*   PLATELETCT  114*  93*  75*   MPV  12.1  13.3*  12.4     Recent Labs      02/23/18   0526 02/24/18   0541  02/25/18   0415   SODIUM  132*  130*  130*   POTASSIUM  5.4  5.7*  6.0*   CHLORIDE  100  96  95*   CO2  17*  21  21   GLUCOSE  113*  115*  124*   BUN  52*  57*  63*   CREATININE  4.43*  5.46*  5.39*   CALCIUM  6.2*  6.6*  6.7*     Recent Labs      02/23/18   0320 02/23/18 2005 02/24/18   0205 02/24/18 0408  02/25/18   0415   APTT  91.0*   < >  94.3*  73.7*   --   50.7*   INR  3.18*   --    --    --   3.18*  2.56*    < > = values in this interval not displayed.                  Assessment/Plan     Thrombocytopenia (CMS-HCC)   Assessment & Plan    Likely secondary to sepsis and possible DIC  No signs of active  bleed  Check HIT serology        Ileus (CMS-HCC)   Assessment & Plan    Continue to hold tube feeding  NG to suction  Continue Reglan  Close clinical monitoring  Patient is unstable for CT or any invasive interventions        Acute kidney injury (CMS-HCC)   Assessment & Plan    Likely ATN secondary to sepsis and shock  Pt is anuric  Continue daily hemodialysis as tolerated   discussed with Dr Vasquez    Will give 1 amp of IV bicarbonate for hyperkalemia          Sepsis (CMS-HCC)- (present on admission)   Assessment & Plan    This is severe sepsis with the following associated acute organ dysfunction(s): acute kidney failure.metabolic encephalopathy    Continue Zosyn and pressors follow up on final blood culture results        Chronic atrial fibrillation (CMS-HCC)   Assessment & Plan    On anticoagulation  Amiodarone stopped given worsening LFTs  Cardiology following          Cardiomyopathy (CMS-HCC)   Assessment & Plan    EF20%  Given shock and renal failure no BB or ACEI  Continue fluid management with hemodialysis          Shock (CMS-HCC)   Assessment & Plan    Likely cardiogenic andseptic  On Drake-Synephrine vasopressin   Wean off as tolerated   cortisol level 25.2          Elevated LFTs   Assessment & Plan    With liver failure likely secondary to sepsis and hepatic congestion   US consistent with steatosis , hepatitis serology was negative   Monitor LFT's   Continue rifaximin  Start lactulose when ileus improved        Acute metabolic encephalopathy   Assessment & Plan    hepatic encephalopathy    With ileus unable to tolerate oral lactulose and too unstable for repositioning for rectal lactulose  Continue rifaximin and monitor clinically        Anasarca   Assessment & Plan    Patient is anuric continue  Fluid management with hemodialysis        Cor pulmonale (CMS-HCC)   Assessment & Plan    Heparin drip for Afib and also possible PE given RV strain on echo   patient unable to tolerate CT of chest due to renal  failure and critical illness  Continue flolan  Discussed with Dr. Jimenes        PNA (pneumonia)   Assessment & Plan    Sputum Cultures negative   Blood culture 1 out of 2 coag-negative staph likely contaminant and 1/2 GNB at day 5  Continue Zosyn and follow up on final culture results and sensitivities        Morbid obesity (CMS-HCC)   Assessment & Plan    Body mass index is 91.72 kg/m².         Acute respiratory failure (CMS-HCC)   Assessment & Plan    Vent management per critical care discussed with Dr Jimenes continue Shahablan  Still with high vent support requirement              Quality-Core Measures   Reviewed items::  Labs reviewed, Medications reviewed and Radiology images reviewed  Crooks catheter::  Critically Ill - Requiring Accurate Measurement of Urinary Output  Central line in place:  Vasopressors  DVT prophylaxis pharmacological::  Heparin  Antibiotics:  Treating active infection/contamination beyond 24 hours perioperative coverage        Overall prognosis is guarded, daughter supposed to arrive today I asked nursing staff to notify me to have family conference to discuss goals of care

## 2018-02-25 NOTE — PROGRESS NOTES
Nephrology Progress Note, Adult, Complex               Author: Fiona Pelayomariahanson Date & Time created: 2/25/2018  11:15 AM     Interval History:  48 y/o female with multiple medical problems, morbidly obese  In JAYSON, septic shock/anuric, liver failure  Resp failure: intubated/vent -hypoxic  On pressors  On daily dialysis  Holding dialysis for now -awaiting family conference    Review of Systems:  Review of Systems   Reason unable to perform ROS: intubated.       Physical Exam:  Physical Exam   Constitutional: She appears well-developed. No distress.   morbidly   HENT:   Head: Normocephalic and atraumatic.   ETT   Eyes: Conjunctivae are normal. Pupils are equal, round, and reactive to light. No scleral icterus.   Cardiovascular: Normal rate and regular rhythm.  Exam reveals no gallop and no friction rub.    Pulmonary/Chest: She has no wheezes. She has rales.   Coarse BS  vented   Abdominal: Soft. Bowel sounds are normal. She exhibits distension.   Musculoskeletal: She exhibits edema.   Anasarca   Neurological:   sedated   Nursing note and vitals reviewed.      Labs:  Recent Labs      02/23/18   0628  02/24/18   0415  02/25/18   0537   ISTATAPH  7.414  7.317*  7.343*   ISTATAPCO2  31.1  43.9*  41.5*   ISTATAPO2  71  197*  93*   ISTATATCO2  21  24  24   [id number]  95  100*  97   ISTATARTHCO3  19.9  22.4  22.6   ISTATARTBE  -4  -4  -3   ISTATTEMP  98.6 F  98.1 F  98.3 F   ISTATFIO2  90  90  60   ISTATSPEC  Arterial  Arterial  Arterial   ISTATAPHTC  7.414  7.320*  7.345*   [id number]  71  195*  92*     Recent Labs      02/23/18   0320   TROPONINI  2.50*     Recent Labs      02/23/18   0526  02/24/18   0541  02/25/18   0415   SODIUM  132*  130*  130*   POTASSIUM  5.4  5.7*  6.0*   CHLORIDE  100  96  95*   CO2  17*  21  21   BUN  52*  57*  63*   CREATININE  4.43*  5.46*  5.39*   CALCIUM  6.2*  6.6*  6.7*     Recent Labs      02/23/18   0526  02/24/18   0541  02/24/18   0954  02/25/18   0415   [facility]   --   1671[facility]   --     ASTSGOT  2520*   --   1013*   --    ALKPHOSPHAT  222*   --   194*   --    TBILIRUBIN  2.5*   --   3.2*   --    DBILIRUBIN   --    --   1.8*   --    GLUCOSE  113*  115*   --   124*     Recent Labs      18   [id number]  18   0415   RBC  4.40   --    --    --   4.56  4.36   HEMOGLOBIN  12.1   --    --    --   12.6  12.1   HEMATOCRIT  40.5   --    --    --   42.4  40.0   PLATELETCT  114*   --    --    --   93*  75*   PROTHROMBTM  32.3*   --    --    --   32.3*  27.2*   APTT  91.0*   < >  94.3*  73.7*   --   50.7*   INR  3.18*   --    --    --   3.18*  2.56*    < > = values in this interval not displayed.     Recent Labs      18   0954  18   0415   WBC  16.2*   --   25.2*   --   21.1*   NEUTSPOLYS  73.50*   --   76.80*   --   75.60*   LYMPHOCYTES  12.70*   --   10.70*   --   7.70*   MONOCYTES  12.30   --   9.80   --   15.10*   EOSINOPHILS  0.00   --   0.00   --   0.10   BASOPHILS  0.20   --   0.00   --   0.40   ASTSGOT   --   2520*   --   1013*   --    ALTSGPT   --   2837*   --   1671*   --    ALKPHOSPHAT   --   222*   --   194*   --    TBILIRUBIN   --   2.5*   --   3.2*   --            Hemodynamics:  Temp (24hrs), Av °C (98.6 °F), Min:36.7 °C (98.1 °F), Max:37.2 °C (99 °F)  Temperature: 37.2 °C (99 °F)  Pulse  Av.7  Min: 32  Max: 141Heart Rate (Monitored): (!) 144  Arterial BP: 100/58  CVP (mm Hg): (!) 29 MM HG  Respiratory:  Schreiber Vent Mode: APRV, FiO2: 50, P Peak (PIP): 31, P MEAN: 28 Respiration: 19, Pulse Oximetry: 99 %     #Flolan: Yes, Work Of Breathing / Effort: Vented  RUL Breath Sounds: Diminished, RML Breath Sounds: Diminished, RLL Breath Sounds: Diminished, TEJAS Breath Sounds: Diminished, LLL Breath Sounds: Diminished  Fluids:    Intake/Output Summary (Last 24 hours) at 18 1115  Last data filed at 18 0600   Gross per 24 hour   Intake          2367.51 ml   Output              4730 ml   Net         -2362.49 ml        GI/Nutrition:  Orders Placed This Encounter   Procedures   • Diet NPO     Standing Status:   Standing     Number of Occurrences:   1     Order Specific Question:   Type:     Answer:   Now [1]     Order Specific Question:   Restrict to:     Answer:   Strict [1]     Medical Decision Making, by Problem:  Active Hospital Problems    Diagnosis   • Acute kidney injury (CMS-HCC) [N17.9]   • Shock (CMS-HCC) [R57.9]   • Cardiomyopathy (CMS-HCC) [I42.9]   • Chronic atrial fibrillation (CMS-HCC) [I48.2]   • Sepsis (CMS-HCC) [A41.9]   • Metabolic acidosis [E87.2]   • Acute respiratory failure (CMS-HCC) [J96.00]   • Morbid obesity (CMS-HCC) [E66.01]   • PNA (pneumonia) [J18.9]   • Cor pulmonale (CMS-HCC) [I27.81]   • Anasarca [R60.1]   • Acute metabolic encephalopathy [G93.41]   • Elevated LFTs [R79.89]       Quality-Core Measures   Reviewed items::  Labs reviewed and Medications reviewed    Assessment and plan  1.JAYSON/ATN/HD -on daily HD -seen and examined during dialysis -please see dialysis flow sheet for details  2.Hypotension /shock -on 2 pressors  3.Electrolytes: hypocalcemia -increased calcium bath  4.Anemia: Hb WNL  5.Metabolic acidosis -corrected with HD   6.Volume:UF goal 3.5-4.5 L -yesterday 4 L    Recs: holding dialysis for now -awaiting family decision regarding future treatments             With very poor prognosis             Daily BMP             Keep MAP> 65

## 2018-02-25 NOTE — CARE PLAN
Problem: Safety  Goal: Will remain free from falls  Outcome: PROGRESSING AS EXPECTED      Problem: Bowel/Gastric:  Goal: Normal bowel function is maintained or improved  Outcome: PROGRESSING SLOWER THAN EXPECTED      Problem: Fluid Volume:  Goal: Will maintain balanced intake and output  Outcome: PROGRESSING SLOWER THAN EXPECTED

## 2018-02-25 NOTE — CARE PLAN
Problem: Ventilation Defect:  Goal: Ability to achieve and maintain unassisted ventilation or tolerate decreased levels of ventilator support  Outcome: PROGRESSING AS EXPECTED  Adult Ventilation Update    Total Vent Days: 7  APRV PHigh: 28 PLow: 0 THigh: 4.5 TLow: 0.5    Patient Lines/Drains/Airways Status    Active Airway     Name: Placement date: Placement time: Site: Days:    Airway Group ET Tube Oral 8.0 02/19/18   1116   Oral   7               Plateau Pressure (Q Shift): 33 (02/20/18 0628)  Static Compliance (ml / cm H2O): 40.6 (02/25/18 1414)    Sputum/Suction   Cough: Non Productive (02/25/18 1414)  Sputum Amount: Unable to Evaluate (02/25/18 1414)  Sputum Color: Unable to Evaluate (02/25/18 1414)  Sputum Consistency: Unable to Evaluate (02/25/18 1414)    Events/Summary/Plan: Flolan check. No vent changes made at this time (02/25/18 1414)

## 2018-02-25 NOTE — PROGRESS NOTES
Pulmonary Critical Care Progress Note        Date of Service:  2/25/2018  Chief Complaint: Worsening cough and dyspnea    History of Present Illness: 47 y.o. female admitted for acute hypoxic respiratory failure likely due to biventricular failure and cardiogenic shock.     ROS: Unable to obtain as the patient is on the ventilator and sedated  Interval Events:  24 hour interval history reviewed    - no events overnight   - still with corneal/gag/cough, no response to noxious stimuli   - 120-160s in a-fib   - amio off   - CVP 30   - SBP 90-100s   - BM PTA   - OG of 150cc that appears like stool   - UOP of 20cc   - rifaximin started with ammonia level to 266   - precedex at 0.7   - adolfo at 150   - vaso at 0.03   - levo is off   - vent day #7   - APRV with FiO2 at 50%   - CXR(reviewed): worsening pulmonary edema   - day #3 of zosyn with GNR in BCx1 and Staph epi in the other   - HD yesterday with 4.5 liters today   - platelets to 75    Yesterday's Events:   - no events overnight   - with any movement will become hypotensive   - HD yesterday with 5 liters removed   - no response to stimuli, (+)gag/corneals/cough   - Ammonia level of 344   - a-fib 100   - SBP 90-100s   - CVP 30   -adolfo at 120   - vaso at 0.03   - amio at 0.5   - heparin drip   - BCx2 with GNR   - not tolerating TFs   - OG with bloody gastric contents of 300cc   - no BM since arrival   - UOP of 30cc for 24 hours   - not mobilization   - vent day #6   - FiO2 at 60%   - flolan at 12cc/hr   - CXR(reviewed): improving bilat pulm edema      PFSH:  No change.    Respiratory:  Schreiber Vent Mode: APRV, FiO2: 60, Static Compliance (ml / cm H2O): 36.4, Control VTE (exp VT): 264  Pulse Oximetry: 99 %    Exam: coarse throughout, no wheezing, very little air movement (no changes)  ImagingAvailable data reviewed   Recent Labs      02/23/18 0424  02/23/18   0628  02/24/18   0415   ISTATAPH  7.425  7.414  7.317*   ISTATAPCO2  34.5  31.1  43.9*   ISTATAPO2  72  71  197*    ISTATATCO2  24  21  24   ARYHVYR5FCM  95  95  100*   ISTATARTHCO3  22.7  19.9  22.4   ISTATARTBE  -1  -4  -4   ISTATTEMP  98.4 F  98.6 F  98.1 F   ISTATFIO2  90  90  90   ISTATSPEC  Arterial  Arterial  Arterial   ISTATAPHTC  7.427  7.414  7.320*   RHOMUOPR0CI  72  71  195*       HemoDynamics:  Pulse: 60, Heart Rate (Monitored): (!) 135  Arterial BP: (!) 96/60  CVP (mm Hg): (!) 35 MM HG    Exam:rapid irregularly irregular, no murmur, 4+ pitting pedal edema throughout body, 1+ dpp=, delayed cap refill by 4-5 seconds, continued worsening duskiness to toes  (no changes)  Imaging: Available data reviewed   ECHO 2/19:  No prior study is available for comparison.   Poor visualization of all cardiac structures due to body habitus   despite contrast use.  Severely reduced left ventricular systolic function by gross   evaluation.  Flattened septum in systole and diastole consistent with RV pressure   and volume overload.  Severely dilated right ventricle.  Reduced right ventricular systolic function.  Valves not seen.  Recent Labs      02/22/18   0518  02/23/18   0320   TROPONINI  2.95*  2.50*       Neuro:  GCS Total [place] Coma Score: 7       Exam: lightly sedated, no response to noxious stimuli, not moving any extremities and not grimacing to painful stimuli, (+)cough/gag/corneal  Imaging: Available data reviewed    Fluids:  Intake/Output       02/23/18 0700 - 02/24/18 0659 02/24/18 0700 - 02/25/18 0659 02/25/18 0700 - 02/26/18 0659      0221-3484 6645-4849 Total 8276-8580 4099-4301 Total 3154-7201 7689-5457 Total       Intake    I.V.  1555.6  1502.1 3057.7  1108  635.1 1743.1  --  -- --    Precedex Volume 675.6 674 1349.6 337.5 300 637.5 -- -- --    Amiodarone Volume 204 204 408 170 -- 170 -- -- --    Vasopressin Volume 108 108 216 108 72 180 -- -- --    Phenylephrine Volume 378.1 384.1 762.2 260.5 75.1 335.6 -- -- --    Norepinephrine Volume 14.9 -- 14.9 -- -- -- -- -- --    Heparin Volume 175 132 307 132 88 220 -- --  --    IV Piggyback Volume (albumin 25%) -- -- -- 100 -- 100 -- -- --    IV Volume (ZOSYN) -- -- -- -- 100 100 -- -- --    Other  --  30 30  --  30 30  --  -- --    Medications (P.O./ Enteral Liquids) -- 30 30 -- 30 30 -- -- --    Dialysis  500  -- 500  400  -- 400  --  -- --    Dialysis Volume (Dialysis Intake) 500 -- 500 400 -- 400 -- -- --    Enteral  20  60 80  --  90 90  --  -- --    Enteral Volume 20 -- 20 -- -- -- -- -- --    Free Water / Tube Flush -- 60 60 -- 90 90 -- -- --    Total Intake 2075.6 1592.1 3667.7 1508 755.1 2263.1 -- -- --       Output    Urine  --  30 30  30  -- 30  --  -- --    Indwelling Cathether -- 30 30 -- -- -- -- -- --    Void (ml) -- -- -- 30 -- 30 -- -- --    Drains  --  -- --  50  -- 50  --  -- --    Nasogastric Tube -- -- -- 50 -- 50 -- -- --    Dialysis  5000  -- 5000  4500  -- 4500  --  -- --    Dialysis Output (Dialysis Output) 5000 -- 5000 4500 -- 4500 -- -- --    Total Output 5000 30 5030 4580 -- 4580 -- -- --       Net I/O     -2924.5 1562.1 -1362.4 -3072 755.1 -2316.9 -- -- --           Recent Labs      02/22/18   0518  02/23/18   0526  02/24/18   0541   SODIUM  136  132*  130*   POTASSIUM  5.0  5.4  5.7*   CHLORIDE  100  100  96   CO2  21  17*  21   BUN  40*  52*  57*   CREATININE  3.35*  4.43*  5.46*   MAGNESIUM  1.9   --    --    PHOSPHORUS  6.3*   --    --    CALCIUM  6.6*  6.2*  6.6*       GI/Nutrition:  Exam:morbily obese, anasarca, hypoactive bowel sounds, no obvious tenderness, distended (unchanged)  Imaging: Available data reviewed  TFs stopped since pt not tolerating  Liver Function  Recent Labs      02/22/18   0518  02/23/18   0526  02/24/18   0541  02/24/18   0954   ALTSGPT   --   2837*   --   1671*   ASTSGOT   --   2520*   --   1013*   ALKPHOSPHAT   --   222*   --   194*   TBILIRUBIN   --   2.5*   --   3.2*   DBILIRUBIN   --    --    --   1.8*   GLUCOSE  178*  113*  115*   --        Heme:  Recent Labs      02/22/18   0518   02/23/18   0320   02/23/18   1300   188   RBC  4.22   --   4.40   --    --    --    --   4.56   HEMOGLOBIN  11.5*   --   12.1   --    --    --    --   12.6   HEMATOCRIT  38.8   --   40.5   --    --    --    --   42.4   PLATELETCT  126*   --   114*   --    --    --    --   93*   PROTHROMBTM  34.7*   --   32.3*   --    --    --    --   32.3*   APTT   --    < >  91.0*   < >  111.4*  94.3*  73.7*   --    INR  3.48*   --   3.18*   --    --    --    --   3.18*    < > = values in this interval not displayed.       Infectious Disease:  Temp  Av.8 °C (98.3 °F)  Min: 36.6 °C (97.9 °F)  Max: 37.2 °C (98.9 °F)  Micro: reviewed  Recent Labs      18   0954   WBC  15.7*  16.2*   --   25.2*   --    NEUTSPOLYS  87.60*  73.50*   --   76.80*   --    LYMPHOCYTES  9.70*  12.70*   --   10.70*   --    MONOCYTES  2.70  12.30   --   9.80   --    EOSINOPHILS  0.00  0.00   --   0.00   --    BASOPHILS  0.00  0.20   --   0.00   --    ASTSGOT   --    --   2520*   --   1013*   ALTSGPT   --    --   2837*   --   1671*   ALKPHOSPHAT   --    --   222*   --   194*   TBILIRUBIN   --    --   2.5*   --   3.2*     Current Facility-Administered Medications   Medication Dose Frequency Provider Last Rate Last Dose   • riFAXIMin (XIFAXAN) tablet 550 mg  550 mg BID Richie Agrawal M.D.   550 mg at 18   • metoclopramide (REGLAN) injection 10 mg  10 mg Q6HRS Richie Agrawal M.D.   10 mg at 18 2328   • albumin human 25% solution 12.5 g  12.5 g DIALYSIS PRN Fiona Vasquez M.D. 150 mL/hr at 18 1233 12.5 g at 18 1233   • norepinephrine (LEVOPHED) 8 mg in D5W 250 mL Infusion  0-30 mcg/min Continuous Olivia Jimenes M.D.   Stopped at 18 1405   • epoprostenol (FLOLAN) 1.5 mg in glycine diluent for flolan 50 mL for Inhalation  0.01-0.05 mcg/kg/min Continuous Olivia Jimenes M.D. 12 mL/hr at 18 0025 0.024 mcg/kg/min at 18  0025   • piperacillin-tazobactam (ZOSYN) IVPB premix 4.5 g  4.5 g Q12HRS Narinder Encarnacion M.D.   4.5 g at 02/24/18 2041   • phenylephrine (NAY-SYNEPHRINE) 80,000 mcg in D5W 250 mL Infusion  0-300 mcg/min Continuous Olivia Jimenes M.D. 20.6 mL/hr at 02/25/18 0347 110 mcg/min at 02/25/18 0347   • famotidine (PEPCID) tablet 20 mg  20 mg Q EVENING Olivia Jimenes M.D.   20 mg at 02/24/18 2039   • heparin injection 1,750 Units  1,750 Units DIALYSIS PRN Fiona Vasquez M.D.   1,750 Units at 02/23/18 1138   • heparin injection 3,000 Units  3,000 Units DIALYSIS PRN Fiona Vasquez M.D.   3,000 Units at 02/24/18 1415   • vasopressin (VASOSTRICT) 20 Units in  mL Infusion  0.03 Units/min Continuous Narinder Encarnacion M.D. 9 mL/hr at 02/24/18 2334 0.03 Units/min at 02/24/18 2334   • dexmedetomidine (PRECEDEX) 400 mcg in D5W 100 mL infusion  0.1-1.5 mcg/kg/hr Continuous Olivia Jimenes M.D. 43.8 mL/hr at 02/25/18 0350 0.7 mcg/kg/hr at 02/25/18 0350   • heparin injection 5,000 Units  5,000 Units PRN Richie Agrawal M.D.   Stopped at 02/21/18 1210    And   • heparin infusion 25,000 units in 500 ml 0.45% nacl   Continuous Richie Agrawal M.D. 11 mL/hr at 02/24/18 0435 550 Units/hr at 02/24/18 0435   • fentaNYL (SUBLIMAZE) injection 25 mcg  25 mcg Q HOUR PRN Reece Pratt D.O.        Or   • fentaNYL (SUBLIMAZE) injection 50 mcg  50 mcg Q HOUR PRN Reece Pratt D.O.        Or   • fentaNYL (SUBLIMAZE) injection 100 mcg  100 mcg Q HOUR PRN Reece Pratt D.O.       • ipratropium-albuterol (DUONEB) nebulizer solution 3 mL  3 mL Q2HRS PRN (RT) Reece Pratt D.O.       • ipratropium-albuterol (DUONEB) nebulizer solution 3 mL  3 mL Q4HRS (RT) NAIMA RamirezOSunil   3 mL at 02/24/18 2307   • insulin regular (HUMULIN R) injection 3-14 Units  3-14 Units Q6HRS NAIMA RamirezO.   Stopped at 02/19/18 1200    And   • glucose 4 g chewable tablet 16 g  16 g Q15 MIN PRN Reece Pratt D.O.        And   •  dextrose 50% (D50W) injection 25 mL  25 mL Q15 MIN PRN NAIMA RamirezOSunil   25 mL at 02/23/18 0224   • senna-docusate (PERICOLACE or SENOKOT S) 8.6-50 MG per tablet 2 Tab  2 Tab BID Richie Agrawal M.D.   2 Tab at 02/24/18 2039    And   • polyethylene glycol/lytes (MIRALAX) PACKET 1 Packet  1 Packet QDAY PRN Richie Agrawal M.D.        And   • magnesium hydroxide (MILK OF MAGNESIA) suspension 30 mL  30 mL QDAY PRN Richie Agrawal M.D.   30 mL at 02/23/18 0836    And   • bisacodyl (DULCOLAX) suppository 10 mg  10 mg QDAY PRN Richie Agrawal M.D.       • acetaminophen (TYLENOL) tablet 650 mg  650 mg Q6HRS PRN Richie Agrawal M.D.       • NS (BOLUS) infusion 500 mL  500 mL Once PRN Richie Agrawal M.D.       • ondansetron (ZOFRAN) syringe/vial injection 4 mg  4 mg Q4HRS PRN Richie Agrawal M.D.       • ondansetron (ZOFRAN ODT) dispertab 4 mg  4 mg Q4HRS PRN Richie Agrawal M.D.       • promethazine (PHENERGAN) tablet 12.5-25 mg  12.5-25 mg Q4HRS PRN Richie Agrawal M.D.       • promethazine (PHENERGAN) suppository 12.5-25 mg  12.5-25 mg Q4HRS PRN Richie Agrawal M.D.       • prochlorperazine (COMPAZINE) injection 5-10 mg  5-10 mg Q4HRS PRN Richie Agrawal M.D.       • chlorhexidine (PERIDEX) 0.12 % solution 15 mL  15 mL BID NAIMA RamirezOSunil   15 mL at 02/24/18 2039   • lidocaine (XYLOCAINE) 1%  injection  1-2 mL Q30 MIN PRN Reece Pratt D.O.       • Respiratory Care per Protocol   Continuous RT Richie Agrawal M.D.         Last reviewed on 2/19/2018  3:59 PM by Ryan Correa R.N.    Quality  Measures:  EKG reviewed, Medications reviewed, Labs reviewed and Radiology images reviewed  Crooks catheter: No Crooks  Central line in place: Need for access, Vasopressors and Concentrated IV drugs    DVT Prophylaxis: Heparin  DVT prophylaxis - mechanical: SCDs  Ulcer prophylaxis: Yes  Antibiotics: Treating active infection/contamination beyond 24 hours  perioperative coverage        Problems/Plan:    Acute Hypoxic and Hypercapnic Respiratory Failure   - intubated in ER on 2/18   - cont full vent support   - cont RT/O2 protocols   - cont APRV setting   - cont Flolan   - follow ABGs/CXR and adjust ventilator   - cont vent bundle protocols   - no SBTs  Acute Pulmonary Edema   - likely due to cardiogenic shock and biventricular failure   - cont supportive vent measures   - HD daily for volume  Acute Kidney Injury   - renal consulted   - HD catheter placed 2/21   - HD daily per nephrology  Acute Transaminitis   - worsening   - likely due to RV failure and hepatic congestion  Hyperammonemia   - due to elevated transaminases   - would trial lactulose 4 times daily, but pt not tolerating PO   - HD will help  Cardiogenic Shock   - ECHO revealed bivent failure   - cont vasopressors and titrate for MAP > 65   - cardiology following   - not a candidate for RVAD/LVAD due to severe obesity   - unable to do cardiac cath due to severe obesity  Acute Atrial Fibrillation with RVR   - heparin drip   - amio bolus and drip continued  Acute Biventricular Heart Failure   - cont vasopressors for MAP goals >65   - cardiology consulted   - noted RV dilation and concern for PE, but due to patient's weight and JAYSON unable to do CTA to rule out PE   - not a candidate for LVAD/RVAD due to severe obesity  Acute Thrombocytopenia   - HIT ab negative   - concern for DIC  Anasarca   - due to bivent heart failure   - cont HD/SCUF  Acute leukocytosis   - worsening despite treatment   - BCs with staph epi-->s/p one dose vanco, repeat BC with GNR   - follow cultures   - cont unasyn/azithro-->changed to zosyn  Hyperkalemia   - follow  Severe Morbid Obesity  Recent Influenza A infection  Prophylaxis   - SCDs, heparin, pepcid  Prognosis   - Palliative following   - continues to remain poor due to biventricular failure, hepatic failure, respiratory failure, and renal failure requiring multiple vasopressors  and HD.  Continue Flolan for severe RV dilation and severe pulm HTN    Discussed patient condition and risk of morbidity and/or mortality with RN, RT, Therapies, Pharmacy, Dietary, , Charge nurse / hot rounds and cardiology and hospitalist.    The patient remains critically ill.  Critical care time = 49 minutes in directly providing and coordinating critical care and extensive data review.  No time overlap and excludes procedures.    Addendum:  Family arrived from out of town.  Dr. Hollie Gallego spoke to them and discussed her poor prognosis and no chance of survival.  The family chose to withdraw care and transition to comfort care.

## 2018-02-26 NOTE — RESPIRATORY CARE
Extubation    Cuff leak noted yes  Stridor present no    Patient toleration well    Events/Summary/Plan: pt extubated for comfort care. (02/25/18 5823)

## 2018-02-26 NOTE — PROGRESS NOTES
Dialysis ordered by Dr Vasquez for 3hrs on 1K 3Ca 40 bicarb bath. Treatment initiated at 1713, and ended early at 1729, d/t family decision to make pt comfort care. +190ml.

## 2018-02-26 NOTE — PROGRESS NOTES
Family conference held with patient's , brother, son and daughter in presence of patient's RN.  I reviewed with them the patient's medical condition, discuss her guarded prognosis with multiple system organ failure, reviewed treatment options including consideration of comfort care.  They were all in agreement that the patient would not want to prolong her life and this condition and agreed to transition to comfort care measures which were explained to them.

## 2018-02-27 NOTE — DISCHARGE SUMMARY
DATE OF ADMISSION:  2018    DATE OF DEATH:  2018    TIME OF DEATH:  .    CAUSE OF DEATH:  Acute respiratory failure secondary to pneumonia secondary to   recent influenza.    OTHER COMORBIDITIES:  1.  Severe pulmonary hypertension with anasarca.  2.  Severe sepsis.  3.  Acute renal failure.  4.  Acute metabolic encephalopathy.  5.  Acute liver failure.  6.  Morbid obesity.  7.  Chronic atrial fibrillation.  8.  Cardiomyopathy.  9.  Thrombocytopenia.    PRESENTATION:  Please refer to the dictated H and P.    HOSPITAL COURSE:  The patient is a 47-year-old female, morbidly obese,   recently evaluated at an outside hospital and diagnosed with influenza,   pneumonia, and prescribed treatment which she did not fill.  She also has a   history of atrial fibrillation and pulmonary hypertension and has not been   compliant with her medications.  She presented to local emergency room for   evaluation of dyspnea, she was hypoxic and altered.  She was transferred to   our facility.  She was initially on BiPAP and then had to be intubated.  The   patient was started on antibiotics for pneumonia.  She was in the intensive   care unit.  She had progressive renal failure, liver failure.  She was noted   to have severe pulmonary hypertension and cardiomyopathy with acute CHF on her   echocardiogram.  She was evaluated by cardiology, nephrology, and critical   care.  The patient's medical condition was progressively getting worse with   increased support from ventilation and increased pressor support.  After   discussion with her family, given her guarded prognosis, they elected to   transition her to comfort care measures.  The patient was extubated and   started on comfort care measures and  shortly after.       ____________________________________     MD BISHOP PARKER / STACEY    DD:  2018 07:18:21  DT:  2018 16:39:24    D#:  [id number]  Job#:  [id number]

## 2018-02-27 NOTE — PROGRESS NOTES
Pt given 10mg morphine and 4 mg ativan prior to extubation after discussion with patients family to ensure pt's comfort.  Pt extubated at 1850 and IV medication turned off.  After morphine and ativan pt appeared to be comfortable and did not show any signs of distress.    Linda Becerra

## 2018-02-28 LAB
BACTERIA BLD CULT: ABNORMAL
BACTERIA BLD CULT: ABNORMAL
SIGNIFICANT IND 70042: ABNORMAL
SITE SITE: ABNORMAL
SOURCE SOURCE: ABNORMAL

## 2023-03-23 NOTE — PROGRESS NOTES
ALLERGY SOLUTION RE-ORDER REQUEST    Kaleigh Tyson 1964 MRN: [id number]    DATE NEEDED:  5 weeks  Vial Color Content    Vial Size  Red 1:1 Wasp    13 mL  Red 1:1 Mixed Vespid   13 mL        Serum reorder consent signed and patient/parent was advised that new serums would be ordered through the pharmacy and billed to their insurance company when they arrive in clinic. Yes    Shot Clinic Location:  [facility].  Ship to Location: [facility].  Serum billed to:  [facility].    Special Instructions:  NA        Requester Signature  Madelyn WALTERS MA       [facility] Hospitalist Progress Note    Date of Service: 2018    Chief Complaint  47 y.o. female admitted 2018 with Afib anasarca and acute respiratory failure, hx of non compliance with meds    Interval Problem Update  Poor urine output weaned off norepinephrine remains on vasopressin and Drake-Synephrine and bicarbonate amiodarone drips    Consultants/Specialty  Dr Jimenes   cardiology    Disposition  ICU        Review of Systems   Unable to perform ROS: Intubated      Physical Exam  Laboratory/Imaging   Hemodynamics  Temp (24hrs), Av.9 °C (96.6 °F), Min:35.6 °C (96 °F), Max:36.8 °C (98.2 °F)   Temperature: 35.9 °C (96.7 °F)  Pulse  Av.4  Min: 32  Max: 135 Heart Rate (Monitored): (!) 106  Arterial BP: (!) 98/63 CVP (mm Hg): (!) 50 MM HG    Respiratory  Schreiber Vent Mode: APRV, Rate (breaths/min): 20, PEEP/CPAP: 12, PEEP/CPAP: 12, FiO2: 100, P Peak (PIP): 31, P MEAN: 27   Respiration: 16, Pulse Oximetry: 90 %     Work Of Breathing / Effort: Vented  RUL Breath Sounds: Crackles, RML Breath Sounds: Crackles, RLL Breath Sounds: Diminished, TEJAS Breath Sounds: Crackles, LLL Breath Sounds: Diminished    Fluids    Intake/Output Summary (Last 24 hours) at 18 1403  Last data filed at 18 0600   Gross per 24 hour   Intake          2983.59 ml   Output               25 ml   Net          2958.59 ml       Nutrition  Orders Placed This Encounter   Procedures   • Diet NPO     Standing Status:   Standing     Number of Occurrences:   1     Order Specific Question:   Type:     Answer:   Now [1]     Order Specific Question:   Restrict to:     Answer:   Strict [1]     Physical Exam   Constitutional: She appears well-developed.   HENT:   Head: Normocephalic and atraumatic.   Mouth/Throat: No oropharyngeal exudate.   Eyes: Conjunctivae are normal. Right eye exhibits no discharge. Left eye exhibits no discharge. No scleral icterus.   Neck: Neck supple. No JVD present. No tracheal deviation present.    Cardiovascular: An irregularly irregular rhythm present. Tachycardia present.  Exam reveals no gallop and no friction rub.    No murmur heard.  Pulmonary/Chest: No stridor. No respiratory distress. She has no wheezes. She has rhonchi. She has rales. She exhibits no tenderness.   Abdominal: Soft. Bowel sounds are normal. She exhibits no distension. There is no tenderness. There is no rebound and no guarding.   Musculoskeletal: She exhibits edema (3+ generalized ). She exhibits no tenderness.   Neurological: No cranial nerve deficit. She exhibits normal muscle tone.   Sedated no focal deficits noted   Skin: Skin is warm and dry. She is not diaphoretic. No cyanosis or erythema. Nails show no clubbing.   Psychiatric:   sedated   Nursing note and vitals reviewed.      Recent Labs      02/19/18   0855  02/20/18   0411  02/21/18 0317   WBC  7.8  13.2*  30.6*   RBC  4.40  4.91  4.64   HEMOGLOBIN  12.3  13.5  13.1   HEMATOCRIT  40.9  48.2*  45.1   MCV  93.0  98.2*  97.2   MCH  28.0  27.5  28.2   MCHC  30.1*  28.0*  29.0*   RDW  50.4*  54.5*  54.4*   PLATELETCT  264  286  223   MPV  9.8  10.5  11.1     Recent Labs      02/19/18   1525  02/20/18   0411  02/21/18   0317   SODIUM  138  135  137   POTASSIUM  4.5  5.3  5.1   CHLORIDE  106  102  102   CO2  22  29  14*   GLUCOSE  126*  106*  150*   BUN  22  27*  36*   CREATININE  1.30  1.86*  2.64*   CALCIUM  8.7  8.7  7.7*     Recent Labs      02/19/18   0855  02/20/18   0411  02/20/18   1015  02/20/18   1645  02/21/18   0317   APTT  25.2   --   34.8  52.7*   --    INR  1.44*  1.64*   --    --   3.07*     Recent Labs      02/19/18   0855  02/21/18 0317   BNPBTYPENAT  176*  370*     Recent Labs      02/19/18   1525  02/21/18   0317   TRIGLYCERIDE  106  103          Assessment/Plan     Acute kidney injury (CMS-HCC)   Assessment & Plan    Likely ATN secondary to sepsis and shock  Will try Lasix drip may need renal replacement therapy for fluid management  Nephrology consulted         Metabolic acidosis   Assessment & Plan    Continue bicarb drip        Sepsis ([facility] [place] [facility])- (present on admission)   Assessment & Plan    This is severe sepsis with the following associated acute organ dysfunction(s): acute kidney failure.metabolic encephalopathy    Continue current antibiotics and pressors        Chronic atrial fibrillation (CMS-HCC)   Assessment & Plan    Continue Amiodarone drip  Anticoagulation with heparin        Cardiomyopathy (CMS-HCC)   Assessment & Plan    EF20%  Given shock no BB or ACEI  Discussed with  Dr sandoval  Diuresis as  tolerated it does not respond will need renal replacement therapy        Shock (CMS-HCC)   Assessment & Plan    Likely cardiogenic and possibly septic  Continue  vasopressin and norepi drips and stoney off as tolerated            Elevated LFTs   Assessment & Plan    US consistent with steatosis and also likely some component of hepatic congestion        Acute metabolic encephalopathy   Assessment & Plan    Wean off sedation as tolerated        Anasarca   Assessment & Plan    Did not respond to IV Lasix will start Lasix drip  She'll likely require hemodialysis discussed with Dr. Vasquez from nephrology        Cor pulmonale (CMS-HCC)   Assessment & Plan    Lasix drip  Heparin drip for Afib and also possible PE given RV strain on echo patient unable to tolerate CT of chest        PNA (pneumonia)   Assessment & Plan    Continue unasyn azithromycin  Cultures negative follow-up on final results        Morbid obesity (CMS-HCC)   Assessment & Plan    Body mass index is 91.72 kg/m².         Acute respiratory failure (CMS-HCC)   Assessment & Plan    Vent management per critical care discussed with Dr Jimenes            Quality-Core Measures   Reviewed items::  Labs reviewed, Medications reviewed and Radiology images reviewed  Crooks catheter::  Critically Ill - Requiring Accurate Measurement of Urinary Output  Central line in place:  Vasopressors  DVT prophylaxis  pharmacological::  Heparin  Antibiotics:  Treating active infection/contamination beyond 24 hours perioperative coverage      Overall prognosis guarded will await family decision regarding goals of care remains full code at this time per my last discussion with  and daughter, palliative care is following

## 2024-07-25 NOTE — PROGRESS NOTES
Pulmonary Critical Care Progress Note        Date of Service:  2/21/2018  Chief Complaint: Worsening cough and dyspnea    History of Present Illness: 47 y.o. female admitted for acute hypoxic respiratory failure likely due to biventricular failure and cardiogenic shock.     ROS: Unable to obtain as the patient is on the ventilator and sedated  Interval Events:  24 hour interval history reviewed    - no events overnight   - able to wean some vasopressors   - Levo off this morning   - vaso at 0.03, amio at 0.5, and adolfo at 30   - bicarb drip at 100   - precedex at 0.7   - moving all arms/legs and occasionally following   - a-fib at 90-100s   - SBP 80-90s   - CVP 40-50   - Tmax 36.9   - TFs at goal   - Anuric after 80mg of Lasix   - right IJ and left radial a-line   - vent day #3   - Changed to APRV this morning due to O2 sats at 85%   - CXR(reviewed): severe pulmonary edema, slightly better    Yesterday's Events:   - difficulty with chase last night and replaced   - not very responsive today with propofol off for 30 min   - adolfo at 300   - vaso at 0.04   - levo at 10   - lasic drip off due to hypotension this morning   - poor urinary output   - hypotensive this morning after turning her with SBPs to 70s   - started levo-->a-fib with RVR to 160-170s   - Amio bolus and drip started   - esmolol off now   - a-fib with -170s   - SBP 70-90s   - vent day #2   - PEEP still at 12, PIP at low 20s   - CXR(reviewed): pulm edema and hypoinflated lungs   - day #2 of unasyn/azithro   - worsening renal function    PFSH:  No change.    Respiratory:  Schreiber Vent Mode: APVCMV, Rate (breaths/min): 20, Vt Target (mL): 330, PEEP/CPAP: 12, FiO2: 100, Static Compliance (ml / cm H2O): 59.1  Pulse Oximetry: (!) 58 %    Exam: hypoxia noted on ABG-->changed to APRV, breathing comfortably, coarse throughout, diminished at the bases  ImagingAvailable data reviewed   Recent Labs      02/20/18   1212  02/20/18   1652  02/20/18 1952  Put in orders for ua and culture for botox appointments on 9/11/24 and 1/24/25     ISTATAPH  7.087*  7.194*  7.208*   ISTATAPCO2  47.6*  41.7*  34.8   ISTATAPO2  69  80  79   ISTATATCO2  16*  17*  15*   TZHGBUC2HJG  85*  93  93   ISTATARTHCO3  14.4*  16.1*  13.9*   ISTATARTBE  -15*  -12*  -13*   ISTATTEMP  96.5 F  96.2 F  96.0 F   ISTATFIO2  80  100  100   ISTATSPEC  Arterial  Arterial  Arterial   ISTATAPHTC  7.102*  7.212*  7.227*   [id number]  64  73  72       HemoDynamics:  Pulse: (!) 108, Heart Rate (Monitored): (!) 120  Arterial BP: 103/72  CVP (mm Hg): (!) 55 MM HG    Exam:irregularly irregular, no murmur, 4+ pitting pedal edema, 2+ dpp=, delayed cap refill by 4-5 seconds, dusky toes  Imaging: Available data reviewed   ECHO 2/19:  No prior study is available for comparison.   Poor visualization of all cardiac structures due to body habitus   despite contrast use.  Severely reduced left ventricular systolic function by gross   evaluation.  Flattened septum in systole and diastole consistent with RV pressure   and volume overload.  Severely dilated right ventricle.  Reduced right ventricular systolic function.  Valves not seen.  Recent Labs      02/19/18   0855  02/20/18   0411  02/21/18   0317   TROPONINI  <0.01  0.03  2.47*   BNPBTYPENAT  176*   --    --        Neuro:  GCS Total Stella Coma Score: 6       Exam: lightly sedated, minimally responsive to painful stimulation, moving extremities occasionally, grimaces with symmetrical facial expressions (no changes)  Imaging: Available data reviewed    Fluids:  Intake/Output       02/19/18 0700 - 02/20/18 0659 02/20/18 0700 - 02/21/18 0659 02/21/18 0700 - 02/22/18 0659      0552-3443 1990-0865 Total 0440-1641 2766-7439 Total 5790-4820 5991-5438 Total       Intake    I.V.  494  2037.6 2531.6  2453  1098.8 3551.8  --  -- --    Precedex Volume -- -- -- 56 259.3 315.3 -- -- --    Esmolol Volume 193 554.9 747.9 48 -- 48 -- -- --    Amiodarone Volume -- -- -- 396 170 566 -- -- --    Vasopressin Volume -- -- -- 228 90 318 -- -- --    Phenylephrine  Volume 141  363.1 1453.1 -- -- --    Propofol Volume -- 704.8 704.8 45 -- 45 -- -- --    Norepinephrine Volume -- -- -- 552 216.4 768.4 -- -- --    Bumex 60 60 120 -- -- -- -- -- --    IVPB 100 300 400 -- -- -- -- -- --    IV Volume (Lasix) -- 30 30 38 -- 38 -- -- --    Enteral  --  -- --  190  480 670  --  -- --    Enteral Volume -- -- -- 190 450 640 -- -- --    Free Water / Tube Flush -- -- -- -- 30 30 -- -- --    Total Intake 494 2037.6 2531.6 2643 1578.8 4221.8 -- -- --       Output    Urine  1175  50 1225  30  25 55  --  -- --    Indwelling Cathether 1175 50 1225 30 25 55 -- -- --    Void (ml) -- 0 0 -- -- -- -- -- --    Drains  --  -- --  0  -- 0  --  -- --    Residual Amount (ml) (Discarded) -- -- -- 0 -- 0 -- -- --    Stool  --  -- --  --  -- --  --  -- --    Number of Times Stooled 1 x 0 x 1 x -- -- -- -- -- --    Total Output 1175 50 1225 30 25 55 -- -- --       Net I/O     -681 1987.6 1306.6 2613 1553.8 4166.8 -- -- --        Weight:  (bed not accurate to obtain a weight )  Recent Labs      02/19/18   1525  02/20/18   0411  02/21/18   0317   SODIUM  138  135  137   POTASSIUM  4.5  5.3  5.1   CHLORIDE  106  102  102   CO2  22  29  14*   BUN  22  27*  36*   CREATININE  1.30  1.86*  2.64*   MAGNESIUM   --   2.1  1.9   PHOSPHORUS   --   7.8*  8.2*   CALCIUM  8.7  8.7  7.7*       GI/Nutrition:  Exam: obese, bowel sounds noted, ND/ND, no masses  Imaging: Available data reviewed  Cortrak jony  Liver Function  Recent Labs      02/19/18   0855  02/19/18   1525  02/20/18   0411  02/21/18   0317   ALTSGPT  404*   --    --    --    ASTSGOT  290*   --    --    --    ALKPHOSPHAT  109*   --    --    --    TBILIRUBIN  1.8*   --    --    --    GLUCOSE  99  126*  106*  150*       Heme:  Recent Labs      02/19/18   0855  02/20/18   0411  02/20/18   1015  02/20/18   1645  02/21/18 0317   RBC  4.40  4.91   --    --   4.64   HEMOGLOBIN  12.3  13.5   --    --   13.1   HEMATOCRIT  40.9  48.2*   --    --   45.1    PLATELETCT  264  286   --    --   223   PROTHROMBTM  17.2*  19.1*   --    --   31.4*   APTT  25.2   --   34.8  52.7*   --    INR  1.44*  1.64*   --    --   3.07*       Infectious Disease:  Temp  Av.1 °C (97 °F)  Min: 35.6 °C (96 °F)  Max: 36.9 °C (98.4 °F)  Micro: reviewed  Recent Labs      1855  18   WBC  7.8  13.2*  30.6*   NEUTSPOLYS  89.60*  84.90*   --    LYMPHOCYTES  5.70*  5.10*   --    MONOCYTES  4.10  9.30   --    EOSINOPHILS  0.10  0.00   --    BASOPHILS  0.10  0.20   --    ASTSGOT  290*   --    --    ALTSGPT  404*   --    --    ALKPHOSPHAT  109*   --    --    TBILIRUBIN  1.8*   --    --      Current Facility-Administered Medications   Medication Dose Frequency Provider Last Rate Last Dose   • vasopressin (VASOSTRICT) 20 Units in  mL Infusion  0.03 Units/min Continuous Narinder Encarnacion M.D. 9 mL/hr at 18 0.03 Units/min at 18   • amiodarone (CORDARONE) 450 mg in D5W 250 mL Infusion  0.5 mg/min Continuous Olivia Jimenes M.D. 17 mL/hr at 18 0.5 mg/min at 18   • dexmedetomidine (PRECEDEX) 400 mcg in D5W 100 mL infusion  0.1-1.5 mcg/kg/hr Continuous Olivia Jimenes M.D. 62.5 mL/hr at 189 1 mcg/kg/hr at 18   • heparin injection 5,000 Units  5,000 Units PRN Richie Agrawal M.D.        And   • heparin infusion 25,000 units in 500 ml 0.45% nacl   Continuous Richie Agrawal M.D.   Stopped at 18   • sodium bicarbonate 8.4 % 150 mEq in D5W 1,000 mL infusion  150 mEq Continuous Richie Agrawal M.D. 100 mL/hr at 18 0047 150 mEq at 18 0047   • phenylephrine (NAY-SYNEPHRINE) 80,000 mcg in  mL Infusion  0-300 mcg/min Continuous Olivia Jimenes M.D. 6.6 mL/hr at 189 35 mcg/min at 18   • norepinephrine (LEVOPHED) 16 mg in  mL Infusion  0-30 mcg/min Continuous Olivia Jimenes M.D.   Stopped at 18 0333   • MD  ALERT...Adult ICU Electrolyte Replacement per Pharmacy Protocol   pharmacy to dose Reece Pratt D.O.       • fentaNYL (SUBLIMAZE) injection 25 mcg  25 mcg Q HOUR PRN Reece Pratt D.O.        Or   • fentaNYL (SUBLIMAZE) injection 50 mcg  50 mcg Q HOUR PRN NAIMA RamirezOSunil        Or   • fentaNYL (SUBLIMAZE) injection 100 mcg  100 mcg Q HOUR PRN TRUDI Ramirez.O.       • ipratropium-albuterol (DUONEB) nebulizer solution 3 mL  3 mL Q2HRS PRN (RT) Reece Pratt D.O.       • ipratropium-albuterol (DUONEB) nebulizer solution 3 mL  3 mL Q4HRS (RT) Reece Pratt D.O.   3 mL at 02/21/18 0219   • famotidine (PEPCID) tablet 20 mg  20 mg Q12HRS NAIMA RamirezO.        Or   • famotidine (PEPCID) injection 20 mg  20 mg Q12HRS TRUDI Ramirez.O.   20 mg at 02/20/18 2050   • insulin regular (HUMULIN R) injection 3-14 Units  3-14 Units Q6HRS TRUDI Ramirez.O.   Stopped at 02/19/18 1200    And   • glucose 4 g chewable tablet 16 g  16 g Q15 MIN PRN NAIMA RamirezOSunil        And   • dextrose 50% (D50W) injection 25 mL  25 mL Q15 MIN PRN TRUDI Ramirez.O.       • senna-docusate (PERICOLACE or SENOKOT S) 8.6-50 MG per tablet 2 Tab  2 Tab BID Richie Agrawal M.D.   2 Tab at 02/20/18 2050    And   • polyethylene glycol/lytes (MIRALAX) PACKET 1 Packet  1 Packet QDAY PRN Richie Agrawal M.D.        And   • magnesium hydroxide (MILK OF MAGNESIA) suspension 30 mL  30 mL QDAY PRN Richie Agrawal M.D.        And   • bisacodyl (DULCOLAX) suppository 10 mg  10 mg QDAY PRN Richie Agrawal M.D.       • acetaminophen (TYLENOL) tablet 650 mg  650 mg Q6HRS PRN Richie Agrawal M.D.       • NS (BOLUS) infusion 500 mL  500 mL Once PRN Richie Agrawal M.D.       • ampicillin/sulbactam (UNASYN) 3 g in  mL IVPB  3 g Q6HRS Richie Agrawal M.D. 200 mL/hr at 02/21/18 0420 3 g at 02/21/18 0420   • azithromycin (ZITHROMAX) tablet 250 mg  250 mg Q24HRS Richie Agrawal M.D.   250  mg at 02/20/18 1003   • ondansetron (ZOFRAN) syringe/vial injection 4 mg  4 mg Q4HRS PRN Richie Agrawal M.D.       • ondansetron (ZOFRAN ODT) dispertab 4 mg  4 mg Q4HRS PRN Richie Agrawal M.D.       • promethazine (PHENERGAN) tablet 12.5-25 mg  12.5-25 mg Q4HRS PRN Richie Agrawal M.D.       • promethazine (PHENERGAN) suppository 12.5-25 mg  12.5-25 mg Q4HRS PRN Richie Agrawal M.D.       • prochlorperazine (COMPAZINE) injection 5-10 mg  5-10 mg Q4HRS PRN Richie Agrawal M.D.       • propofol (DIPRIVAN) injection  0-80 mcg/kg/min Continuous Reece Pratt D.O.   Stopped at 02/20/18 0825   • chlorhexidine (PERIDEX) 0.12 % solution 15 mL  15 mL BID Reece Pratt D.O.   15 mL at 02/20/18 2050   • lidocaine (XYLOCAINE) 1%  injection  1-2 mL Q30 MIN PRN Reece Pratt D.O.       • tobramycin (TOBREX) 0.3 % ophthalmic solution 1 Drop  1 Drop Q4HRS Reece Pratt D.O.   1 Drop at 02/21/18 0420   • Respiratory Care per Protocol   Continuous RT Richie Agrawal M.D.         Last reviewed on 2/19/2018  3:59 PM by Ryan Correa R.N.    Quality  Measures:  EKG reviewed, Medications reviewed, Labs reviewed and Radiology images reviewed  Crooks catheter: No Crooks  Central line in place: Need for access, Vasopressors and Concentrated IV drugs    DVT Prophylaxis: Heparin  DVT prophylaxis - mechanical: SCDs  Ulcer prophylaxis: Yes  Antibiotics: Treating active infection/contamination beyond 24 hours perioperative coverage        Problems/Plan:    Acute Hypoxic and Hypercapnic Respiratory Failure   - intubated in ER on 2/18   - cont full vent support   - cont RT/O2 protocols   - changed to APRV due to continued hypoxia    **may need Flolan due to pulmonary HTN and RV dysfunction   - follow ABGs/CXR and adjust ventilator   - cont vent bundle protocols  Acute Pulmonary Edema   - likely due to cardiogenic shock and biventricular failure   - cont supportive vent measures   - trial lasix  again  Acute Kidney Injury   - renal consulted   - will need HD catheter   - will eventually need HD for volume  Cardiogenic Shock   - ECHO revealed bivent failure   - cont vasopressors and titrate for MAP > 65   - cardiology following  Acute Atrial Fibrillation with RVR   - heparin drip   - amio bolus and drip continued  Acute Biventricular Heart Failure   - cont vasopressors for MAP goals >65   - cardiology consulted   - noted RV dilation and concern for PE, but due to patient's weight and JAYSON unable to do CTA to rule out PE  Anasarca   - due to bivent heart failure   - may need HD/SCUF  Acute leukocytosis   - started steroids for possible COPD exacerbation   - stop steroids   - follow   - BCs with staph-->one dose vanco given   - follow cultures   - cont unasyn/azithro  Hyperkalemia   - follow  Severe Morbid Obesity  Recent Influenza A infection  Prophylaxis   - SCDs, heparin, pepcid  Prognosis   - critical to poor based on current therapies of maximum doses of vasopressors and uncontrolled hypotension and a-fib.  We have reached the patient's  to discuss the severity of her case as well as her limited chances at survival. Will continue full treatment for now and await their arrival. Palliative following now as well due to the severity of the patient's case as well the multiorgan failure    Discussed patient condition and risk of morbidity and/or mortality with RN, RT, Therapies, Pharmacy, Dietary, , Charge nurse / hot rounds and cardiology and hospitalist.    The patient remains critically ill.  Critical care time = 37 minutes in directly providing and coordinating critical care and extensive data review.  No time overlap and excludes procedures.